# Patient Record
Sex: FEMALE | Race: WHITE | Employment: OTHER | URBAN - METROPOLITAN AREA
[De-identification: names, ages, dates, MRNs, and addresses within clinical notes are randomized per-mention and may not be internally consistent; named-entity substitution may affect disease eponyms.]

---

## 2017-01-16 ENCOUNTER — HOSPITAL ENCOUNTER (OUTPATIENT)
Dept: MAMMOGRAPHY | Age: 72
Discharge: HOME | End: 2017-01-16
Attending: SURGERY | Admitting: SURGERY

## 2017-01-16 ENCOUNTER — OFFICE VISIT (OUTPATIENT)
Dept: SURGERY | Age: 72
End: 2017-01-16

## 2017-01-16 VITALS
HEART RATE: 66 BPM | DIASTOLIC BLOOD PRESSURE: 83 MMHG | HEIGHT: 65 IN | SYSTOLIC BLOOD PRESSURE: 137 MMHG | TEMPERATURE: 97.1 F | WEIGHT: 169.2 LBS | BODY MASS INDEX: 28.19 KG/M2

## 2017-01-16 DIAGNOSIS — R92.8 ABNORMAL FINDING ON BREAST IMAGING: ICD-10-CM

## 2017-01-16 DIAGNOSIS — R92.8 ABNORMAL FINDING ON BREAST IMAGING: Primary | ICD-10-CM

## 2017-01-16 DIAGNOSIS — Z12.31 SCREENING MAMMOGRAM, ENCOUNTER FOR: ICD-10-CM

## 2017-01-16 PROCEDURE — 99213 OFFICE O/P EST LOW 20 MIN: CPT | Performed by: SURGERY

## 2017-01-16 PROCEDURE — 3014F SCREEN MAMMO DOC REV: CPT | Performed by: SURGERY

## 2017-01-16 PROCEDURE — 4004F PT TOBACCO SCREEN RCVD TLK: CPT | Performed by: SURGERY

## 2017-01-16 PROCEDURE — 1090F PRES/ABSN URINE INCON ASSESS: CPT | Performed by: SURGERY

## 2017-01-16 PROCEDURE — 3017F COLORECTAL CA SCREEN DOC REV: CPT | Performed by: SURGERY

## 2017-01-16 PROCEDURE — 1123F ACP DISCUSS/DSCN MKR DOCD: CPT | Performed by: SURGERY

## 2017-01-16 PROCEDURE — G8399 PT W/DXA RESULTS DOCUMENT: HCPCS | Performed by: SURGERY

## 2017-01-16 PROCEDURE — 4040F PNEUMOC VAC/ADMIN/RCVD: CPT | Performed by: SURGERY

## 2017-01-16 PROCEDURE — G8484 FLU IMMUNIZE NO ADMIN: HCPCS | Performed by: SURGERY

## 2017-01-16 PROCEDURE — G8420 CALC BMI NORM PARAMETERS: HCPCS | Performed by: SURGERY

## 2017-01-16 PROCEDURE — G8427 DOCREV CUR MEDS BY ELIG CLIN: HCPCS | Performed by: SURGERY

## 2017-01-16 ASSESSMENT — ENCOUNTER SYMPTOMS
BACK PAIN: 1
WHEEZING: 1
CONSTIPATION: 1
ABDOMINAL PAIN: 1
EYES NEGATIVE: 1
SHORTNESS OF BREATH: 1

## 2017-01-27 PROBLEM — F32.A DEPRESSION: Status: ACTIVE | Noted: 2017-01-27

## 2017-05-11 ENCOUNTER — HOSPITAL ENCOUNTER (OUTPATIENT)
Dept: ENDOSCOPY | Age: 72
Discharge: HOME | End: 2017-05-11
Attending: INTERNAL MEDICINE | Admitting: INTERNAL MEDICINE

## 2017-05-11 VITALS
HEART RATE: 57 BPM | OXYGEN SATURATION: 98 % | TEMPERATURE: 98.1 F | RESPIRATION RATE: 18 BRPM | SYSTOLIC BLOOD PRESSURE: 142 MMHG | DIASTOLIC BLOOD PRESSURE: 72 MMHG

## 2017-05-11 LAB
GLUCOSE BLD-MCNC: 86 MG/DL (ref 70–99)
PERFORMED ON: NORMAL

## 2017-05-11 RX ORDER — SODIUM CHLORIDE 0.9 % (FLUSH) 0.9 %
10 SYRINGE (ML) INJECTION EVERY 12 HOURS SCHEDULED
Status: DISCONTINUED | OUTPATIENT
Start: 2017-05-11 | End: 2017-05-12 | Stop reason: HOSPADM

## 2017-05-11 RX ORDER — SODIUM CHLORIDE 0.9 % (FLUSH) 0.9 %
10 SYRINGE (ML) INJECTION PRN
Status: DISCONTINUED | OUTPATIENT
Start: 2017-05-11 | End: 2017-05-12 | Stop reason: HOSPADM

## 2017-05-11 RX ORDER — SODIUM CHLORIDE 9 MG/ML
INJECTION, SOLUTION INTRAVENOUS CONTINUOUS
Status: DISCONTINUED | OUTPATIENT
Start: 2017-05-11 | End: 2017-05-12 | Stop reason: HOSPADM

## 2017-05-11 RX ORDER — LIDOCAINE HYDROCHLORIDE 10 MG/ML
1 INJECTION, SOLUTION EPIDURAL; INFILTRATION; INTRACAUDAL; PERINEURAL
Status: ACTIVE | OUTPATIENT
Start: 2017-05-11 | End: 2017-05-11

## 2017-05-11 ASSESSMENT — PAIN SCALES - GENERAL
PAINLEVEL_OUTOF10: 0

## 2017-05-11 ASSESSMENT — COPD QUESTIONNAIRES: CAT_SEVERITY: SEVERE

## 2017-09-22 PROBLEM — R87.810 PAP SMEAR OF CERVIX SHOWS HIGH RISK HPV PRESENT: Status: ACTIVE | Noted: 2017-09-22

## 2017-09-22 PROBLEM — A63.0 HPV (HUMAN PAPILLOMA VIRUS) ANOGENITAL INFECTION: Status: ACTIVE | Noted: 2017-09-22

## 2017-12-08 ENCOUNTER — OFFICE VISIT (OUTPATIENT)
Dept: PULMONOLOGY | Age: 72
End: 2017-12-08

## 2017-12-08 VITALS
SYSTOLIC BLOOD PRESSURE: 140 MMHG | BODY MASS INDEX: 28.99 KG/M2 | HEART RATE: 79 BPM | RESPIRATION RATE: 18 BRPM | WEIGHT: 174 LBS | OXYGEN SATURATION: 94 % | DIASTOLIC BLOOD PRESSURE: 79 MMHG | HEIGHT: 65 IN

## 2017-12-08 DIAGNOSIS — J43.2 CENTRILOBULAR EMPHYSEMA (HCC): ICD-10-CM

## 2017-12-08 DIAGNOSIS — R06.02 SOB (SHORTNESS OF BREATH): Primary | ICD-10-CM

## 2017-12-08 DIAGNOSIS — K44.9 HIATAL HERNIA: ICD-10-CM

## 2017-12-08 DIAGNOSIS — J44.9 COPD, SEVERITY TO BE DETERMINED (HCC): ICD-10-CM

## 2017-12-08 PROCEDURE — 99204 OFFICE O/P NEW MOD 45 MIN: CPT | Performed by: INTERNAL MEDICINE

## 2017-12-08 PROCEDURE — G8427 DOCREV CUR MEDS BY ELIG CLIN: HCPCS | Performed by: INTERNAL MEDICINE

## 2017-12-08 PROCEDURE — G8484 FLU IMMUNIZE NO ADMIN: HCPCS | Performed by: INTERNAL MEDICINE

## 2017-12-08 PROCEDURE — 1090F PRES/ABSN URINE INCON ASSESS: CPT | Performed by: INTERNAL MEDICINE

## 2017-12-08 PROCEDURE — 3014F SCREEN MAMMO DOC REV: CPT | Performed by: INTERNAL MEDICINE

## 2017-12-08 PROCEDURE — 3023F SPIROM DOC REV: CPT | Performed by: INTERNAL MEDICINE

## 2017-12-08 PROCEDURE — G8926 SPIRO NO PERF OR DOC: HCPCS | Performed by: INTERNAL MEDICINE

## 2017-12-08 PROCEDURE — 3017F COLORECTAL CA SCREEN DOC REV: CPT | Performed by: INTERNAL MEDICINE

## 2017-12-08 PROCEDURE — G8417 CALC BMI ABV UP PARAM F/U: HCPCS | Performed by: INTERNAL MEDICINE

## 2017-12-08 PROCEDURE — G8399 PT W/DXA RESULTS DOCUMENT: HCPCS | Performed by: INTERNAL MEDICINE

## 2017-12-08 PROCEDURE — 1123F ACP DISCUSS/DSCN MKR DOCD: CPT | Performed by: INTERNAL MEDICINE

## 2017-12-08 PROCEDURE — 4040F PNEUMOC VAC/ADMIN/RCVD: CPT | Performed by: INTERNAL MEDICINE

## 2017-12-08 PROCEDURE — 4004F PT TOBACCO SCREEN RCVD TLK: CPT | Performed by: INTERNAL MEDICINE

## 2017-12-08 RX ORDER — OMEPRAZOLE 40 MG/1
40 CAPSULE, DELAYED RELEASE ORAL DAILY
Qty: 30 CAPSULE | Refills: 3 | Status: SHIPPED | OUTPATIENT
Start: 2017-12-08 | End: 2018-03-15 | Stop reason: SDUPTHER

## 2017-12-08 NOTE — LETTER
Pulmonary, Critical Care & Sleep  [address] [facility], [address] [id number]  Phone: [phone]  Fax: [phone]        December 8, 2017       Patient: Jenae Lozano   MR Number: [id number]   YOB: 1945   Date of Visit: 12/8/2017       Dear Dr. Óscar Dewitt MD  :    Thank you for the request for consultation for Jenae Lozano to me for the evaluation of Shortness of breath and COPD. Below are the relevant portions of my assessment and plan of care. Assessment:       1. SOB (shortness of breath)     2. COPD, severity to be determined ([address]     3. Centrilobular emphysema ([address]     4. Hiatal hernia       Plan:    · I discussed with patient the above  · I reviewed her chart including her last CAT scan of the chest  · I explained findings of a large bulla on the right side and diffuse emphysema changes  · I think her hiatal hernia and GERD symptoms is contributing to her recurrent respiratory symptoms  · I encouraged her to stop smoking  · I will increase her Advair to 230 twice a day, start Spiriva daily and continue albuterol HFA when necessary  · Continue albuterol neb treatment twice a day and add a flutter valve for airway clearance  · Educated about hiatal hernia and GERD related respiratory symptoms and gave her instructions to follow, will start Prilosec 40 mg daily as well  · Order full PFT and a CT scan of the chest  · She might benefit from pulmonary rehab which we will order once PFT resulted  · Return to see in 1 month  ·   If you have questions, please do not hesitate to call me. I look forward to following Dodie Serrato along with you.     Sincerely,        Josi Palomares MD    CC providers:  Óscar Dewitt MD  [facility], [address]

## 2017-12-08 NOTE — PROGRESS NOTES
Mother      cervical    Stroke Sister     Heart Disease Brother     Breast Cancer Maternal Aunt      x2     Social History     Social History    Marital status:      Spouse name: N/A    Number of children: N/A    Years of education: N/A     Occupational History    Not on file. Social History Main Topics    Smoking status: Current Every Day Smoker     Packs/day: 0.50     Years: 57.00     Types: Cigarettes    Smokeless tobacco: Never Used      Comment: started to smoke at 16 / smoked up to 2 p.p.d / now smokes 10 to 15 cigarettes a day    Alcohol use No    Drug use: Yes      Comment: marijuana    Sexual activity: Not on file     Other Topics Concern    Not on file     Social History Narrative    No narrative on file         Review of Systems   Constitutional: Negative. Negative for fever, chills, diaphoresis, activity change, appetite change, fatigue and unexpected weight change. HENT: Negative. Negative for hearing loss, ear pain, nosebleeds, congestion, facial swelling, rhinorrhea, sneezing, neck pain, neck stiffness, postnasal drip, sinus pressure and ear discharge. Eyes: Negative. Negative for photophobia, pain, discharge, redness, itching and visual disturbance. Respiratory: As per HPI  Cardiovascular: Negative. Negative for chest pain, palpitations and leg swelling. Gastrointestinal: Negative. Negative for abdominal pain, blood in stool, abdominal distention and anal bleeding. Genitourinary: Negative. Negative for dysuria, urgency, frequency, hematuria, decreased urine volume, enuresis and difficulty urinating. Musculoskeletal: Negative. Negative for myalgias, back pain, joint swelling, arthralgias and gait problem. Skin: Negative. Negative for color change and pallor. Neurological: Negative. Negative for dizziness, tremors, seizures, syncope, facial asymmetry, speech difficulty, weakness, light-headedness, numbness and headaches. Hematological: Negative.

## 2017-12-12 ENCOUNTER — TELEPHONE (OUTPATIENT)
Dept: PULMONOLOGY | Age: 72
End: 2017-12-12

## 2017-12-19 ENCOUNTER — HOSPITAL ENCOUNTER (OUTPATIENT)
Dept: OTHER | Age: 72
Discharge: HOME | End: 2017-12-19
Attending: INTERNAL MEDICINE | Admitting: INTERNAL MEDICINE

## 2017-12-19 LAB
ALBUMIN SERPL-MCNC: 4.5 G/DL (ref 3.4–5)
ANION GAP SERPL CALCULATED.3IONS-SCNC: 15 MMOL/L (ref 3–16)
BUN BLDV-MCNC: 10 MG/DL (ref 7–20)
CALCIUM SERPL-MCNC: 9.1 MG/DL (ref 8.3–10.6)
CHLORIDE BLD-SCNC: 102 MMOL/L (ref 99–110)
CO2: 24 MMOL/L (ref 21–32)
CREAT SERPL-MCNC: 0.9 MG/DL (ref 0.6–1.2)
GFR AFRICAN AMERICAN: >60
GFR NON-AFRICAN AMERICAN: >60
GLUCOSE BLD-MCNC: 88 MG/DL (ref 70–99)
PHOSPHORUS: 2.6 MG/DL (ref 2.5–4.9)
POTASSIUM SERPL-SCNC: 4.5 MMOL/L (ref 3.5–5.1)
SODIUM BLD-SCNC: 141 MMOL/L (ref 136–145)

## 2017-12-21 ENCOUNTER — TELEPHONE (OUTPATIENT)
Dept: PULMONOLOGY | Age: 72
End: 2017-12-21

## 2017-12-21 NOTE — TELEPHONE ENCOUNTER
Patient went to schedule her ct and it was without contrast. Patient was under the impression the ct was supposed to have contrast,and that is why she was having bloodwork.  Please call Lucy back with scheduling and let her know if the ct should be with or without contrast.

## 2017-12-29 ENCOUNTER — OFFICE VISIT (OUTPATIENT)
Dept: PULMONOLOGY | Age: 72
End: 2017-12-29

## 2017-12-29 VITALS
DIASTOLIC BLOOD PRESSURE: 76 MMHG | HEART RATE: 75 BPM | HEIGHT: 65 IN | WEIGHT: 170 LBS | OXYGEN SATURATION: 87 % | BODY MASS INDEX: 28.32 KG/M2 | RESPIRATION RATE: 18 BRPM | SYSTOLIC BLOOD PRESSURE: 136 MMHG | TEMPERATURE: 97.7 F

## 2017-12-29 DIAGNOSIS — J20.9 ACUTE BRONCHITIS, UNSPECIFIED ORGANISM: ICD-10-CM

## 2017-12-29 DIAGNOSIS — J96.11 CHRONIC RESPIRATORY FAILURE WITH HYPOXIA (HCC): ICD-10-CM

## 2017-12-29 DIAGNOSIS — J44.1 ACUTE EXACERBATION OF CHRONIC OBSTRUCTIVE PULMONARY DISEASE (HCC): Primary | ICD-10-CM

## 2017-12-29 PROCEDURE — G8427 DOCREV CUR MEDS BY ELIG CLIN: HCPCS | Performed by: INTERNAL MEDICINE

## 2017-12-29 PROCEDURE — G8399 PT W/DXA RESULTS DOCUMENT: HCPCS | Performed by: INTERNAL MEDICINE

## 2017-12-29 PROCEDURE — 1123F ACP DISCUSS/DSCN MKR DOCD: CPT | Performed by: INTERNAL MEDICINE

## 2017-12-29 PROCEDURE — G8417 CALC BMI ABV UP PARAM F/U: HCPCS | Performed by: INTERNAL MEDICINE

## 2017-12-29 PROCEDURE — 99214 OFFICE O/P EST MOD 30 MIN: CPT | Performed by: INTERNAL MEDICINE

## 2017-12-29 PROCEDURE — 3023F SPIROM DOC REV: CPT | Performed by: INTERNAL MEDICINE

## 2017-12-29 PROCEDURE — G8484 FLU IMMUNIZE NO ADMIN: HCPCS | Performed by: INTERNAL MEDICINE

## 2017-12-29 PROCEDURE — 3014F SCREEN MAMMO DOC REV: CPT | Performed by: INTERNAL MEDICINE

## 2017-12-29 PROCEDURE — 3017F COLORECTAL CA SCREEN DOC REV: CPT | Performed by: INTERNAL MEDICINE

## 2017-12-29 PROCEDURE — G8926 SPIRO NO PERF OR DOC: HCPCS | Performed by: INTERNAL MEDICINE

## 2017-12-29 PROCEDURE — 4004F PT TOBACCO SCREEN RCVD TLK: CPT | Performed by: INTERNAL MEDICINE

## 2017-12-29 PROCEDURE — 1090F PRES/ABSN URINE INCON ASSESS: CPT | Performed by: INTERNAL MEDICINE

## 2017-12-29 PROCEDURE — 4040F PNEUMOC VAC/ADMIN/RCVD: CPT | Performed by: INTERNAL MEDICINE

## 2017-12-29 RX ORDER — PREDNISONE 10 MG/1
TABLET ORAL
Qty: 30 TABLET | Refills: 0 | Status: ON HOLD | OUTPATIENT
Start: 2017-12-29 | End: 2018-02-05 | Stop reason: HOSPADM

## 2017-12-29 RX ORDER — LEVOFLOXACIN 500 MG/1
500 TABLET, FILM COATED ORAL DAILY
Qty: 10 TABLET | Refills: 0 | Status: SHIPPED | OUTPATIENT
Start: 2017-12-29 | End: 2018-01-08

## 2017-12-29 NOTE — LETTER
Pulmonary, Critical Care & Sleep  [address] [facility], [address] [address]  Phone: [phone]  Fax: [phone]      December 29, 2017       Patient: Ramin Osorio   MR Number: [id number]   YOB: 1945   Date of Visit: 12/29/2017       Dear Dr. Tamie Cerna MD      I saw Mrs. Ramin Osorio today for acute visit. Below are the relevant portions of my assessment and plan of care. Assessment:    1. SOB (shortness of breath)     2. COPD, severity to be determined ([facility]     3. Centrilobular emphysema ([facility] [address]     4. Hiatal hernia       Plan:    · She probably had influenza infection few days ago but she is beyond the window for benefit of treatment  · I will give her a course of Levaquin and prednisone taper  · I asked her to go ahead and get her CAT scan and primary function test in the next few weeks as planned  · I will arrange for portable oxygen  · I encouraged her to stop smoking  · Continue Advair 230 twice a day, Spiriva daily and albuterol HFA when necessary  · Continue albuterol neb treatment twice a day and add a flutter valve for airway clearance        · Educated about hiatal hernia and GERD related respiratory symptoms and gave her instructions to follow, will start Prilosec 40 mg daily as well  · Return to see as scheduled previously     If you have questions, please do not hesitate to call me. I look forward to following Hilary Mcdonald along with you.     Sincerely,        Enrique Walton MD    CC providers:  Tamie Cerna MD  [facility], [address]

## 2017-12-29 NOTE — PROGRESS NOTES
(AROMASIN) 25 MG chemo tablet TAKE 1 TABLET BY MOUTH ONCE A DAY FOR BREAST CANCER 90 tablet 0    clopidogrel (PLAVIX) 75 MG tablet Take 75 mg by mouth daily      OXYGEN Inhale into the lungs At night prn      FLUoxetine (PROZAC) 20 MG capsule Take 3 capsules by mouth daily 90 capsule 3    HYDROcodone-acetaminophen (NORCO)  MG per tablet Take 1 tablet by mouth every 6 hours as needed for Pain. 15 tablet 0    bisacodyl (DULCOLAX) 5 MG EC tablet Take 5 mg by mouth nightly as needed for Constipation.  levothyroxine (SYNTHROID) 50 MCG tablet Take 50 mcg by mouth Daily.  albuterol (PROVENTIL HFA;VENTOLIN HFA) 108 (90 BASE) MCG/ACT inhaler Inhale 2 puffs into the lungs every 6 hours as needed for Wheezing.  guaifenesin (MUCINEX) 600 MG SR tablet Take 800 mg by mouth 3 times daily.  fluticasone-salmeterol (ADVAIR HFA) 115-21 MCG/ACT inhaler Inhale 2 puffs into the lungs daily.  potassium chloride SA (K-DUR;KLOR-CON) 20 MEQ tablet Take 20 mEq by mouth 2 times daily.  cilostazol (PLETAL) 100 MG tablet Take 100 mg by mouth 2 times daily.  furosemide (LASIX) 40 MG tablet Take 40 mg by mouth daily.  pregabalin (LYRICA) 150 MG capsule Take 150 mg by mouth 2 times daily.  rosuvastatin (CRESTOR) 10 MG tablet Take 20 mg by mouth daily.  lisinopril (PRINIVIL;ZESTRIL) 10 MG tablet Take 5 mg by mouth daily.  lorazepam (ATIVAN) 1 MG tablet Take 3 mg by mouth every evening.  fluticasone (FLONASE) 50 MCG/ACT nasal spray 2 sprays by Nasal route daily. No current facility-administered medications for this visit. Family History   Problem Relation Age of Onset    Cancer Mother      cervical    Stroke Sister     Heart Disease Brother     Breast Cancer Maternal Aunt      x2     Social History     Social History    Marital status:      Spouse name: N/A    Number of children: N/A    Years of education: N/A     Occupational History    Not on file. (78.9 kg), last menstrual period 03/11/1991, SpO2 94 %, not currently breastfeeding. Physical Exam   Constitutional: Oriented. Well-developed and well-nourished. No distress. HENT:   Head: Normocephalic and atraumatic. Mouth/Throat: Oropharynx is clear and moist. No oropharyngeal exudate. Eyes: Conjunctivae and extraocular motions are normal. Pupils are equal, round, and reactive to light. Right eye exhibits no discharge. Left eye exhibits no discharge. Neck: Normal range of motion. Neck supple. No JVD present. Carotid bruit is not present. No rigidity. No tracheal deviation present. No thyromegaly present. Cardiovascular: Normal rate, regular rhythm, S1&S2 and intact distal pulses. Pulmonary/Chest: Diminished breath sounds. Has no wheezes. Has no rhonchi. Has no rales. Exhibits no tenderness. Abdominal: Soft. Bowel sounds are normal. Exhibits no shifting dullness, no distension and no mass. No tenderness. Has no rebound and no guarding. Musculoskeletal: Normal range of motion. Exhibits no edema and no tenderness. Lymphadenopathy:     Has no cervical adenopathy. Has no axillary adenopathy. Neurological: Alert and oriented. Has normal reflexes. No cranial nerve deficit. Exhibits normal muscle tone. Coordination normal.   Skin: Skin is warm and dry. No rash noted. No erythema. Psychiatric: Has a normal mood and affect. Behavior is normal. Thought content normal.      Assessment:    1. SOB (shortness of breath)     2. COPD, severity to be determined ([address]     3. Centrilobular emphysema ([address]     4.  Hiatal hernia               Plan:    · She probably had influenza infection few days ago but she is beyond the window for benefit of treatment  · I will give her a course of Levaquin and prednisone taper  · I asked her to go ahead and get her CAT scan and primary function test in the next few weeks as planned  · I will arrange for portable oxygen  · I encouraged her to stop smoking  · Continue Advair 230 twice a day, Spiriva daily and albuterol HFA when necessary  · Continue albuterol neb treatment twice a day and add a flutter valve for airway clearance  · Educated about hiatal hernia and GERD related respiratory symptoms and gave her instructions to follow, will start Prilosec 40 mg daily as well  · Return to see as scheduled previously

## 2018-01-05 ENCOUNTER — TELEPHONE (OUTPATIENT)
Dept: PULMONOLOGY | Age: 73
End: 2018-01-05

## 2018-01-05 ENCOUNTER — HOSPITAL ENCOUNTER (OUTPATIENT)
Dept: PULMONOLOGY | Age: 73
Discharge: HOME | End: 2018-01-05
Attending: INTERNAL MEDICINE | Admitting: INTERNAL MEDICINE

## 2018-01-05 DIAGNOSIS — J43.2 CENTRILOBULAR EMPHYSEMA (HCC): ICD-10-CM

## 2018-01-05 DIAGNOSIS — R06.02 SOB (SHORTNESS OF BREATH): ICD-10-CM

## 2018-01-05 DIAGNOSIS — J44.9 CHRONIC OBSTRUCTIVE PULMONARY DISEASE (HCC): ICD-10-CM

## 2018-01-11 ENCOUNTER — OFFICE VISIT (OUTPATIENT)
Dept: PULMONOLOGY | Age: 73
End: 2018-01-11

## 2018-01-11 VITALS
SYSTOLIC BLOOD PRESSURE: 124 MMHG | HEART RATE: 81 BPM | WEIGHT: 164 LBS | RESPIRATION RATE: 18 BRPM | OXYGEN SATURATION: 90 % | HEIGHT: 65 IN | DIASTOLIC BLOOD PRESSURE: 68 MMHG | BODY MASS INDEX: 27.32 KG/M2

## 2018-01-11 DIAGNOSIS — J96.11 CHRONIC RESPIRATORY FAILURE WITH HYPOXIA (HCC): ICD-10-CM

## 2018-01-11 DIAGNOSIS — T17.500A MUCUS PLUGGING OF BRONCHI: ICD-10-CM

## 2018-01-11 DIAGNOSIS — R93.89 ABNORMAL CT OF THE CHEST: ICD-10-CM

## 2018-01-11 DIAGNOSIS — J44.9 COPD, SEVERE (HCC): Primary | ICD-10-CM

## 2018-01-11 DIAGNOSIS — R91.8 PULMONARY NODULES: ICD-10-CM

## 2018-01-11 PROCEDURE — 3014F SCREEN MAMMO DOC REV: CPT | Performed by: INTERNAL MEDICINE

## 2018-01-11 PROCEDURE — G8484 FLU IMMUNIZE NO ADMIN: HCPCS | Performed by: INTERNAL MEDICINE

## 2018-01-11 PROCEDURE — G8399 PT W/DXA RESULTS DOCUMENT: HCPCS | Performed by: INTERNAL MEDICINE

## 2018-01-11 PROCEDURE — G8417 CALC BMI ABV UP PARAM F/U: HCPCS | Performed by: INTERNAL MEDICINE

## 2018-01-11 PROCEDURE — 4004F PT TOBACCO SCREEN RCVD TLK: CPT | Performed by: INTERNAL MEDICINE

## 2018-01-11 PROCEDURE — 4040F PNEUMOC VAC/ADMIN/RCVD: CPT | Performed by: INTERNAL MEDICINE

## 2018-01-11 PROCEDURE — 1123F ACP DISCUSS/DSCN MKR DOCD: CPT | Performed by: INTERNAL MEDICINE

## 2018-01-11 PROCEDURE — G8926 SPIRO NO PERF OR DOC: HCPCS | Performed by: INTERNAL MEDICINE

## 2018-01-11 PROCEDURE — 1090F PRES/ABSN URINE INCON ASSESS: CPT | Performed by: INTERNAL MEDICINE

## 2018-01-11 PROCEDURE — 3023F SPIROM DOC REV: CPT | Performed by: INTERNAL MEDICINE

## 2018-01-11 PROCEDURE — 99214 OFFICE O/P EST MOD 30 MIN: CPT | Performed by: INTERNAL MEDICINE

## 2018-01-11 PROCEDURE — G8427 DOCREV CUR MEDS BY ELIG CLIN: HCPCS | Performed by: INTERNAL MEDICINE

## 2018-01-11 PROCEDURE — 3017F COLORECTAL CA SCREEN DOC REV: CPT | Performed by: INTERNAL MEDICINE

## 2018-01-11 NOTE — PROGRESS NOTES
Chief Complaint     Chief Complaint   Patient presents with    COPD     1 mo follow up - pt had her CT Chest and is here for the results     Patient is here for Follow-up visit  She felt better after antibiotic course and prednisone taper course  She continues to have some shortness of breath and dyspnea on exertion    She does have oxygen at home and use that as needed   O2 saturation on room air today was 91% at rest  He continues to have chronic productive cough and difficulty clearing secretions  She denies recurrent fever, chills, diaphoresis and denies any hemoptysis  Gabby Zimmerman is 71 y.o. female here for Pulmonary Medicine consultation referred by Dr. Mili Spencer MD  She is on Advair 2:30 twice a day and Incruse  She also uses nebulizer treatment 3-4 times per day  Last CT scan of the chest was in 2015 and showed large bulla on the right side and diffuse emphysema changes  He does report GERD symptoms  She used to see Dr. Jonathan Pappas for her vascular disease  She has been a smoker for 54 years and continues to smoke  Repeat CT scan of the chest was read as  EXAMINATION:   CT OF THE CHEST WITHOUT CONTRAST 1/5/2018 12:43 pm       TECHNIQUE:   CT of the chest was performed without the administration of intravenous   contrast. Multiplanar reformatted images are provided for review.  Dose   modulation, iterative reconstruction, and/or weight based adjustment of the   mA/kV was utilized to reduce the radiation dose to as low as reasonably   achievable.       COMPARISON:   Chest CT scan 08/20/2015.       HISTORY:   ORDERING PHYSICIAN PROVIDED HISTORY: SOB (shortness of breath)   TECHNOLOGIST PROVIDED HISTORY:   Technologist Provided Reason for Exam: sob   Acuity: Unknown   Type of Encounter: Unknown       FINDINGS:   Mediastinum: Limited evaluation without IV contrast.  Coronary artery   calcifications are noted.  No significant pericardial effusion.  No obvious   adenopathy detected.  Stent is present within proximal is clear and moist. No oropharyngeal exudate. Eyes: Conjunctivae and extraocular motions are normal. Pupils are equal, round, and reactive to light. Right eye exhibits no discharge. Left eye exhibits no discharge. Neck: Normal range of motion. Neck supple. No JVD present. Carotid bruit is not present. No rigidity. No tracheal deviation present. No thyromegaly present. Cardiovascular: Normal rate, regular rhythm, S1&S2 and intact distal pulses. Pulmonary/Chest: Diminished breath sounds. Has no wheezes. Has no rhonchi. Has no rales. Exhibits no tenderness. Abdominal: Soft. Bowel sounds are normal. Exhibits no shifting dullness, no distension and no mass. No tenderness. Has no rebound and no guarding. Musculoskeletal: Normal range of motion. Exhibits no edema and no tenderness. Lymphadenopathy:     Has no cervical adenopathy. Has no axillary adenopathy. Neurological: Alert and oriented. Has normal reflexes. No cranial nerve deficit. Exhibits normal muscle tone. Coordination normal.   Skin: Skin is warm and dry. No rash noted. No erythema. Psychiatric: Has a normal mood and affect. Behavior is normal. Thought content normal.      Assessment:    1. COPD, severe ([address]     2. Mucus plugging of bronchi     3. Pulmonary nodules     4. Abnormal CT of the chest     5.  Chronic respiratory failure with hypoxia (HCC)                 Plan:    · She probably had influenza infection with complicated pneumonia  · She felt better after her antibiotic course and prednisone taper  · I reviewed her CAT scan of the chest and explained findings, most of those changes might be due to the recent pneumonia although she does have some chronic mucus plugging and some nodular changes that needs follow-up  · I recommended bronchoscopy which we will schedule and a repeat CT scan of the chest in 3 months  · I encouraged her to stop smoking  · Continue Advair 230 twice a day, Incruse daily and albuterol HFA when

## 2018-01-11 NOTE — COMMUNICATION BODY
Assessment:    1. COPD, severe ([address]     2. Mucus plugging of bronchi     3. Pulmonary nodules     4. Abnormal CT of the chest     5.  Chronic respiratory failure with hypoxia (HCC)                 Plan:    · She probably had influenza infection with complicated pneumonia  · She felt better after her antibiotic course and prednisone taper  · I reviewed her CAT scan of the chest and explained findings, most of those changes might be due to the recent pneumonia although she does have some chronic mucus plugging and some nodular changes that needs follow-up  · I recommended bronchoscopy which we will schedule and a repeat CT scan of the chest in 3 months  · I encouraged her to stop smoking  · Continue Advair 230 twice a day, Incruse daily and albuterol HFA when necessary  · Continue albuterol neb treatment twice a day and add a flutter valve for airway clearance  · Educated again about hiatal hernia and GERD related respiratory symptoms, continue Prilosec 40 mg daily and follow instructions  · Return to see in 1 month

## 2018-01-17 ENCOUNTER — TELEPHONE (OUTPATIENT)
Dept: PULMONOLOGY | Age: 73
End: 2018-01-17

## 2018-01-22 NOTE — ANESTHESIA PRE-OP
800 mg by mouth 3 times daily. Historical Provider, MD   potassium chloride SA (K-DUR;KLOR-CON) 20 MEQ tablet Take 20 mEq by mouth 2 times daily. Historical Provider, MD   cilostazol (PLETAL) 100 MG tablet Take 100 mg by mouth 2 times daily. Historical Provider, MD   furosemide (LASIX) 40 MG tablet Take 40 mg by mouth daily. 3/8/10   Historical Provider, MD   pregabalin (LYRICA) 150 MG capsule Take 150 mg by mouth 2 times daily. Historical Provider, MD   rosuvastatin (CRESTOR) 10 MG tablet Take 20 mg by mouth daily. Historical Provider, MD   lisinopril (PRINIVIL;ZESTRIL) 10 MG tablet Take 5 mg by mouth daily. Historical Provider, MD   lorazepam (ATIVAN) 1 MG tablet Take 3 mg by mouth every evening. 3/8/10   Historical Provider, MD   fluticasone (FLONASE) 50 MCG/ACT nasal spray 2 sprays by Nasal route daily.     Historical Provider, MD       Current medications:    Current Outpatient Prescriptions   Medication Sig Dispense Refill    predniSONE (DELTASONE) 10 MG tablet Take 40 mg daily x 3 days, 30 mg daily x 3 days, 20 mg daily x 3 days, 10 mg daily x 3 days then stop 30 tablet 0    Umeclidinium Bromide (INCRUSE ELLIPTA) 62.5 MCG/INH AEPB Inhale 1 puff into the lungs daily      fluticasone-salmeterol (ADVAIR HFA) 230-21 MCG/ACT inhaler Inhale 2 puffs into the lungs 2 times daily 1 Inhaler 2    tiotropium (SPIRIVA RESPIMAT) 2.5 MCG/ACT AERS inhaler Inhale 2 puffs into the lungs daily 1 Inhaler 2    omeprazole (PRILOSEC) 40 MG delayed release capsule Take 1 capsule by mouth daily 30 capsule 3    exemestane (AROMASIN) 25 MG chemo tablet TAKE 1 TABLET BY MOUTH ONCE A DAY FOR BREAST CANCER 90 tablet 0    clopidogrel (PLAVIX) 75 MG tablet Take 75 mg by mouth daily      OXYGEN Inhale into the lungs At night prn      FLUoxetine (PROZAC) 20 MG capsule Take 3 capsules by mouth daily 90 capsule 3    HYDROcodone-acetaminophen (NORCO)  MG per tablet Take 1 tablet by mouth every 6 hours as needed

## 2018-01-23 ENCOUNTER — TELEPHONE (OUTPATIENT)
Dept: PULMONOLOGY | Age: 73
End: 2018-01-23

## 2018-01-25 ENCOUNTER — HOSPITAL ENCOUNTER (OUTPATIENT)
Dept: ENDOSCOPY | Age: 73
Discharge: HOME | End: 2018-01-25
Attending: INTERNAL MEDICINE | Admitting: INTERNAL MEDICINE

## 2018-01-25 VITALS
RESPIRATION RATE: 17 BRPM | TEMPERATURE: 97 F | OXYGEN SATURATION: 94 % | SYSTOLIC BLOOD PRESSURE: 147 MMHG | DIASTOLIC BLOOD PRESSURE: 70 MMHG | HEART RATE: 59 BPM

## 2018-01-25 LAB
ANION GAP SERPL CALCULATED.3IONS-SCNC: 14 MMOL/L (ref 3–16)
APPEARANCE BAL (LAVAGE): ABNORMAL
APTT: 28.7 SEC (ref 24.1–34.9)
BUN BLDV-MCNC: 8 MG/DL (ref 7–20)
CALCIUM SERPL-MCNC: 8.6 MG/DL (ref 8.3–10.6)
CHLORIDE BLD-SCNC: 107 MMOL/L (ref 99–110)
CLOT EVALUATION BAL: ABNORMAL
CO2: 23 MMOL/L (ref 21–32)
COLOR LAVAGE: COLORLESS
COMMENT 4: ABNORMAL
CREAT SERPL-MCNC: 1 MG/DL (ref 0.6–1.2)
EOSIN: 6 %
GFR AFRICAN AMERICAN: >60
GFR NON-AFRICAN AMERICAN: 55
GLUCOSE BLD-MCNC: 102 MG/DL (ref 70–99)
HCT VFR BLD CALC: 39.8 % (ref 36–48)
HEMOGLOBIN: 13.4 G/DL (ref 12–16)
INR BLD: 1.06 (ref 0.85–1.15)
LYMPHOCYTES, BAL: 8 % (ref 5–10)
MACROPHAGES, BAL: 74 % (ref 90–95)
MCH RBC QN AUTO: 31.2 PG (ref 26–34)
MCHC RBC AUTO-ENTMCNC: 33.6 G/DL (ref 31–36)
MCV RBC AUTO: 92.7 FL (ref 80–100)
MONOCYTES, BAL: 2 %
NUMBER OF CELLS COUNTED BAL (LAVAGE): 200
PDW BLD-RTO: 13.5 % (ref 12.4–15.4)
PLATELET # BLD: 215 K/UL (ref 135–450)
PMV BLD AUTO: 10.3 FL (ref 5–10.5)
POTASSIUM SERPL-SCNC: 4.1 MMOL/L (ref 3.5–5.1)
PROTHROMBIN TIME: 12 SEC (ref 9.6–13)
RBC # BLD: 4.3 M/UL (ref 4–5.2)
RBC, BAL: <1000 /CUMM
SEGMENTED NEUTROPHILS, BAL: 10 % (ref 5–10)
SODIUM BLD-SCNC: 144 MMOL/L (ref 136–145)
WBC # BLD: 9 K/UL (ref 4–11)
WBC/EPI CELLS BAL: 347 /CUMM

## 2018-01-25 PROCEDURE — 31624 DX BRONCHOSCOPE/LAVAGE: CPT | Performed by: INTERNAL MEDICINE

## 2018-01-25 RX ORDER — LIDOCAINE HYDROCHLORIDE 10 MG/ML
1 INJECTION, SOLUTION EPIDURAL; INFILTRATION; INTRACAUDAL; PERINEURAL
Status: ACTIVE | OUTPATIENT
Start: 2018-01-25 | End: 2018-01-25

## 2018-01-25 RX ORDER — SODIUM CHLORIDE 0.9 % (FLUSH) 0.9 %
10 SYRINGE (ML) INJECTION EVERY 12 HOURS SCHEDULED
Status: DISCONTINUED | OUTPATIENT
Start: 2018-01-25 | End: 2018-01-26 | Stop reason: HOSPADM

## 2018-01-25 RX ORDER — SODIUM CHLORIDE 9 MG/ML
INJECTION, SOLUTION INTRAVENOUS CONTINUOUS
Status: DISCONTINUED | OUTPATIENT
Start: 2018-01-25 | End: 2018-01-26 | Stop reason: HOSPADM

## 2018-01-25 RX ORDER — SODIUM CHLORIDE 0.9 % (FLUSH) 0.9 %
10 SYRINGE (ML) INJECTION PRN
Status: DISCONTINUED | OUTPATIENT
Start: 2018-01-25 | End: 2018-01-26 | Stop reason: HOSPADM

## 2018-01-25 ASSESSMENT — PAIN - FUNCTIONAL ASSESSMENT: PAIN_FUNCTIONAL_ASSESSMENT: 0-10

## 2018-01-25 ASSESSMENT — COPD QUESTIONNAIRES: CAT_SEVERITY: SEVERE

## 2018-01-25 ASSESSMENT — PAIN SCALES - GENERAL
PAINLEVEL_OUTOF10: 0

## 2018-01-25 ASSESSMENT — LIFESTYLE VARIABLES: SMOKING_STATUS: 1

## 2018-01-25 NOTE — OP NOTE
main bronchus: Normal mucosa  Right upper lobe bronchus: Normal mucosa  Right Middle lobe bronchus: Normal mucosa  Right Lower lobe bronchus: Normal mucosa  Left main bronchus: Normal mucosa  Left upper lobe bronchus: Normal mucosa  Left lower lobe bronchus: Normal mucosa    The Patient was taken to the Endoscopy Recovery area in satisfactory condition. Recommendation:    1. F/U on culturs results  2. F/U on cytology results    Attestation: I performed the procedure.     Francisca Davila

## 2018-01-25 NOTE — ANESTHESIA PRE-OP
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 Miki Short Buerger's disease ([facility]     Cancer of vulva ([facility] 2008    microscopic invasive squamous carcinoma vulva    Cataract     COPD (chronic obstructive pulmonary disease) ([facility]     Emphysema (subcutaneous) (surgical) resulting from a procedure     History of radiation therapy      Completed external beam radiation therapy 04/23/14 total 5000 cGy to the right breast.     Hypertension     Lung bullae ([facility] [address] [facility]     monitored by Dr. Beebe [place] vascular dis        Past Surgical History:        Procedure Laterality Date    BREAST LUMPECTOMY Right 2/6/14    BREAST SURGERY      tumors removed    EYE SURGERY      lasik    JOINT REPLACEMENT Bilateral     right and left KNEE    KNEE ARTHROSCOPY      KNEE SURGERY  9/1/10    REMOVAL OF RETAINED ANTIBIOTIC SPACERS,LEFT KNEE DEBRIDEMENT ANSD SYNOVECTOMY WITH FROZEN SECTION.  LEFT KNEE PLACEMENT OF ANTIBIOTIC SPACER LEFT KNEE    OTHER SURGICAL HISTORY  3-2010    subclavian stent left    OTHER SURGICAL HISTORY      stents bilateral femoral arteries    OTHER SURGICAL HISTORY Left 2/16/15    excision of left vulva lesion    VASCULAR SURGERY stent each groin    VULVECTOMY      History of radical vulvectomy with a left inguinal lymph node dissection November 2008. Social History:    Social History   Substance Use Topics    Smoking status: Current Every Day Smoker     Packs/day: 0.50     Years: 57.00     Types: Cigarettes    Smokeless tobacco: Never Used      Comment: started to smoke at 16 / smoked up to 2 p.p.d / now smokes 10 to 15 cigarettes a day    Alcohol use No                                Ready to quit: Not Answered  Counseling given: Not Answered      Vital Signs (Current):   Vitals:    01/25/18 0628   BP: (!) 165/79   Pulse: 64   Resp: 17   Temp: 98 °F (36.7 °C)   TempSrc: Tympanic   SpO2: 97%                                              BP Readings from Last 3 Encounters:   01/25/18 (!) 165/79   01/11/18 124/68   12/29/17 136/76       NPO Status: Time of last liquid consumption: 0545                        Time of last solid consumption: 1700                        Date of last liquid consumption: 01/25/18                        Date of last solid food consumption: 01/24/18    BMI:   Wt Readings from Last 3 Encounters:   01/19/18 164 lb (74.4 kg)   01/11/18 164 lb (74.4 kg)   12/29/17 170 lb (77.1 kg)     There is no height or weight on file to calculate BMI.     Anesthesia Evaluation  Patient summary reviewed and Nursing notes reviewed no history of anesthetic complications:   Airway: Mallampati: II  TM distance: >3 FB   Neck ROM: full  Mouth opening: > = 3 FB Dental:    (+) partials and upper dentures      Pulmonary:   (+) COPD (O2 prn, daily inhaler use, exacerbation txmt w/ abx and pred at beginning of month, Rt side bullae): severe,  current smoker    (-) sleep apnea                          ROS comment: Perforated nasal septum  PE comment: Coarse, distant Cardiovascular:  Exercise tolerance: poor (<4 METS),   (+) hypertension:, hyperlipidemia    (-) past MI, CAD (neg stress test 2013), CABG/stent, dysrhythmias,

## 2018-01-25 NOTE — PROGRESS NOTES
Iv d/c, pressure applied and no bleeding at site. Pt given PO fluids, no trouble swallowing and no signs of aspiration. Pt tolerated well. Pt assisted with dressing per daughter and taken to car via wheel chair. Pt in stable condition at discharge, VSS. Pt lungs diminished, rarely coughing at discharge, no reports shortness of breath or pain.

## 2018-01-27 LAB
CULTURE, RESPIRATORY: NORMAL
GRAM STAIN RESULT: NORMAL

## 2018-01-28 LAB
CULTURE, RESPIRATORY: NORMAL
GRAM STAIN RESULT: NORMAL

## 2018-01-29 ENCOUNTER — TELEPHONE (OUTPATIENT)
Dept: PULMONOLOGY | Age: 73
End: 2018-01-29

## 2018-01-30 ENCOUNTER — HOSPITAL ENCOUNTER (OUTPATIENT)
Dept: PULMONOLOGY | Age: 73
Discharge: HOME | End: 2018-01-30
Attending: SURGERY | Admitting: SURGERY

## 2018-01-30 VITALS — RESPIRATION RATE: 18 BRPM | HEART RATE: 73 BPM | OXYGEN SATURATION: 97 %

## 2018-01-30 DIAGNOSIS — J44.9 CHRONIC OBSTRUCTIVE PULMONARY DISEASE (HCC): ICD-10-CM

## 2018-01-30 RX ORDER — ALBUTEROL SULFATE 90 UG/1
4 AEROSOL, METERED RESPIRATORY (INHALATION) ONCE
Status: COMPLETED | OUTPATIENT
Start: 2018-01-30 | End: 2018-01-30

## 2018-01-30 RX ADMIN — ALBUTEROL SULFATE 4 PUFF: 90 AEROSOL, METERED RESPIRATORY (INHALATION) at 18:16

## 2018-02-02 ENCOUNTER — TELEPHONE (OUTPATIENT)
Dept: PULMONOLOGY | Age: 73
End: 2018-02-02

## 2018-02-02 DIAGNOSIS — R05.9 COUGH: Primary | ICD-10-CM

## 2018-02-02 NOTE — TELEPHONE ENCOUNTER
Patient called stated that ever since she did her pft her right lung has been hurting a lot.  She stated it took everything out of her and doesn't feel the same since and wanted to know if this is normal?

## 2018-02-03 ENCOUNTER — HOSPITAL ENCOUNTER (OUTPATIENT)
Dept: OTHER | Age: 73
Discharge: HOME | End: 2018-02-03
Attending: INTERNAL MEDICINE | Admitting: INTERNAL MEDICINE

## 2018-02-03 DIAGNOSIS — R05.9 COUGH: ICD-10-CM

## 2018-02-03 PROBLEM — J96.00 ACUTE RESPIRATORY FAILURE (HCC): Status: ACTIVE | Noted: 2018-02-03

## 2018-02-03 PROBLEM — J96.90 RESPIRATORY FAILURE (HCC): Status: ACTIVE | Noted: 2018-02-03

## 2018-02-05 ENCOUNTER — TELEPHONE (OUTPATIENT)
Dept: SURGERY | Age: 73
End: 2018-02-05

## 2018-02-05 ENCOUNTER — HOSPITAL ENCOUNTER (OUTPATIENT)
Dept: OTHER | Age: 73
Discharge: HOME | End: 2018-02-05
Attending: SURGERY | Admitting: SURGERY

## 2018-02-05 NOTE — TELEPHONE ENCOUNTER
Hospitalized (pt has pneumonia does not want to resched today will call back) patient's daughter said they would call back to reschedule.  (pt has been in hospital since Lori.)

## 2018-02-14 ENCOUNTER — HOSPITAL ENCOUNTER (OUTPATIENT)
Dept: ULTRASOUND IMAGING | Age: 73
Discharge: HOME | End: 2018-02-14
Attending: INTERNAL MEDICINE | Admitting: INTERNAL MEDICINE

## 2018-02-14 DIAGNOSIS — R06.02 SHORTNESS OF BREATH: ICD-10-CM

## 2018-02-14 DIAGNOSIS — R10.11 ABDOMINAL PAIN, RIGHT UPPER QUADRANT: ICD-10-CM

## 2018-02-14 DIAGNOSIS — R52 GENERALIZED PAIN: ICD-10-CM

## 2018-02-14 DIAGNOSIS — R10.11 RIGHT UPPER QUADRANT PAIN: ICD-10-CM

## 2018-02-15 ENCOUNTER — OFFICE VISIT (OUTPATIENT)
Dept: PULMONOLOGY | Age: 73
End: 2018-02-15

## 2018-02-15 VITALS
HEIGHT: 65 IN | DIASTOLIC BLOOD PRESSURE: 77 MMHG | RESPIRATION RATE: 18 BRPM | SYSTOLIC BLOOD PRESSURE: 126 MMHG | HEART RATE: 85 BPM | BODY MASS INDEX: 27.99 KG/M2 | WEIGHT: 168 LBS | TEMPERATURE: 97.7 F | OXYGEN SATURATION: 95 %

## 2018-02-15 DIAGNOSIS — J44.9 COPD, MILD (HCC): ICD-10-CM

## 2018-02-15 DIAGNOSIS — R93.89 ABNORMAL CT OF THE CHEST: ICD-10-CM

## 2018-02-15 DIAGNOSIS — Z09 HOSPITAL DISCHARGE FOLLOW-UP: Primary | ICD-10-CM

## 2018-02-15 PROCEDURE — 3017F COLORECTAL CA SCREEN DOC REV: CPT | Performed by: INTERNAL MEDICINE

## 2018-02-15 PROCEDURE — 3014F SCREEN MAMMO DOC REV: CPT | Performed by: INTERNAL MEDICINE

## 2018-02-15 PROCEDURE — G8427 DOCREV CUR MEDS BY ELIG CLIN: HCPCS | Performed by: INTERNAL MEDICINE

## 2018-02-15 PROCEDURE — 1111F DSCHRG MED/CURRENT MED MERGE: CPT | Performed by: INTERNAL MEDICINE

## 2018-02-15 PROCEDURE — G8484 FLU IMMUNIZE NO ADMIN: HCPCS | Performed by: INTERNAL MEDICINE

## 2018-02-15 PROCEDURE — 1090F PRES/ABSN URINE INCON ASSESS: CPT | Performed by: INTERNAL MEDICINE

## 2018-02-15 PROCEDURE — G8399 PT W/DXA RESULTS DOCUMENT: HCPCS | Performed by: INTERNAL MEDICINE

## 2018-02-15 PROCEDURE — G8417 CALC BMI ABV UP PARAM F/U: HCPCS | Performed by: INTERNAL MEDICINE

## 2018-02-15 PROCEDURE — 1123F ACP DISCUSS/DSCN MKR DOCD: CPT | Performed by: INTERNAL MEDICINE

## 2018-02-15 PROCEDURE — 4040F PNEUMOC VAC/ADMIN/RCVD: CPT | Performed by: INTERNAL MEDICINE

## 2018-02-15 PROCEDURE — 3023F SPIROM DOC REV: CPT | Performed by: INTERNAL MEDICINE

## 2018-02-15 PROCEDURE — 99214 OFFICE O/P EST MOD 30 MIN: CPT | Performed by: INTERNAL MEDICINE

## 2018-02-15 PROCEDURE — 4004F PT TOBACCO SCREEN RCVD TLK: CPT | Performed by: INTERNAL MEDICINE

## 2018-02-15 PROCEDURE — G8926 SPIRO NO PERF OR DOC: HCPCS | Performed by: INTERNAL MEDICINE

## 2018-02-15 NOTE — LETTER
Pulmonary, Critical Care & Sleep  [address] [facility], [address] [address]  Phone: [phone]  Fax: [phone]      February 15, 2018       Patient: Gia Soto   MR Number: [id number]   YOB: 1945   Date of Visit: 2/15/2018       Dear Dr. Kelle Floyd MD      I saw Mrs. Gia Soto today for hospital follow-up visit. Below are the relevant portions of my assessment and plan of care. Assessment:    1. Hospital discharge follow-up     2. COPD, mild ([address]     3. Abnormal CT of the chest       Plan:    · She has been recovering well since hospital discharge  · Bronchoscopy showed significant mucous plugging but cultures were negative  · She is scheduled for repeat CT scan of the chest in 3 months  · I encouraged her to stop smoking  · Continue Advair 230 twice a day, Spiriva daily and albuterol HFA when necessary  · Continue albuterol neb treatment twice a day and add a flutter valve for airway clearance  · Educated again about hiatal hernia and GERD related respiratory symptoms, continue Prilosec 40 mg daily and follow instructions  · Return to see in 3 months     If you have questions, please do not hesitate to call me. I look forward to following Dori Harmon along with you.     Sincerely,        Elissa Baca MD    CC providers:  Kelle Floyd MD  [facility], [address]

## 2018-02-15 NOTE — PROGRESS NOTES
airways type nodules present.  The majority of these appear new.   There is a   new 9 mm sized cavitary sized right lower lobe nodule.  Less pronounced   inflammatory changes are seen in the right upper lobe, right middle lobe and   left upper lobe.  A few inflammatory nodules are also present in the left   lower lobe which may reflect bronchopneumonia as well.       Interval worsened bronchial wall thickening with mucous plugging in the lower   lobes.       Recommend follow-up CT scan in 1-2 months time following treatment to ensure   resolution, given the extensive nature of the findings and extensive   background lung disease.       Skin thickening is re-demonstrated within the right breast with changes that   also likely reflect prior surgery.       Enlarged aorta at the level of the diaphragmatic hiatus, measuring 3.6 cm. See follow-up recommendations below.       The findings were sent to the Radiology Results [address] at 1:01   pm on 1/5/2018to be communicated to a licensed caregiver.      Bronchoscopy was done prior to her hospital admission and cultures were negative    Past Medical History:   Diagnosis Date    [facility]     monitored by Dr. Rondell Skiff Buerger's disease ([facility]     Cancer of vulva ([facility] 2008    microscopic invasive squamous carcinoma vulva    Cataract     COPD (chronic obstructive pulmonary disease) ([facility]     Emphysema (subcutaneous) (surgical) resulting from a procedure     History of radiation therapy      Completed external beam radiation therapy 04/23/14 total 5000 cGy to the right breast.     Hypertension     Lung bullae ([facility]     monitored by Dr. Everett Bleacher vascular dis      Current Outpatient Prescriptions   Medication Sig Dispense Refill    exemestane (AROMASIN) 25 MG chemo tablet TAKE 1 TABLET BY MOUTH ONCE A DAY FOR BREAST CANCER 90 tablet 0    clopidogrel (PLAVIX) 75 MG tablet Take 75 mg by mouth daily      OXYGEN Inhale

## 2018-02-26 LAB
FUNGUS (MYCOLOGY) CULTURE: NORMAL
FUNGUS (MYCOLOGY) CULTURE: NORMAL
FUNGUS STAIN: NORMAL
FUNGUS STAIN: NORMAL

## 2018-03-13 LAB
AFB CULTURE (MYCOBACTERIA): NORMAL
AFB CULTURE (MYCOBACTERIA): NORMAL
AFB SMEAR: NORMAL
AFB SMEAR: NORMAL

## 2018-03-16 RX ORDER — OMEPRAZOLE 40 MG/1
40 CAPSULE, DELAYED RELEASE ORAL DAILY
Qty: 30 CAPSULE | Refills: 6 | Status: SHIPPED | OUTPATIENT
Start: 2018-03-16 | End: 2018-07-05

## 2018-04-20 RX ORDER — UMECLIDINIUM 62.5 UG/1
AEROSOL, POWDER ORAL
Qty: 1 EACH | Refills: 2 | Status: SHIPPED | OUTPATIENT
Start: 2018-04-20 | End: 2018-08-14 | Stop reason: SDUPTHER

## 2018-05-07 RX ORDER — FLUTICASONE PROPIONATE AND SALMETEROL XINAFOATE 230; 21 UG/1; UG/1
AEROSOL, METERED RESPIRATORY (INHALATION)
Qty: 1 INHALER | Refills: 0 | Status: SHIPPED | OUTPATIENT
Start: 2018-05-07 | End: 2018-07-17 | Stop reason: SDUPTHER

## 2018-05-16 ENCOUNTER — TELEPHONE (OUTPATIENT)
Dept: PULMONOLOGY | Age: 73
End: 2018-05-16

## 2018-05-16 ENCOUNTER — HOSPITAL ENCOUNTER (OUTPATIENT)
Dept: CT IMAGING | Age: 73
Discharge: HOME | End: 2018-05-16
Attending: INTERNAL MEDICINE | Admitting: INTERNAL MEDICINE

## 2018-05-16 DIAGNOSIS — R91.1 PULMONARY NODULE: ICD-10-CM

## 2018-05-16 DIAGNOSIS — I71.20 THORACIC AORTIC ANEURYSM WITHOUT RUPTURE: Primary | ICD-10-CM

## 2018-05-16 DIAGNOSIS — I71.20 THORACIC AORTIC ANEURYSM WITHOUT RUPTURE: ICD-10-CM

## 2018-05-16 DIAGNOSIS — I71.9 AORTIC ANEURYSM WITHOUT RUPTURE (HCC): ICD-10-CM

## 2018-05-16 LAB
BUN BLDV-MCNC: 13 MG/DL (ref 7–20)
CREAT SERPL-MCNC: 0.9 MG/DL (ref 0.6–1.2)
GFR AFRICAN AMERICAN: >60
GFR NON-AFRICAN AMERICAN: >60

## 2018-05-17 ENCOUNTER — TELEPHONE (OUTPATIENT)
Dept: PULMONOLOGY | Age: 73
End: 2018-05-17

## 2018-05-22 ENCOUNTER — OFFICE VISIT (OUTPATIENT)
Dept: VASCULAR SURGERY | Age: 73
End: 2018-05-22

## 2018-05-22 VITALS
SYSTOLIC BLOOD PRESSURE: 144 MMHG | BODY MASS INDEX: 27.32 KG/M2 | HEIGHT: 65 IN | DIASTOLIC BLOOD PRESSURE: 82 MMHG | WEIGHT: 164 LBS

## 2018-05-22 DIAGNOSIS — I65.23 CAROTID ATHEROSCLEROSIS, BILATERAL: Primary | ICD-10-CM

## 2018-05-22 DIAGNOSIS — I71.20 THORACIC AORTIC ANEURYSM WITHOUT RUPTURE: ICD-10-CM

## 2018-05-22 DIAGNOSIS — I70.213 ATHEROSCLEROSIS OF NATIVE ARTERIES OF EXTREMITIES WITH INTERMITTENT CLAUDICATION, BILATERAL LEGS (HCC): ICD-10-CM

## 2018-05-22 PROCEDURE — G8598 ASA/ANTIPLAT THER USED: HCPCS | Performed by: SURGERY

## 2018-05-22 PROCEDURE — G8417 CALC BMI ABV UP PARAM F/U: HCPCS | Performed by: SURGERY

## 2018-05-22 PROCEDURE — 1123F ACP DISCUSS/DSCN MKR DOCD: CPT | Performed by: SURGERY

## 2018-05-22 PROCEDURE — G8399 PT W/DXA RESULTS DOCUMENT: HCPCS | Performed by: SURGERY

## 2018-05-22 PROCEDURE — 4004F PT TOBACCO SCREEN RCVD TLK: CPT | Performed by: SURGERY

## 2018-05-22 PROCEDURE — 4040F PNEUMOC VAC/ADMIN/RCVD: CPT | Performed by: SURGERY

## 2018-05-22 PROCEDURE — 3017F COLORECTAL CA SCREEN DOC REV: CPT | Performed by: SURGERY

## 2018-05-22 PROCEDURE — 99203 OFFICE O/P NEW LOW 30 MIN: CPT | Performed by: SURGERY

## 2018-05-22 PROCEDURE — 1090F PRES/ABSN URINE INCON ASSESS: CPT | Performed by: SURGERY

## 2018-05-22 PROCEDURE — G8427 DOCREV CUR MEDS BY ELIG CLIN: HCPCS | Performed by: SURGERY

## 2018-05-29 ENCOUNTER — HOSPITAL ENCOUNTER (OUTPATIENT)
Dept: VASCULAR LAB | Age: 73
Discharge: HOME | End: 2018-05-29
Attending: SURGERY | Admitting: SURGERY

## 2018-05-29 DIAGNOSIS — I70.213 ATHEROSCLEROSIS OF NATIVE ARTERY OF BOTH LOWER EXTREMITIES WITH INTERMITTENT CLAUDICATION (HCC): ICD-10-CM

## 2018-05-29 DIAGNOSIS — I70.213 ATHEROSCLEROSIS OF NATIVE ARTERIES OF EXTREMITIES WITH INTERMITTENT CLAUDICATION, BILATERAL LEGS (HCC): ICD-10-CM

## 2018-05-29 DIAGNOSIS — I65.23 CAROTID ATHEROSCLEROSIS, BILATERAL: ICD-10-CM

## 2018-05-30 ENCOUNTER — HOSPITAL ENCOUNTER (OUTPATIENT)
Dept: MAMMOGRAPHY | Age: 73
Discharge: HOME | End: 2018-05-30
Attending: SURGERY | Admitting: SURGERY

## 2018-05-30 DIAGNOSIS — Z85.3 HISTORY OF BREAST CANCER: ICD-10-CM

## 2018-05-31 ENCOUNTER — TELEPHONE (OUTPATIENT)
Dept: VASCULAR SURGERY | Age: 73
End: 2018-05-31

## 2018-05-31 DIAGNOSIS — I65.23 BILATERAL CAROTID ARTERY STENOSIS: Primary | ICD-10-CM

## 2018-06-01 ENCOUNTER — OFFICE VISIT (OUTPATIENT)
Dept: PULMONOLOGY | Age: 73
End: 2018-06-01

## 2018-06-01 VITALS
OXYGEN SATURATION: 93 % | WEIGHT: 165 LBS | HEIGHT: 65 IN | DIASTOLIC BLOOD PRESSURE: 82 MMHG | RESPIRATION RATE: 18 BRPM | HEART RATE: 69 BPM | BODY MASS INDEX: 27.49 KG/M2 | SYSTOLIC BLOOD PRESSURE: 146 MMHG

## 2018-06-01 DIAGNOSIS — J44.9 MODERATE COPD (CHRONIC OBSTRUCTIVE PULMONARY DISEASE) (HCC): Primary | ICD-10-CM

## 2018-06-01 DIAGNOSIS — R91.1 PULMONARY NODULE: ICD-10-CM

## 2018-06-01 PROCEDURE — G8926 SPIRO NO PERF OR DOC: HCPCS | Performed by: INTERNAL MEDICINE

## 2018-06-01 PROCEDURE — G8598 ASA/ANTIPLAT THER USED: HCPCS | Performed by: INTERNAL MEDICINE

## 2018-06-01 PROCEDURE — 4040F PNEUMOC VAC/ADMIN/RCVD: CPT | Performed by: INTERNAL MEDICINE

## 2018-06-01 PROCEDURE — 99213 OFFICE O/P EST LOW 20 MIN: CPT | Performed by: INTERNAL MEDICINE

## 2018-06-01 PROCEDURE — 3023F SPIROM DOC REV: CPT | Performed by: INTERNAL MEDICINE

## 2018-06-01 PROCEDURE — 4004F PT TOBACCO SCREEN RCVD TLK: CPT | Performed by: INTERNAL MEDICINE

## 2018-06-01 PROCEDURE — G8417 CALC BMI ABV UP PARAM F/U: HCPCS | Performed by: INTERNAL MEDICINE

## 2018-06-01 PROCEDURE — 3017F COLORECTAL CA SCREEN DOC REV: CPT | Performed by: INTERNAL MEDICINE

## 2018-06-01 PROCEDURE — G8427 DOCREV CUR MEDS BY ELIG CLIN: HCPCS | Performed by: INTERNAL MEDICINE

## 2018-06-01 PROCEDURE — G8399 PT W/DXA RESULTS DOCUMENT: HCPCS | Performed by: INTERNAL MEDICINE

## 2018-06-01 PROCEDURE — 1090F PRES/ABSN URINE INCON ASSESS: CPT | Performed by: INTERNAL MEDICINE

## 2018-06-01 PROCEDURE — 1123F ACP DISCUSS/DSCN MKR DOCD: CPT | Performed by: INTERNAL MEDICINE

## 2018-06-14 ENCOUNTER — OFFICE VISIT (OUTPATIENT)
Dept: SURGERY | Age: 73
End: 2018-06-14

## 2018-06-14 VITALS
WEIGHT: 165 LBS | TEMPERATURE: 97.3 F | DIASTOLIC BLOOD PRESSURE: 74 MMHG | BODY MASS INDEX: 27.49 KG/M2 | SYSTOLIC BLOOD PRESSURE: 131 MMHG | HEART RATE: 72 BPM | HEIGHT: 65 IN

## 2018-06-14 DIAGNOSIS — Z85.3 HX: BREAST CANCER: Primary | ICD-10-CM

## 2018-06-14 PROCEDURE — 99213 OFFICE O/P EST LOW 20 MIN: CPT | Performed by: SURGERY

## 2018-06-14 PROCEDURE — G8417 CALC BMI ABV UP PARAM F/U: HCPCS | Performed by: SURGERY

## 2018-06-14 PROCEDURE — 1123F ACP DISCUSS/DSCN MKR DOCD: CPT | Performed by: SURGERY

## 2018-06-14 PROCEDURE — 3017F COLORECTAL CA SCREEN DOC REV: CPT | Performed by: SURGERY

## 2018-06-14 PROCEDURE — G8427 DOCREV CUR MEDS BY ELIG CLIN: HCPCS | Performed by: SURGERY

## 2018-06-14 PROCEDURE — G8399 PT W/DXA RESULTS DOCUMENT: HCPCS | Performed by: SURGERY

## 2018-06-14 PROCEDURE — 4004F PT TOBACCO SCREEN RCVD TLK: CPT | Performed by: SURGERY

## 2018-06-14 PROCEDURE — 4040F PNEUMOC VAC/ADMIN/RCVD: CPT | Performed by: SURGERY

## 2018-06-14 PROCEDURE — G8598 ASA/ANTIPLAT THER USED: HCPCS | Performed by: SURGERY

## 2018-06-14 PROCEDURE — 1090F PRES/ABSN URINE INCON ASSESS: CPT | Performed by: SURGERY

## 2018-06-14 ASSESSMENT — ENCOUNTER SYMPTOMS
SHORTNESS OF BREATH: 1
GASTROINTESTINAL NEGATIVE: 1

## 2018-06-15 ENCOUNTER — TELEPHONE (OUTPATIENT)
Dept: PULMONOLOGY | Age: 73
End: 2018-06-15

## 2018-06-20 ENCOUNTER — HOSPITAL ENCOUNTER (OUTPATIENT)
Dept: OTHER | Age: 73
Discharge: HOME | End: 2018-06-20
Attending: INTERNAL MEDICINE | Admitting: INTERNAL MEDICINE

## 2018-06-20 VITALS — HEIGHT: 65 IN | BODY MASS INDEX: 27.49 KG/M2 | WEIGHT: 165 LBS

## 2018-06-20 DIAGNOSIS — R91.1 LUNG NODULE: ICD-10-CM

## 2018-06-20 RX ORDER — FLUDEOXYGLUCOSE F 18 200 MCI/ML
15.9 INJECTION, SOLUTION INTRAVENOUS
Status: COMPLETED | OUTPATIENT
Start: 2018-06-20 | End: 2018-06-20

## 2018-06-20 RX ADMIN — FLUDEOXYGLUCOSE F 18 15.9 MILLICURIE: 200 INJECTION, SOLUTION INTRAVENOUS at 10:45

## 2018-06-26 ENCOUNTER — TELEPHONE (OUTPATIENT)
Dept: PULMONOLOGY | Age: 73
End: 2018-06-26

## 2018-06-27 ENCOUNTER — OFFICE VISIT (OUTPATIENT)
Dept: PULMONOLOGY | Age: 73
End: 2018-06-27

## 2018-06-27 VITALS
BODY MASS INDEX: 27.62 KG/M2 | HEART RATE: 76 BPM | RESPIRATION RATE: 20 BRPM | WEIGHT: 166 LBS | SYSTOLIC BLOOD PRESSURE: 123 MMHG | DIASTOLIC BLOOD PRESSURE: 71 MMHG | OXYGEN SATURATION: 96 %

## 2018-06-27 DIAGNOSIS — J44.9 MODERATE COPD (CHRONIC OBSTRUCTIVE PULMONARY DISEASE) (HCC): Primary | ICD-10-CM

## 2018-06-27 DIAGNOSIS — R91.1 PULMONARY NODULE: ICD-10-CM

## 2018-06-27 PROCEDURE — 3023F SPIROM DOC REV: CPT | Performed by: INTERNAL MEDICINE

## 2018-06-27 PROCEDURE — 4004F PT TOBACCO SCREEN RCVD TLK: CPT | Performed by: INTERNAL MEDICINE

## 2018-06-27 PROCEDURE — 4040F PNEUMOC VAC/ADMIN/RCVD: CPT | Performed by: INTERNAL MEDICINE

## 2018-06-27 PROCEDURE — G8417 CALC BMI ABV UP PARAM F/U: HCPCS | Performed by: INTERNAL MEDICINE

## 2018-06-27 PROCEDURE — 99214 OFFICE O/P EST MOD 30 MIN: CPT | Performed by: INTERNAL MEDICINE

## 2018-06-27 PROCEDURE — 1123F ACP DISCUSS/DSCN MKR DOCD: CPT | Performed by: INTERNAL MEDICINE

## 2018-06-27 PROCEDURE — G8926 SPIRO NO PERF OR DOC: HCPCS | Performed by: INTERNAL MEDICINE

## 2018-06-27 PROCEDURE — G8598 ASA/ANTIPLAT THER USED: HCPCS | Performed by: INTERNAL MEDICINE

## 2018-06-27 PROCEDURE — 1090F PRES/ABSN URINE INCON ASSESS: CPT | Performed by: INTERNAL MEDICINE

## 2018-06-27 PROCEDURE — G8399 PT W/DXA RESULTS DOCUMENT: HCPCS | Performed by: INTERNAL MEDICINE

## 2018-06-27 PROCEDURE — 3017F COLORECTAL CA SCREEN DOC REV: CPT | Performed by: INTERNAL MEDICINE

## 2018-06-27 PROCEDURE — G8427 DOCREV CUR MEDS BY ELIG CLIN: HCPCS | Performed by: INTERNAL MEDICINE

## 2018-06-28 ENCOUNTER — TELEPHONE (OUTPATIENT)
Dept: PULMONOLOGY | Age: 73
End: 2018-06-28

## 2018-06-29 ENCOUNTER — TELEPHONE (OUTPATIENT)
Dept: PULMONOLOGY | Age: 73
End: 2018-06-29

## 2018-07-09 ENCOUNTER — HOSPITAL ENCOUNTER (OUTPATIENT)
Dept: CT IMAGING | Age: 73
Discharge: HOME | End: 2018-07-09
Attending: INTERNAL MEDICINE | Admitting: INTERNAL MEDICINE

## 2018-07-09 VITALS
OXYGEN SATURATION: 96 % | BODY MASS INDEX: 27.49 KG/M2 | RESPIRATION RATE: 16 BRPM | WEIGHT: 165 LBS | HEIGHT: 65 IN | DIASTOLIC BLOOD PRESSURE: 71 MMHG | TEMPERATURE: 98.5 F | HEART RATE: 70 BPM | SYSTOLIC BLOOD PRESSURE: 141 MMHG

## 2018-07-09 DIAGNOSIS — R91.1 PULMONARY NODULE: ICD-10-CM

## 2018-07-09 LAB
A/G RATIO: 1.6 (ref 1.1–2.2)
ALBUMIN SERPL-MCNC: 4.1 G/DL (ref 3.4–5)
ALP BLD-CCNC: 77 U/L (ref 40–129)
ALT SERPL-CCNC: 11 U/L (ref 10–40)
ANION GAP SERPL CALCULATED.3IONS-SCNC: 11 MMOL/L (ref 3–16)
APTT: 31.4 SEC (ref 26–36)
AST SERPL-CCNC: 13 U/L (ref 15–37)
BASOPHILS ABSOLUTE: 0.1 K/UL (ref 0–0.2)
BASOPHILS RELATIVE PERCENT: 1.2 %
BILIRUB SERPL-MCNC: 0.3 MG/DL (ref 0–1)
BUN BLDV-MCNC: 11 MG/DL (ref 7–20)
CALCIUM SERPL-MCNC: 9.5 MG/DL (ref 8.3–10.6)
CHLORIDE BLD-SCNC: 106 MMOL/L (ref 99–110)
CO2: 25 MMOL/L (ref 21–32)
CREAT SERPL-MCNC: 0.8 MG/DL (ref 0.6–1.2)
EOSINOPHILS ABSOLUTE: 0.4 K/UL (ref 0–0.6)
EOSINOPHILS RELATIVE PERCENT: 3.9 %
GFR AFRICAN AMERICAN: >60
GFR NON-AFRICAN AMERICAN: >60
GLOBULIN: 2.6 G/DL
GLUCOSE BLD-MCNC: 97 MG/DL (ref 70–99)
HCT VFR BLD CALC: 42.6 % (ref 36–48)
HEMOGLOBIN: 13.9 G/DL (ref 12–16)
INR BLD: 1.03 (ref 0.86–1.14)
LYMPHOCYTES ABSOLUTE: 2.4 K/UL (ref 1–5.1)
LYMPHOCYTES RELATIVE PERCENT: 20.7 %
MCH RBC QN AUTO: 29.8 PG (ref 26–34)
MCHC RBC AUTO-ENTMCNC: 32.6 G/DL (ref 31–36)
MCV RBC AUTO: 91.4 FL (ref 80–100)
MONOCYTES ABSOLUTE: 0.6 K/UL (ref 0–1.3)
MONOCYTES RELATIVE PERCENT: 4.9 %
NEUTROPHILS ABSOLUTE: 7.9 K/UL (ref 1.7–7.7)
NEUTROPHILS RELATIVE PERCENT: 69.3 %
PDW BLD-RTO: 14.5 % (ref 12.4–15.4)
PLATELET # BLD: 162 K/UL (ref 135–450)
PMV BLD AUTO: 10.1 FL (ref 5–10.5)
POTASSIUM SERPL-SCNC: 3.4 MMOL/L (ref 3.5–5.1)
PROTHROMBIN TIME: 11.7 SEC (ref 9.8–13)
RBC # BLD: 4.66 M/UL (ref 4–5.2)
SODIUM BLD-SCNC: 142 MMOL/L (ref 136–145)
TOTAL PROTEIN: 6.7 G/DL (ref 6.4–8.2)
WBC # BLD: 11.4 K/UL (ref 4–11)

## 2018-07-09 RX ORDER — MIDAZOLAM HYDROCHLORIDE 1 MG/ML
INJECTION INTRAMUSCULAR; INTRAVENOUS
Status: COMPLETED | OUTPATIENT
Start: 2018-07-09 | End: 2018-07-09

## 2018-07-09 RX ORDER — ONDANSETRON 2 MG/ML
4 INJECTION INTRAMUSCULAR; INTRAVENOUS EVERY 8 HOURS PRN
Status: DISCONTINUED | OUTPATIENT
Start: 2018-07-09 | End: 2018-07-10 | Stop reason: HOSPADM

## 2018-07-09 RX ORDER — FENTANYL CITRATE 50 UG/ML
INJECTION, SOLUTION INTRAMUSCULAR; INTRAVENOUS
Status: COMPLETED | OUTPATIENT
Start: 2018-07-09 | End: 2018-07-09

## 2018-07-09 RX ORDER — ACETAMINOPHEN 325 MG/1
650 TABLET ORAL EVERY 4 HOURS PRN
Status: DISCONTINUED | OUTPATIENT
Start: 2018-07-09 | End: 2018-07-10 | Stop reason: HOSPADM

## 2018-07-09 RX ADMIN — FENTANYL CITRATE 25 MCG: 50 INJECTION, SOLUTION INTRAMUSCULAR; INTRAVENOUS at 08:46

## 2018-07-09 RX ADMIN — MIDAZOLAM HYDROCHLORIDE 0.5 MG: 1 INJECTION INTRAMUSCULAR; INTRAVENOUS at 08:50

## 2018-07-09 RX ADMIN — MIDAZOLAM HYDROCHLORIDE 0.5 MG: 1 INJECTION INTRAMUSCULAR; INTRAVENOUS at 08:46

## 2018-07-09 RX ADMIN — FENTANYL CITRATE 25 MCG: 50 INJECTION, SOLUTION INTRAMUSCULAR; INTRAVENOUS at 08:50

## 2018-07-09 ASSESSMENT — PAIN - FUNCTIONAL ASSESSMENT: PAIN_FUNCTIONAL_ASSESSMENT: 0-10

## 2018-07-09 NOTE — PRE SEDATION
Sedation Pre-Procedure Note    Patient Name: Glenny Castellon   YOB: 1945  Room/Bed: Room/bed info not found  Medical Record Number: [id number]  Date: 7/9/2018   Time: 8:03 AM       Indication:  Left upper lobe lung lesion    Consent: I have discussed with the patient and/or the patient representative the indication, alternatives, and the possible risks and/or complications of the planned procedure and the anesthesia methods. The patient and/or patient representative appear to understand and agree to proceed. Vital Signs:   Vitals:    07/09/18 0754   BP: (!) 167/79   Pulse: 74   Resp: 18   Temp: 98.5 °F (36.9 °C)   SpO2: 94%       Past Medical History:   has a past medical history of Aortic aneurysm ([facility] Arthritis; Buerger's disease ([address] Cancer of vulva ([facility] Cataract; COPD (chronic obstructive pulmonary disease) ([facility] Emphysema (subcutaneous) (surgical) resulting from a procedure; History of radiation therapy; Hypertension; Lung bullae ([address] and Periph vascular dis. Past Surgical History:   has a past surgical history that includes Breast surgery; Knee arthroscopy; Vulvectomy (); other surgical history (3-2010); eye surgery; knee surgery (9/1/10); vascular surgery; Breast lumpectomy (Right, 2/6/14); other surgical history; other surgical history (Left, 2/16/15); joint replacement (Bilateral); and bronchoscopy (01/25/2018). Medications:   Scheduled Meds:   Continuous Infusions:   PRN Meds:   Home Meds:   Prior to Admission medications    Medication Sig Start Date End Date Taking?  Authorizing Provider   ADVAIR -21 MCG/ACT inhaler INHALE 2 PUFFS BY MOUTH TWICE A DAY 5/7/18   Radha Leahy MD   INCRUSE ELLIPTA 62.5 MCG/INH AEPB USE 1 INHALATION DAILY AS DIRECTED 4/20/18   Radha Leahy MD   tiotropium (SPIRIVA RESPIMAT) 2.5 MCG/ACT AERS inhaler Inhale 2 puffs into the lungs daily 12/8/17   Radha Leahy MD   exemestane (AROMASIN) 25 MG chemo tablet TAKE 1 TABLET BY MOUTH ONCE A DAY FOR BREAST CANCER 5/12/17   Román Gandara MD   clopidogrel (PLAVIX) 75 MG tablet Take 75 mg by mouth daily    Historical Provider, MD   OXYGEN Inhale into the lungs At night prn    Historical Provider, MD   FLUoxetine (PROZAC) 20 MG capsule Take 3 capsules by mouth daily 6/10/15   Abdiel Cuadra APRN - CNP   HYDROcodone-acetaminophen (NORCO)  MG per tablet Take 1 tablet by mouth every 6 hours as needed for Pain. 2/16/15   Rachell Boggs MD   bisacodyl (DULCOLAX) 5 MG EC tablet Take 5 mg by mouth nightly as needed for Constipation. Historical Provider, MD   levothyroxine (SYNTHROID) 50 MCG tablet Take 50 mcg by mouth Daily. Historical Provider, MD   albuterol (PROVENTIL HFA;VENTOLIN HFA) 108 (90 BASE) MCG/ACT inhaler Inhale 2 puffs into the lungs every 6 hours as needed for Wheezing. Historical Provider, MD   guaifenesin (MUCINEX) 600 MG SR tablet Take 800 mg by mouth 3 times daily. Historical Provider, MD   potassium chloride SA (K-DUR;KLOR-CON) 20 MEQ tablet Take 20 mEq by mouth 2 times daily. Historical Provider, MD   pregabalin (LYRICA) 150 MG capsule Take 150 mg by mouth 2 times daily. Historical Provider, MD   rosuvastatin (CRESTOR) 10 MG tablet Take 20 mg by mouth daily. Historical Provider, MD   lisinopril (PRINIVIL;ZESTRIL) 10 MG tablet Take 20 mg by mouth daily     Historical Provider, MD   lorazepam (ATIVAN) 1 MG tablet Take 3 mg by mouth every evening. 3/8/10   Historical Provider, MD   fluticasone (FLONASE) 50 MCG/ACT nasal spray 2 sprays by Nasal route daily. Historical Provider, MD     Coumadin Use Last 7 Days:  no  Antiplatelet drug therapy use last 7 days: yes - plavix held 6 days  Other anticoagulant use last 7 days: no  Additional Medication Information:        Pre-Sedation Documentation and Exam:   I have reviewed the patient's history and review of systems.     Mallampati Airway Assessment:  Mallampati Class II - (soft palate, fauces & uvula are visible)    Prior History

## 2018-07-11 ENCOUNTER — TELEPHONE (OUTPATIENT)
Dept: PULMONOLOGY | Age: 73
End: 2018-07-11

## 2018-07-11 DIAGNOSIS — R91.1 PULMONARY NODULE: Primary | ICD-10-CM

## 2018-07-18 RX ORDER — FLUTICASONE PROPIONATE AND SALMETEROL XINAFOATE 230; 21 UG/1; UG/1
AEROSOL, METERED RESPIRATORY (INHALATION)
Qty: 1 INHALER | Refills: 2 | Status: SHIPPED | OUTPATIENT
Start: 2018-07-18 | End: 2018-10-29 | Stop reason: SDUPTHER

## 2018-07-25 ENCOUNTER — HOSPITAL ENCOUNTER (OUTPATIENT)
Dept: ULTRASOUND IMAGING | Age: 73
Discharge: HOME | End: 2018-07-25
Attending: INTERNAL MEDICINE | Admitting: INTERNAL MEDICINE

## 2018-07-25 DIAGNOSIS — E04.9 ENLARGEMENT OF THYROID: ICD-10-CM

## 2018-07-25 DIAGNOSIS — E04.9 NONTOXIC GOITER: ICD-10-CM

## 2018-07-25 RX ORDER — LEVALBUTEROL INHALATION SOLUTION 0.63 MG/3ML
0.63 SOLUTION RESPIRATORY (INHALATION) EVERY 4 HOURS PRN
Qty: 360 ML | Refills: 2 | Status: SHIPPED | OUTPATIENT
Start: 2018-07-25 | End: 2018-09-26 | Stop reason: SDUPTHER

## 2018-08-13 ENCOUNTER — TELEPHONE (OUTPATIENT)
Dept: PULMONOLOGY | Age: 73
End: 2018-08-13

## 2018-08-15 RX ORDER — UMECLIDINIUM 62.5 UG/1
AEROSOL, POWDER ORAL
Qty: 1 EACH | Refills: 2 | Status: SHIPPED | OUTPATIENT
Start: 2018-08-15 | End: 2018-12-03 | Stop reason: SDUPTHER

## 2018-09-25 NOTE — TELEPHONE ENCOUNTER
REFILLS:     Type of Medication: levalbuterol nebulizer solution    Dosage: 0.63 mg    How are you taking: every 4 hours PRN    Pharmacy and Location: [facility] [facility] phone number: [phone]    How many doses do you have left: 1    When was the last appointment: 6/27/18     [phone]

## 2018-09-26 ENCOUNTER — HOSPITAL ENCOUNTER (OUTPATIENT)
Dept: CT IMAGING | Age: 73
Discharge: HOME | End: 2018-09-26
Payer: MEDICARE

## 2018-09-26 DIAGNOSIS — R91.1 PULMONARY NODULE: ICD-10-CM

## 2018-09-26 PROCEDURE — 71250 CT THORAX DX C-: CPT

## 2018-09-26 RX ORDER — LEVALBUTEROL INHALATION SOLUTION 0.63 MG/3ML
SOLUTION RESPIRATORY (INHALATION)
Qty: 1350 ML | Refills: 0 | OUTPATIENT
Start: 2018-09-26

## 2018-09-26 RX ORDER — LEVALBUTEROL INHALATION SOLUTION 0.63 MG/3ML
0.63 SOLUTION RESPIRATORY (INHALATION) EVERY 4 HOURS PRN
Qty: 360 ML | Refills: 0 | Status: SHIPPED | OUTPATIENT
Start: 2018-09-26 | End: 2018-12-03 | Stop reason: SDUPTHER

## 2018-09-26 NOTE — TELEPHONE ENCOUNTER
Pt called in to check the status of the refill, stated she is now completely out and would like this filled today as she is sick.      Pt's phone lines are down, she can be reached at [phone]

## 2018-09-28 ENCOUNTER — OFFICE VISIT (OUTPATIENT)
Dept: PULMONOLOGY | Age: 73
End: 2018-09-28
Payer: MEDICARE

## 2018-09-28 VITALS
WEIGHT: 164 LBS | HEART RATE: 75 BPM | OXYGEN SATURATION: 96 % | HEIGHT: 65 IN | SYSTOLIC BLOOD PRESSURE: 137 MMHG | RESPIRATION RATE: 18 BRPM | DIASTOLIC BLOOD PRESSURE: 76 MMHG | BODY MASS INDEX: 27.32 KG/M2

## 2018-09-28 DIAGNOSIS — Z72.0 TOBACCO ABUSE: ICD-10-CM

## 2018-09-28 DIAGNOSIS — J44.9 MODERATE COPD (CHRONIC OBSTRUCTIVE PULMONARY DISEASE) (HCC): Primary | ICD-10-CM

## 2018-09-28 DIAGNOSIS — R91.1 PULMONARY NODULE, LEFT: ICD-10-CM

## 2018-09-28 PROCEDURE — 1123F ACP DISCUSS/DSCN MKR DOCD: CPT | Performed by: INTERNAL MEDICINE

## 2018-09-28 PROCEDURE — 99214 OFFICE O/P EST MOD 30 MIN: CPT | Performed by: INTERNAL MEDICINE

## 2018-09-28 PROCEDURE — G8399 PT W/DXA RESULTS DOCUMENT: HCPCS | Performed by: INTERNAL MEDICINE

## 2018-09-28 PROCEDURE — G8427 DOCREV CUR MEDS BY ELIG CLIN: HCPCS | Performed by: INTERNAL MEDICINE

## 2018-09-28 PROCEDURE — 3017F COLORECTAL CA SCREEN DOC REV: CPT | Performed by: INTERNAL MEDICINE

## 2018-09-28 PROCEDURE — G8598 ASA/ANTIPLAT THER USED: HCPCS | Performed by: INTERNAL MEDICINE

## 2018-09-28 PROCEDURE — G8417 CALC BMI ABV UP PARAM F/U: HCPCS | Performed by: INTERNAL MEDICINE

## 2018-09-28 PROCEDURE — 3023F SPIROM DOC REV: CPT | Performed by: INTERNAL MEDICINE

## 2018-09-28 PROCEDURE — G8926 SPIRO NO PERF OR DOC: HCPCS | Performed by: INTERNAL MEDICINE

## 2018-09-28 PROCEDURE — 4040F PNEUMOC VAC/ADMIN/RCVD: CPT | Performed by: INTERNAL MEDICINE

## 2018-09-28 PROCEDURE — 1090F PRES/ABSN URINE INCON ASSESS: CPT | Performed by: INTERNAL MEDICINE

## 2018-09-28 PROCEDURE — 1101F PT FALLS ASSESS-DOCD LE1/YR: CPT | Performed by: INTERNAL MEDICINE

## 2018-09-28 PROCEDURE — 4004F PT TOBACCO SCREEN RCVD TLK: CPT | Performed by: INTERNAL MEDICINE

## 2018-09-28 NOTE — PROGRESS NOTES
Chief Complaint     Chief Complaint   Patient presents with    COPD     follow up from pt's CT Chest     Patient Reported some recurrent episodes of congestion and cough since her last visit with me  She has been treated by her primary care physician with multiple courses of antibiotics  She is currently on doxycycline course  She is on room air she does have some shortness of breath and dyspnea on exertion  She denies any fever, chills, productive cough, diaphoresis, weight loss or hemoptysis  She has been using her inhalers on regular bases  She continues to smoke unfortunately  She does have oxygen at home and use that as needed   She also uses nebulizer treatment 3-4 times per day  He has hiatal hernia and GERD symptoms  She has been a smoker for 54 years and continues to smoke  Repeat CT scan of the chest was read as  EXAMINATION:   CT OF THE CHEST WITHOUT CONTRAST 9/26/2018 7:01 pm       TECHNIQUE:   CT of the chest was performed without the administration of intravenous   contrast. Multiplanar reformatted images are provided for review. Dose   modulation, iterative reconstruction, and/or weight based adjustment of the   mA/kV was utilized to reduce the radiation dose to as low as reasonably   achievable.       COMPARISON:   CT chest biopsy 07/09/2018; PET-CT 06/20/2018; CT chest exams from 05/16/2018       HISTORY:   ORDERING SYSTEM PROVIDED HISTORY: Pulmonary nodule   TECHNOLOGIST PROVIDED HISTORY:   Reason for exam:->abnormal CT Chest   Ordering Physician Provided Reason for Exam: pulmonary nodule       FINDINGS:   Mediastinum: Unchanged subcentimeter short axis mediastinal lymph nodes. Mildly enlarged main pulmonary artery measuring 3.2 cm in diameter.  Normal   caliber ascending thoracic aorta.  Unchanged descending thoracic ectasia. Normal heart size.  Moderate multi vessel coronary calcifications.  No   pericardial effusion or thickening.  Unremarkable esophagus.       Lungs/pleura:  Advanced Disease Brother     Breast Cancer Maternal Aunt      x2     Social History     Social History    Marital status:      Spouse name: N/A    Number of children: N/A    Years of education: N/A     Occupational History    Not on file. Social History Main Topics    Smoking status: Current Every Day Smoker     Packs/day: 0.50     Years: 57.00     Types: Cigarettes    Smokeless tobacco: Never Used      Comment: started to smoke at 16 / smoked up to 2 p.p.d / now smokes 10 to 15 cigarettes a day    Alcohol use No    Drug use: Yes      Comment: marijuana    Sexual activity: Not on file     Other Topics Concern    Not on file     Social History Narrative    No narrative on file         Review of Systems   Constitutional: Negative. Negative for fever, chills, diaphoresis, activity change, appetite change, fatigue and unexpected weight change. HENT: Negative. Negative for hearing loss, ear pain, nosebleeds, congestion, facial swelling, rhinorrhea, sneezing, neck pain, neck stiffness, postnasal drip, sinus pressure and ear discharge. Eyes: Negative. Negative for photophobia, pain, discharge, redness, itching and visual disturbance. Respiratory: As per HPI  Cardiovascular: Negative. Negative for chest pain, palpitations and leg swelling. Gastrointestinal: Negative. Negative for abdominal pain, blood in stool, abdominal distention and anal bleeding. Genitourinary: Negative. Negative for dysuria, urgency, frequency, hematuria, decreased urine volume, enuresis and difficulty urinating. Musculoskeletal: Negative. Negative for myalgias, back pain, joint swelling, arthralgias and gait problem. Skin: Negative. Negative for color change and pallor. Neurological: Negative. Negative for dizziness, tremors, seizures, syncope, facial asymmetry, speech difficulty, weakness, light-headedness, numbness and headaches. Hematological: Negative. Negative for adenopathy.    Psychiatric/Behavioral: Negative. Negative for hallucinations, behavioral problems, confusion and agitation. The patient is not nervous/anxious and is not hyperactive. Blood pressure (!) 140/79, pulse 79, resp. rate 18, height 5' 5\" (1.651 m), weight 174 lb (78.9 kg), last menstrual period 03/11/1991, SpO2 94 %, not currently breastfeeding. Physical Exam   Constitutional: Oriented. Well-developed and well-nourished. No distress. HENT:   Head: Normocephalic and atraumatic. Mouth/Throat: Oropharynx is clear and moist. No oropharyngeal exudate. Eyes: Conjunctivae and extraocular motions are normal. Pupils are equal, round, and reactive to light. Right eye exhibits no discharge. Left eye exhibits no discharge. Neck: Normal range of motion. Neck supple. No JVD present. Carotid bruit is not present. No rigidity. No tracheal deviation present. No thyromegaly present. Cardiovascular: Normal rate, regular rhythm, S1&S2 and intact distal pulses. Pulmonary/Chest: Diminished breath sounds. Has no wheezes. Has no rhonchi. Has no rales. Exhibits no tenderness. Abdominal: Soft. Bowel sounds are normal. Exhibits no shifting dullness, no distension and no mass. No tenderness. Has no rebound and no guarding. Musculoskeletal: Normal range of motion. Exhibits no edema and no tenderness. Lymphadenopathy:     Has no cervical adenopathy. Has no axillary adenopathy. Neurological: Alert and oriented. Has normal reflexes. No cranial nerve deficit. Exhibits normal muscle tone. Coordination normal.   Skin: Skin is warm and dry. No rash noted. No erythema. Psychiatric: Has a normal mood and affect. Behavior is normal. Thought content normal.      Assessment:     Diagnosis Orders   1. Moderate COPD (chronic obstructive pulmonary disease) (HCC)     2. Pulmonary nodule, left     3.  Tobacco abuse                 Plan:    · Discussed with patient and her daughter the above  · I reviewed her Previous biopsy results  · I explained findings on her recent CT chest on the monitor, I will refer her back to interventional radiology for biopsy of that pulmonary nodule with concern about malignancy  · I encouraged her to stop smoking  · Continue Advair 230 twice a day, Spiriva daily and albuterol HFA when necessary  · Continue albuterol neb treatment twice a day and add a flutter valve for airway clearance  · Educated her again about GERD related respiratory complications and gave her instructions to follow  · I had a lengthy discussion with patient and daughter regarding diagnosis and treatment plan. Over 27 minutes were spent during visit, half of which was face to face discussion and included counseling on current condition and treatment.   · Return to see in 1 month

## 2018-09-28 NOTE — LETTER
[facility], [address], [address] [place] [address]  Phone: [phone]  Fax: [phone]      September 28, 2018       Patient: Glenny Castellon   MR Number: [id number]   YOB: 1945   Date of Visit: 9/28/2018       Dear Dr. Luz Elena Cabral MD      I saw Mrs. Glenny Castellon negative for follow-up visit. Below are the relevant portions of my assessment and plan of care. Assessment:     Diagnosis Orders   1. Moderate COPD (chronic obstructive pulmonary disease) (HCC)     2. Pulmonary nodule, left     3. Tobacco abuse       Plan:    · Discussed with patient and her daughter the above  · I reviewed her Previous biopsy results  · I explained findings on her recent CT chest on the monitor, I will refer her back to interventional radiology for biopsy of that pulmonary nodule with concern about malignancy  · I encouraged her to stop smoking  · Continue Advair 230 twice a day, Spiriva daily and albuterol HFA when necessary  · Continue albuterol neb treatment twice a day and add a flutter valve for airway clearance  · Educated her again about GERD related respiratory complications and gave her instructions to follow  ·   · I had a lengthy discussion with patient and daughter regarding diagnosis and treatment plan. Over 27 minutes were spent during visit, half of which was face to face discussion and included counseling on current condition and treatment. · Return to see in 1 month     If you have questions, please do not hesitate to call me. I look forward to following Alesha Carrillo along with you.     Sincerely,        Radha Leahy MD    CC providers:  Luz Elena Cabral MD  [address]  [address]

## 2018-10-04 ENCOUNTER — HOSPITAL ENCOUNTER (OUTPATIENT)
Dept: GENERAL RADIOLOGY | Age: 73
Discharge: HOME | End: 2018-10-04
Payer: MEDICARE

## 2018-10-04 ENCOUNTER — HOSPITAL ENCOUNTER (OUTPATIENT)
Dept: CT IMAGING | Age: 73
Discharge: HOME | End: 2018-10-04
Payer: MEDICARE

## 2018-10-04 VITALS
HEIGHT: 65 IN | WEIGHT: 164 LBS | SYSTOLIC BLOOD PRESSURE: 136 MMHG | RESPIRATION RATE: 16 BRPM | HEART RATE: 65 BPM | TEMPERATURE: 97 F | BODY MASS INDEX: 27.32 KG/M2 | OXYGEN SATURATION: 93 % | DIASTOLIC BLOOD PRESSURE: 70 MMHG

## 2018-10-04 DIAGNOSIS — R91.8 PULMONARY NODULES: ICD-10-CM

## 2018-10-04 LAB
APTT: 30.3 SEC (ref 26–36)
INR BLD: 0.99 (ref 0.86–1.14)
PROTHROMBIN TIME: 11.3 SEC (ref 9.8–13)

## 2018-10-04 PROCEDURE — 6360000002 HC RX W HCPCS: Performed by: RADIOLOGY

## 2018-10-04 PROCEDURE — 71045 X-RAY EXAM CHEST 1 VIEW: CPT

## 2018-10-04 PROCEDURE — 7100000011 HC PHASE II RECOVERY - ADDTL 15 MIN

## 2018-10-04 PROCEDURE — 32405 CT NEEDLE BIOPSY LUNG PERCUTANEOUS: CPT

## 2018-10-04 PROCEDURE — 88305 TISSUE EXAM BY PATHOLOGIST: CPT

## 2018-10-04 PROCEDURE — 85730 THROMBOPLASTIN TIME PARTIAL: CPT

## 2018-10-04 PROCEDURE — 85610 PROTHROMBIN TIME: CPT

## 2018-10-04 PROCEDURE — 36415 COLL VENOUS BLD VENIPUNCTURE: CPT

## 2018-10-04 PROCEDURE — 7100000010 HC PHASE II RECOVERY - FIRST 15 MIN

## 2018-10-04 RX ORDER — FENTANYL CITRATE 50 UG/ML
INJECTION, SOLUTION INTRAMUSCULAR; INTRAVENOUS
Status: COMPLETED | OUTPATIENT
Start: 2018-10-04 | End: 2018-10-04

## 2018-10-04 RX ORDER — MIDAZOLAM HYDROCHLORIDE 1 MG/ML
INJECTION INTRAMUSCULAR; INTRAVENOUS
Status: COMPLETED | OUTPATIENT
Start: 2018-10-04 | End: 2018-10-04

## 2018-10-04 RX ORDER — AMLODIPINE BESYLATE 5 MG/1
5 TABLET ORAL DAILY
COMMUNITY

## 2018-10-04 RX ADMIN — FENTANYL CITRATE 25 MCG: 50 INJECTION INTRAMUSCULAR; INTRAVENOUS at 08:25

## 2018-10-04 RX ADMIN — MIDAZOLAM HYDROCHLORIDE 0.5 MG: 1 INJECTION INTRAMUSCULAR; INTRAVENOUS at 08:25

## 2018-10-04 RX ADMIN — MIDAZOLAM HYDROCHLORIDE 0.5 MG: 1 INJECTION INTRAMUSCULAR; INTRAVENOUS at 08:34

## 2018-10-04 RX ADMIN — FENTANYL CITRATE 25 MCG: 50 INJECTION INTRAMUSCULAR; INTRAVENOUS at 08:34

## 2018-10-04 NOTE — PRE SEDATION
BY MOUTH TWICE A DAY 7/18/18  Yes Katey Nicole MD   tiotropium (SPIRIVA RESPIMAT) 2.5 MCG/ACT AERS inhaler Inhale 2 puffs into the lungs daily 12/8/17  Yes Katey Nicole MD   exemestane (AROMASIN) 25 MG chemo tablet TAKE 1 TABLET BY MOUTH ONCE A DAY FOR BREAST CANCER 5/12/17  Yes José Luis Cornejo MD   clopidogrel (PLAVIX) 75 MG tablet Take 75 mg by mouth daily   Yes Historical Provider, MD   FLUoxetine (PROZAC) 20 MG capsule Take 3 capsules by mouth daily 6/10/15  Yes SUSIE Shell - CHANTELLE   HYDROcodone-acetaminophen (NORCO)  MG per tablet Take 1 tablet by mouth every 6 hours as needed for Pain. 2/16/15  Yes Wiley Nunn MD   bisacodyl (DULCOLAX) 5 MG EC tablet Take 5 mg by mouth nightly as needed for Constipation. Yes Historical Provider, MD   levothyroxine (SYNTHROID) 50 MCG tablet Take 50 mcg by mouth Daily. Yes Historical Provider, MD   albuterol (PROVENTIL HFA;VENTOLIN HFA) 108 (90 BASE) MCG/ACT inhaler Inhale 2 puffs into the lungs every 6 hours as needed for Wheezing. Yes Historical Provider, MD   guaifenesin (MUCINEX) 600 MG SR tablet Take 800 mg by mouth 3 times daily. Yes Historical Provider, MD   pregabalin (LYRICA) 150 MG capsule Take 150 mg by mouth 2 times daily. Yes Historical Provider, MD   rosuvastatin (CRESTOR) 10 MG tablet Take 20 mg by mouth daily. Yes Historical Provider, MD   lisinopril (PRINIVIL;ZESTRIL) 10 MG tablet Take 20 mg by mouth daily    Yes Historical Provider, MD   fluticasone (FLONASE) 50 MCG/ACT nasal spray 2 sprays by Nasal route daily. Yes Historical Provider, MD   OXYGEN Inhale into the lungs At night prn    Historical Provider, MD   potassium chloride SA (K-DUR;KLOR-CON) 20 MEQ tablet Take 20 mEq by mouth 2 times daily. Historical Provider, MD   lorazepam (ATIVAN) 1 MG tablet Take 3 mg by mouth every evening.  3/8/10   Historical Provider, MD     Coumadin Use Last 7 Days:  no  Antiplatelet drug therapy use last 7 days: yes - Plavix (held)  Other

## 2018-10-09 ENCOUNTER — TELEPHONE (OUTPATIENT)
Dept: PULMONOLOGY | Age: 73
End: 2018-10-09

## 2018-10-09 DIAGNOSIS — C34.92 SQUAMOUS CELL CARCINOMA LUNG, LEFT (HCC): Primary | ICD-10-CM

## 2018-10-10 NOTE — TELEPHONE ENCOUNTER
Pt calling for her biopsy results - pt has an appt with Dr Darrell Nesbitt on 10/17/18 (Wednesday) at 10:45

## 2018-10-17 ENCOUNTER — OFFICE VISIT (OUTPATIENT)
Dept: CARDIOTHORACIC SURGERY | Age: 73
End: 2018-10-17
Payer: MEDICARE

## 2018-10-17 VITALS
HEIGHT: 65 IN | SYSTOLIC BLOOD PRESSURE: 152 MMHG | TEMPERATURE: 97.5 F | HEART RATE: 62 BPM | OXYGEN SATURATION: 97 % | BODY MASS INDEX: 27.32 KG/M2 | WEIGHT: 164 LBS | DIASTOLIC BLOOD PRESSURE: 90 MMHG

## 2018-10-17 DIAGNOSIS — J44.9 MODERATE COPD (CHRONIC OBSTRUCTIVE PULMONARY DISEASE) (HCC): Primary | ICD-10-CM

## 2018-10-17 DIAGNOSIS — Z72.0 TOBACCO ABUSE: ICD-10-CM

## 2018-10-17 PROCEDURE — G8484 FLU IMMUNIZE NO ADMIN: HCPCS | Performed by: THORACIC SURGERY (CARDIOTHORACIC VASCULAR SURGERY)

## 2018-10-17 PROCEDURE — 1090F PRES/ABSN URINE INCON ASSESS: CPT | Performed by: THORACIC SURGERY (CARDIOTHORACIC VASCULAR SURGERY)

## 2018-10-17 PROCEDURE — 3017F COLORECTAL CA SCREEN DOC REV: CPT | Performed by: THORACIC SURGERY (CARDIOTHORACIC VASCULAR SURGERY)

## 2018-10-17 PROCEDURE — G8399 PT W/DXA RESULTS DOCUMENT: HCPCS | Performed by: THORACIC SURGERY (CARDIOTHORACIC VASCULAR SURGERY)

## 2018-10-17 PROCEDURE — 1123F ACP DISCUSS/DSCN MKR DOCD: CPT | Performed by: THORACIC SURGERY (CARDIOTHORACIC VASCULAR SURGERY)

## 2018-10-17 PROCEDURE — 4004F PT TOBACCO SCREEN RCVD TLK: CPT | Performed by: THORACIC SURGERY (CARDIOTHORACIC VASCULAR SURGERY)

## 2018-10-17 PROCEDURE — 1101F PT FALLS ASSESS-DOCD LE1/YR: CPT | Performed by: THORACIC SURGERY (CARDIOTHORACIC VASCULAR SURGERY)

## 2018-10-17 PROCEDURE — 99203 OFFICE O/P NEW LOW 30 MIN: CPT | Performed by: THORACIC SURGERY (CARDIOTHORACIC VASCULAR SURGERY)

## 2018-10-17 PROCEDURE — G8427 DOCREV CUR MEDS BY ELIG CLIN: HCPCS | Performed by: THORACIC SURGERY (CARDIOTHORACIC VASCULAR SURGERY)

## 2018-10-17 PROCEDURE — 3023F SPIROM DOC REV: CPT | Performed by: THORACIC SURGERY (CARDIOTHORACIC VASCULAR SURGERY)

## 2018-10-17 PROCEDURE — 4040F PNEUMOC VAC/ADMIN/RCVD: CPT | Performed by: THORACIC SURGERY (CARDIOTHORACIC VASCULAR SURGERY)

## 2018-10-17 PROCEDURE — G8598 ASA/ANTIPLAT THER USED: HCPCS | Performed by: THORACIC SURGERY (CARDIOTHORACIC VASCULAR SURGERY)

## 2018-10-17 PROCEDURE — G8417 CALC BMI ABV UP PARAM F/U: HCPCS | Performed by: THORACIC SURGERY (CARDIOTHORACIC VASCULAR SURGERY)

## 2018-10-17 PROCEDURE — G8926 SPIRO NO PERF OR DOC: HCPCS | Performed by: THORACIC SURGERY (CARDIOTHORACIC VASCULAR SURGERY)

## 2018-10-17 RX ORDER — CILOSTAZOL 50 MG/1
100 TABLET ORAL 2 TIMES DAILY
COMMUNITY
End: 2019-10-15

## 2018-10-19 ENCOUNTER — TELEPHONE (OUTPATIENT)
Dept: CARDIOTHORACIC SURGERY | Age: 73
End: 2018-10-19

## 2018-10-19 DIAGNOSIS — C34.12 MALIGNANT NEOPLASM OF UPPER LOBE OF LEFT LUNG (HCC): Primary | ICD-10-CM

## 2018-10-19 RX ORDER — NICOTINE 21 MG/24HR
1 PATCH, TRANSDERMAL 24 HOURS TRANSDERMAL EVERY 24 HOURS
Qty: 30 PATCH | Refills: 0 | Status: SHIPPED | OUTPATIENT
Start: 2018-10-19 | End: 2018-11-28

## 2018-10-19 RX ORDER — AMIODARONE HYDROCHLORIDE 200 MG/1
400 TABLET ORAL 2 TIMES DAILY
Qty: 8 TABLET | Refills: 0 | Status: SHIPPED | OUTPATIENT
Start: 2018-10-19 | End: 2018-11-28 | Stop reason: ALTCHOICE

## 2018-10-22 ENCOUNTER — TELEPHONE (OUTPATIENT)
Dept: VASCULAR SURGERY | Age: 73
End: 2018-10-22

## 2018-10-22 DIAGNOSIS — I71.20 THORACIC AORTIC ANEURYSM WITHOUT RUPTURE: Primary | ICD-10-CM

## 2018-10-23 ENCOUNTER — OFFICE VISIT (OUTPATIENT)
Dept: CARDIOLOGY CLINIC | Age: 73
End: 2018-10-23
Payer: MEDICARE

## 2018-10-23 VITALS
BODY MASS INDEX: 27.49 KG/M2 | WEIGHT: 165 LBS | DIASTOLIC BLOOD PRESSURE: 90 MMHG | HEIGHT: 65 IN | SYSTOLIC BLOOD PRESSURE: 176 MMHG | HEART RATE: 57 BPM

## 2018-10-23 DIAGNOSIS — Z01.810 PREOP CARDIOVASCULAR EXAM: ICD-10-CM

## 2018-10-23 DIAGNOSIS — Z01.818 PRE-OP EXAM: Primary | ICD-10-CM

## 2018-10-23 PROCEDURE — 4040F PNEUMOC VAC/ADMIN/RCVD: CPT | Performed by: INTERNAL MEDICINE

## 2018-10-23 PROCEDURE — 1090F PRES/ABSN URINE INCON ASSESS: CPT | Performed by: INTERNAL MEDICINE

## 2018-10-23 PROCEDURE — 1101F PT FALLS ASSESS-DOCD LE1/YR: CPT | Performed by: INTERNAL MEDICINE

## 2018-10-23 PROCEDURE — G8417 CALC BMI ABV UP PARAM F/U: HCPCS | Performed by: INTERNAL MEDICINE

## 2018-10-23 PROCEDURE — 93000 ELECTROCARDIOGRAM COMPLETE: CPT | Performed by: INTERNAL MEDICINE

## 2018-10-23 PROCEDURE — G8427 DOCREV CUR MEDS BY ELIG CLIN: HCPCS | Performed by: INTERNAL MEDICINE

## 2018-10-23 PROCEDURE — 3017F COLORECTAL CA SCREEN DOC REV: CPT | Performed by: INTERNAL MEDICINE

## 2018-10-23 PROCEDURE — G8598 ASA/ANTIPLAT THER USED: HCPCS | Performed by: INTERNAL MEDICINE

## 2018-10-23 PROCEDURE — G8484 FLU IMMUNIZE NO ADMIN: HCPCS | Performed by: INTERNAL MEDICINE

## 2018-10-23 PROCEDURE — 4004F PT TOBACCO SCREEN RCVD TLK: CPT | Performed by: INTERNAL MEDICINE

## 2018-10-23 PROCEDURE — 99204 OFFICE O/P NEW MOD 45 MIN: CPT | Performed by: INTERNAL MEDICINE

## 2018-10-23 PROCEDURE — G8399 PT W/DXA RESULTS DOCUMENT: HCPCS | Performed by: INTERNAL MEDICINE

## 2018-10-23 PROCEDURE — 1123F ACP DISCUSS/DSCN MKR DOCD: CPT | Performed by: INTERNAL MEDICINE

## 2018-10-23 ASSESSMENT — ENCOUNTER SYMPTOMS
ABDOMINAL DISTENTION: 0
SHORTNESS OF BREATH: 1
EYE DISCHARGE: 0
BACK PAIN: 0
BLOOD IN STOOL: 0
COUGH: 0
WHEEZING: 0
COLOR CHANGE: 0
CHEST TIGHTNESS: 0
VOMITING: 0
FACIAL SWELLING: 0
ABDOMINAL PAIN: 0

## 2018-10-23 NOTE — LETTER
[facility]  [address]  [address]  [address] [address]  Phone: [phone]  Fax: [phone]    Adam Wu MD        10/23/2018      Jean-Pierre Stevens YOB: 1945 has an acceptable risk for a non cardiac surgery. No further cardiac work up needed at this time. If there are any questions, please feel free to contact my office at [phone].       Sincerely,          Adam Wu MD

## 2018-10-26 ENCOUNTER — HOSPITAL ENCOUNTER (OUTPATIENT)
Dept: GENERAL RADIOLOGY | Age: 73
Discharge: HOME | End: 2018-10-26
Payer: MEDICARE

## 2018-10-26 ENCOUNTER — ANESTHESIA EVENT (OUTPATIENT)
Dept: OPERATING ROOM | Age: 73
DRG: 164 | End: 2018-10-26
Payer: MEDICARE

## 2018-10-26 ENCOUNTER — HOSPITAL ENCOUNTER (OUTPATIENT)
Age: 73
Discharge: HOME | End: 2018-10-26
Payer: MEDICARE

## 2018-10-26 ENCOUNTER — HOSPITAL ENCOUNTER (OUTPATIENT)
Dept: PREADMISSION TESTING | Age: 73
Discharge: HOME | End: 2018-10-30
Payer: MEDICARE

## 2018-10-26 VITALS
HEART RATE: 62 BPM | SYSTOLIC BLOOD PRESSURE: 113 MMHG | TEMPERATURE: 99 F | HEIGHT: 65 IN | BODY MASS INDEX: 27.49 KG/M2 | WEIGHT: 165 LBS | DIASTOLIC BLOOD PRESSURE: 62 MMHG | OXYGEN SATURATION: 94 %

## 2018-10-26 LAB
ABO/RH: NORMAL
ALBUMIN SERPL-MCNC: 3.8 G/DL (ref 3.4–5)
ALP BLD-CCNC: 82 U/L (ref 40–129)
ALT SERPL-CCNC: 12 U/L (ref 10–40)
ANION GAP SERPL CALCULATED.3IONS-SCNC: 13 MMOL/L (ref 3–16)
ANTIBODY SCREEN: NORMAL
APTT: 31.2 SEC (ref 26–36)
AST SERPL-CCNC: 14 U/L (ref 15–37)
BASOPHILS ABSOLUTE: 0.1 K/UL (ref 0–0.2)
BASOPHILS RELATIVE PERCENT: 0.7 %
BILIRUB SERPL-MCNC: 0.3 MG/DL (ref 0–1)
BILIRUBIN DIRECT: <0.2 MG/DL (ref 0–0.3)
BILIRUBIN URINE: NEGATIVE
BILIRUBIN, INDIRECT: ABNORMAL MG/DL (ref 0–1)
BLOOD, URINE: NEGATIVE
BUN BLDV-MCNC: 10 MG/DL (ref 7–20)
CALCIUM SERPL-MCNC: 9 MG/DL (ref 8.3–10.6)
CHLORIDE BLD-SCNC: 103 MMOL/L (ref 99–110)
CLARITY: CLEAR
CO2: 23 MMOL/L (ref 21–32)
COLOR: YELLOW
CREAT SERPL-MCNC: 0.8 MG/DL (ref 0.6–1.2)
EOSINOPHILS ABSOLUTE: 0.3 K/UL (ref 0–0.6)
EOSINOPHILS RELATIVE PERCENT: 2.8 %
GFR AFRICAN AMERICAN: >60
GFR NON-AFRICAN AMERICAN: >60
GLUCOSE BLD-MCNC: 101 MG/DL (ref 70–99)
GLUCOSE URINE: NEGATIVE MG/DL
HCT VFR BLD CALC: 43.1 % (ref 36–48)
HEMOGLOBIN: 13.9 G/DL (ref 12–16)
INR BLD: 0.97 (ref 0.86–1.14)
KETONES, URINE: NEGATIVE MG/DL
LEUKOCYTE ESTERASE, URINE: NEGATIVE
LYMPHOCYTES ABSOLUTE: 1.5 K/UL (ref 1–5.1)
LYMPHOCYTES RELATIVE PERCENT: 15.9 %
MCH RBC QN AUTO: 31.1 PG (ref 26–34)
MCHC RBC AUTO-ENTMCNC: 32.2 G/DL (ref 31–36)
MCV RBC AUTO: 96.5 FL (ref 80–100)
MICROSCOPIC EXAMINATION: NORMAL
MONOCYTES ABSOLUTE: 0.5 K/UL (ref 0–1.3)
MONOCYTES RELATIVE PERCENT: 5.6 %
NEUTROPHILS ABSOLUTE: 6.9 K/UL (ref 1.7–7.7)
NEUTROPHILS RELATIVE PERCENT: 75 %
NITRITE, URINE: NEGATIVE
PDW BLD-RTO: 13.6 % (ref 12.4–15.4)
PH UA: 6
PLATELET # BLD: 161 K/UL (ref 135–450)
PMV BLD AUTO: 10.5 FL (ref 5–10.5)
POTASSIUM SERPL-SCNC: 4.1 MMOL/L (ref 3.5–5.1)
PROTEIN UA: NEGATIVE MG/DL
PROTHROMBIN TIME: 11.1 SEC (ref 9.8–13)
RBC # BLD: 4.46 M/UL (ref 4–5.2)
SODIUM BLD-SCNC: 139 MMOL/L (ref 136–145)
SPECIFIC GRAVITY UA: 1.02
TOTAL PROTEIN: 6.4 G/DL (ref 6.4–8.2)
URINE REFLEX TO CULTURE: NORMAL
URINE TYPE: NORMAL
UROBILINOGEN, URINE: 1 E.U./DL
WBC # BLD: 9.2 K/UL (ref 4–11)

## 2018-10-26 PROCEDURE — 86850 RBC ANTIBODY SCREEN: CPT

## 2018-10-26 PROCEDURE — 85730 THROMBOPLASTIN TIME PARTIAL: CPT

## 2018-10-26 PROCEDURE — 81003 URINALYSIS AUTO W/O SCOPE: CPT

## 2018-10-26 PROCEDURE — 71046 X-RAY EXAM CHEST 2 VIEWS: CPT

## 2018-10-26 PROCEDURE — 86900 BLOOD TYPING SEROLOGIC ABO: CPT

## 2018-10-26 PROCEDURE — 85025 COMPLETE CBC W/AUTO DIFF WBC: CPT

## 2018-10-26 PROCEDURE — 86901 BLOOD TYPING SEROLOGIC RH(D): CPT

## 2018-10-26 PROCEDURE — 85610 PROTHROMBIN TIME: CPT

## 2018-10-26 PROCEDURE — 80076 HEPATIC FUNCTION PANEL: CPT

## 2018-10-26 PROCEDURE — 80048 BASIC METABOLIC PNL TOTAL CA: CPT

## 2018-10-26 PROCEDURE — 36415 COLL VENOUS BLD VENIPUNCTURE: CPT

## 2018-10-26 ASSESSMENT — COPD QUESTIONNAIRES: CAT_SEVERITY: MODERATE

## 2018-10-26 NOTE — ANESTHESIA PRE PROCEDURE
Department of Anesthesiology  Preprocedure Note       Name:  Casey Rao   Age:  68 y.o.  :  1945                                          MRN:  [id number]         Date:  10/26/2018      Surgeon: Sosa Sy):  Mateo Fitzgerald MD    Procedure: LEFT VIDEO ASSISTED THORACOSCOPY, WEDGE RESECTION, MEDIASTINAL LYMPH NODE DISSECTION (Left )    Medications prior to admission:   Prior to Admission medications    Medication Sig Start Date End Date Taking? Authorizing Provider   nicotine (NICODERM CQ) 21 MG/24HR Place 1 patch onto the skin every 24 hours 10/19/18   Mini Freedman MD   amiodarone (CORDARONE) 200 MG tablet Take 2 tablets by mouth 2 times daily for 2 days Start AM on 10/31/18. Last dose PM on 11/1/18 10/19/18 10/21/18  Mateo Fitzgerald MD   cilostazol (PLETAL) 50 MG tablet Take 100 mg by mouth 2 times daily    Historical Provider, MD   amLODIPine (NORVASC) 5 MG tablet Take 5 mg by mouth daily    Historical Provider, MD   levalbuterol (XOPENEX) 0.63 MG/3ML nebulization Take 3 mLs by nebulization every 4 hours as needed for Wheezing Dx: COPD   ICD-10: J44.9 18   Madisyn Asencio MD   INCRUSE ELLIPTA 62.5 MCG/INH AEPB USE 1 INHALATION DAILY AS DIRECTED 8/15/18   Madisyn Asencio MD   ADVAIR -21 MCG/ACT inhaler INHALE 2 PUFFS BY MOUTH TWICE A DAY 18   Madisyn Asencio MD   tiotropium (SPIRIVA RESPIMAT) 2.5 MCG/ACT AERS inhaler Inhale 2 puffs into the lungs daily 17   Madisyn Asencio MD   exemestane (AROMASIN) 25 MG chemo tablet TAKE 1 TABLET BY MOUTH ONCE A DAY FOR BREAST CANCER 17   Melina Foreman MD   clopidogrel (PLAVIX) 75 MG tablet Take 75 mg by mouth daily    Historical Provider, MD   OXYGEN Inhale into the lungs At night prn    Historical Provider, MD   FLUoxetine (PROZAC) 20 MG capsule Take 3 capsules by mouth daily 6/10/15   SUSIE Wasserman - CNP   HYDROcodone-acetaminophen (NORCO)  MG per tablet Take 1 tablet by mouth every 6 hours as needed for Pain.  2/16/15   Sixto Ramon MD  OTHER SURGICAL HISTORY Left 2/16/15    excision of left vulva lesion    VASCULAR SURGERY      stent each groin    VULVECTOMY      History of radical vulvectomy with a left inguinal lymph node dissection November 2008. Social History:    Social History   Substance Use Topics    Smoking status: Current Every Day Smoker     Packs/day: 2.00     Years: 57.00     Types: Cigarettes    Smokeless tobacco: Never Used      Comment: started to smoke at 16 / 1/2 ppd as of 10/17/18.  Alcohol use No                                Ready to quit: Not Answered  Counseling given: Not Answered      Vital Signs (Current): There were no vitals filed for this visit. BP Readings from Last 3 Encounters:   10/23/18 (!) 176/90   10/17/18 (!) 152/90   10/04/18 136/70       NPO Status:                                                                                 BMI:   Wt Readings from Last 3 Encounters:   10/23/18 165 lb (74.8 kg)   10/17/18 164 lb (74.4 kg)   10/04/18 164 lb (74.4 kg)     There is no height or weight on file to calculate BMI.    CBC:   Lab Results   Component Value Date    WBC 11.4 07/09/2018    RBC 4.66 07/09/2018    RBC 4.43 01/27/2017    HGB 13.9 07/09/2018    HCT 42.6 07/09/2018    MCV 91.4 07/09/2018    RDW 14.5 07/09/2018     07/09/2018       CMP:   Lab Results   Component Value Date     07/09/2018    K 3.4 07/09/2018    K 4.1 02/05/2018     07/09/2018    CO2 25 07/09/2018    BUN 11 07/09/2018    CREATININE 0.8 07/09/2018    GFRAA >60 07/09/2018    GFRAA >60 06/30/2012    AGRATIO 1.6 07/09/2018    LABGLOM >60 07/09/2018    GLUCOSE 97 07/09/2018    PROT 6.7 07/09/2018    PROT 6.8 05/12/2011    CALCIUM 9.5 07/09/2018    BILITOT 0.3 07/09/2018    ALKPHOS 77 07/09/2018    AST 13 07/09/2018    ALT 11 07/09/2018       POC Tests: No results for input(s): POCGLU, POCNA, POCK, POCCL, POCBUN, POCHEMO, POCHCT in the last 72 hours.     Coags:

## 2018-10-29 NOTE — PROGRESS NOTES
Consultation H&P        Date of Admission:  No admission date for patient encounter. Date of Consultation:  10/29/2018    PCP:  Germaine Restrepo MD      Chief Complaint:     History of Present Illness: We are asked to see this patient in consultation by Dr. ramirez  Rosalina Acosta is a 68 y.o. female who      Past Medical History:  Past Medical History:   Diagnosis Date    Aortic aneurysm ([facility]     monitored by Dr. Chio Neff Buerger's disease [facility])     Cancer of vulva ([facility] 2008    microscopic invasive squamous carcinoma vulva    Cataract     COPD (chronic obstructive pulmonary disease) ([facility] [address] [facility]     Emphysema (subcutaneous) (surgical) resulting from a procedure     History of radiation therapy      Completed external beam radiation therapy 04/23/14 total 5000 cGy to the right breast.     Hypertension     [facility]ae ([facility]     monitored by Dr. Niki Shaw vascular dis        Past Surgical History:  Past Surgical History:   Procedure Laterality Date    BREAST LUMPECTOMY Right 2/6/14    BREAST SURGERY      tumors removed    BRONCHOSCOPY  01/25/2018    BAL    EYE SURGERY      lasik    JOINT REPLACEMENT Bilateral     right and left KNEE    KNEE ARTHROSCOPY      KNEE SURGERY  9/1/10    REMOVAL OF RETAINED ANTIBIOTIC SPACERS,LEFT KNEE DEBRIDEMENT ANSD SYNOVECTOMY WITH FROZEN SECTION. LEFT KNEE PLACEMENT OF ANTIBIOTIC SPACER LEFT KNEE    OTHER SURGICAL HISTORY  3-2010    subclavian stent left    OTHER SURGICAL HISTORY      stents bilateral femoral arteries    OTHER SURGICAL HISTORY Left 2/16/15    excision of left vulva lesion    VASCULAR SURGERY      stent each groin    VULVECTOMY      History of radical vulvectomy with a left inguinal lymph node dissection November 2008. Home Medications:   Prior to Admission medications    Medication Sig Start Date End Date Taking?  Authorizing Provider   cilostazol (PLETAL) 50 MG tablet Take 100 mg by mouth 2 times daily Yes Historical Provider, MD   amLODIPine (NORVASC) 5 MG tablet Take 5 mg by mouth daily   Yes Historical Provider, MD   levalbuterol (XOPENEX) 0.63 MG/3ML nebulization Take 3 mLs by nebulization every 4 hours as needed for Wheezing Dx: COPD   ICD-10: J44.9 9/26/18  Yes Shila Gomez MD   INCRUSE ELLIPTA 62.5 MCG/INH AEPB USE 1 INHALATION DAILY AS DIRECTED 8/15/18  Yes Shila Gomez MD   ADVAIR -21 MCG/ACT inhaler INHALE 2 PUFFS BY MOUTH TWICE A DAY 7/18/18  Yes Shila Gomez MD   tiotropium (SPIRIVA RESPIMAT) 2.5 MCG/ACT AERS inhaler Inhale 2 puffs into the lungs daily 12/8/17  Yes Shila Gomez MD   exemestane (AROMASIN) 25 MG chemo tablet TAKE 1 TABLET BY MOUTH ONCE A DAY FOR BREAST CANCER 5/12/17  Yes Kristie Alvarez MD   clopidogrel (PLAVIX) 75 MG tablet Take 75 mg by mouth daily   Yes Historical Provider, MD   OXYGEN Inhale into the lungs At night prn   Yes Historical Provider, MD   FLUoxetine (PROZAC) 20 MG capsule Take 3 capsules by mouth daily 6/10/15  Yes SUSIE To - CNP   HYDROcodone-acetaminophen (NORCO)  MG per tablet Take 1 tablet by mouth every 6 hours as needed for Pain. 2/16/15  Yes Bard Nichol MD   bisacodyl (DULCOLAX) 5 MG EC tablet Take 5 mg by mouth nightly as needed for Constipation. Yes Historical Provider, MD   levothyroxine (SYNTHROID) 50 MCG tablet Take 50 mcg by mouth Daily. Yes Historical Provider, MD   albuterol (PROVENTIL HFA;VENTOLIN HFA) 108 (90 BASE) MCG/ACT inhaler Inhale 2 puffs into the lungs every 6 hours as needed for Wheezing. Yes Historical Provider, MD   guaifenesin (MUCINEX) 600 MG SR tablet Take 800 mg by mouth 3 times daily. Yes Historical Provider, MD   potassium chloride SA (K-DUR;KLOR-CON) 20 MEQ tablet Take 20 mEq by mouth daily    Yes Historical Provider, MD   pregabalin (LYRICA) 150 MG capsule Take 150 mg by mouth 2 times daily. Yes Historical Provider, MD   rosuvastatin (CRESTOR) 10 MG tablet Take 20 mg by mouth daily.    Yes Historical Provider, MD   lisinopril (PRINIVIL;ZESTRIL) 10 MG tablet Take 20 mg by mouth daily    Yes Historical Provider, MD   lorazepam (ATIVAN) 1 MG tablet Take 3 mg by mouth every evening. 3/8/10  Yes Historical Provider, MD   fluticasone (FLONASE) 50 MCG/ACT nasal spray 2 sprays by Nasal route daily. Yes Historical Provider, MD   nicotine (NICODERM CQ) 21 MG/24HR Place 1 patch onto the skin every 24 hours 10/19/18   Mini Rogers MD   amiodarone (CORDARONE) 200 MG tablet Take 2 tablets by mouth 2 times daily for 2 days Start AM on 10/31/18. Last dose PM on 11/1/18 10/19/18 10/21/18  Anna Marie Luke MD        Facility Administered Medications: Allergies: Allergies   Allergen Reactions    Codeine Hives    Penicillins Hives        Social History:    Working:   Caffeine:   Lifestyle:    Social History     Social History    Marital status:      Spouse name: N/A    Number of children: N/A    Years of education: N/A     Occupational History    Not on file. Social History Main Topics    Smoking status: Current Every Day Smoker     Packs/day: 2.00     Years: 57.00     Types: Cigarettes    Smokeless tobacco: Never Used      Comment: started to smoke at 16 / 1/2 ppd as of 10/17/18.  Alcohol use No    Drug use: Yes      Comment: marijuana    Sexual activity: Not on file     Other Topics Concern    Not on file     Social History Narrative    No narrative on file       Family History:  Heart Disease:   Stroke:   Cancer:   Diabetes:   Hypertension:   Aneurysm/PVD:     Family History   Problem Relation Age of Onset    Cancer Mother         cervical    Stroke Sister     Heart Disease Brother     Breast Cancer Maternal Aunt         x2       Review of Systems:  Constitutional:  No night sweats, headaches, weight loss. Eyes:  No glaucoma, cataracts. ENMT:  No nosebleeds, deviated septum. Cardiac:  No arrhythmias, previous MI. Vascular:  No claudication, varicosities. GI:  No PUD, heartburn.   :

## 2018-10-31 RX ORDER — FLUTICASONE PROPIONATE AND SALMETEROL XINAFOATE 230; 21 UG/1; UG/1
AEROSOL, METERED RESPIRATORY (INHALATION)
Qty: 1 INHALER | Refills: 0 | Status: SHIPPED | OUTPATIENT
Start: 2018-10-31 | End: 2019-12-18 | Stop reason: SDUPTHER

## 2018-11-01 ENCOUNTER — OFFICE VISIT (OUTPATIENT)
Dept: PULMONOLOGY | Age: 73
End: 2018-11-01
Payer: MEDICARE

## 2018-11-01 VITALS
SYSTOLIC BLOOD PRESSURE: 127 MMHG | OXYGEN SATURATION: 92 % | BODY MASS INDEX: 27.49 KG/M2 | HEART RATE: 65 BPM | RESPIRATION RATE: 18 BRPM | WEIGHT: 165 LBS | DIASTOLIC BLOOD PRESSURE: 77 MMHG | HEIGHT: 65 IN

## 2018-11-01 DIAGNOSIS — C34.92 SQUAMOUS CELL CARCINOMA OF LEFT LUNG (HCC): Primary | ICD-10-CM

## 2018-11-01 DIAGNOSIS — Z01.811 PRE-OPERATIVE RESPIRATORY EXAMINATION: ICD-10-CM

## 2018-11-01 DIAGNOSIS — J44.9 MODERATE COPD (CHRONIC OBSTRUCTIVE PULMONARY DISEASE) (HCC): ICD-10-CM

## 2018-11-01 DIAGNOSIS — Z72.0 TOBACCO ABUSE: ICD-10-CM

## 2018-11-01 PROCEDURE — G8926 SPIRO NO PERF OR DOC: HCPCS | Performed by: INTERNAL MEDICINE

## 2018-11-01 PROCEDURE — G8427 DOCREV CUR MEDS BY ELIG CLIN: HCPCS | Performed by: INTERNAL MEDICINE

## 2018-11-01 PROCEDURE — G8399 PT W/DXA RESULTS DOCUMENT: HCPCS | Performed by: INTERNAL MEDICINE

## 2018-11-01 PROCEDURE — G8482 FLU IMMUNIZE ORDER/ADMIN: HCPCS | Performed by: INTERNAL MEDICINE

## 2018-11-01 PROCEDURE — 3023F SPIROM DOC REV: CPT | Performed by: INTERNAL MEDICINE

## 2018-11-01 PROCEDURE — G8598 ASA/ANTIPLAT THER USED: HCPCS | Performed by: INTERNAL MEDICINE

## 2018-11-01 PROCEDURE — 99214 OFFICE O/P EST MOD 30 MIN: CPT | Performed by: INTERNAL MEDICINE

## 2018-11-01 PROCEDURE — 1101F PT FALLS ASSESS-DOCD LE1/YR: CPT | Performed by: INTERNAL MEDICINE

## 2018-11-01 PROCEDURE — 3017F COLORECTAL CA SCREEN DOC REV: CPT | Performed by: INTERNAL MEDICINE

## 2018-11-01 PROCEDURE — 4004F PT TOBACCO SCREEN RCVD TLK: CPT | Performed by: INTERNAL MEDICINE

## 2018-11-01 PROCEDURE — 1123F ACP DISCUSS/DSCN MKR DOCD: CPT | Performed by: INTERNAL MEDICINE

## 2018-11-01 PROCEDURE — 1090F PRES/ABSN URINE INCON ASSESS: CPT | Performed by: INTERNAL MEDICINE

## 2018-11-01 PROCEDURE — G8417 CALC BMI ABV UP PARAM F/U: HCPCS | Performed by: INTERNAL MEDICINE

## 2018-11-01 PROCEDURE — 4040F PNEUMOC VAC/ADMIN/RCVD: CPT | Performed by: INTERNAL MEDICINE

## 2018-11-01 NOTE — PROGRESS NOTES
Negative. Negative for hallucinations, behavioral problems, confusion and agitation. The patient is not nervous/anxious and is not hyperactive. Blood pressure (!) 140/79, pulse 79, resp. rate 18, height 5' 5\" (1.651 m), weight 174 lb (78.9 kg), last menstrual period 03/11/1991, SpO2 94 %, not currently breastfeeding. Physical Exam   Constitutional: Oriented. Well-developed and well-nourished. No distress. HENT:   Head: Normocephalic and atraumatic. Mouth/Throat: Oropharynx is clear and moist. No oropharyngeal exudate. Eyes: Conjunctivae and extraocular motions are normal. Pupils are equal, round, and reactive to light. Right eye exhibits no discharge. Left eye exhibits no discharge. Neck: Normal range of motion. Neck supple. No JVD present. Carotid bruit is not present. No rigidity. No tracheal deviation present. No thyromegaly present. Cardiovascular: Normal rate, regular rhythm, S1&S2 and intact distal pulses. Pulmonary/Chest: Diminished breath sounds. Has no wheezes. Has no rhonchi. Has no rales. Exhibits no tenderness. Abdominal: Soft. Bowel sounds are normal. Exhibits no shifting dullness, no distension and no mass. No tenderness. Has no rebound and no guarding. Musculoskeletal: Normal range of motion. Exhibits no edema and no tenderness. Lymphadenopathy:     Has no cervical adenopathy. Has no axillary adenopathy. Neurological: Alert and oriented. Has normal reflexes. No cranial nerve deficit. Exhibits normal muscle tone. Coordination normal.   Skin: Skin is warm and dry. No rash noted. No erythema. Psychiatric: Has a normal mood and affect. Behavior is normal. Thought content normal.      Assessment:     Diagnosis Orders   1. Squamous cell carcinoma of left lung ([address]     2. Pre-operative respiratory examination     3. Moderate COPD (chronic obstructive pulmonary disease) ([address]     4.  Tobacco abuse                 Plan:    · Discussed with patient and her daughter the

## 2018-11-02 ENCOUNTER — APPOINTMENT (OUTPATIENT)
Dept: GENERAL RADIOLOGY | Age: 73
DRG: 164 | End: 2018-11-02
Attending: THORACIC SURGERY (CARDIOTHORACIC VASCULAR SURGERY)
Payer: MEDICARE

## 2018-11-02 ENCOUNTER — HOSPITAL ENCOUNTER (INPATIENT)
Age: 73
LOS: 4 days | Discharge: HOME | DRG: 164 | End: 2018-11-06
Attending: THORACIC SURGERY (CARDIOTHORACIC VASCULAR SURGERY) | Admitting: THORACIC SURGERY (CARDIOTHORACIC VASCULAR SURGERY)
Payer: MEDICARE

## 2018-11-02 ENCOUNTER — ANESTHESIA (OUTPATIENT)
Dept: OPERATING ROOM | Age: 73
DRG: 164 | End: 2018-11-02
Payer: MEDICARE

## 2018-11-02 VITALS — OXYGEN SATURATION: 98 % | RESPIRATION RATE: 4 BRPM

## 2018-11-02 DIAGNOSIS — Z98.890 STATUS POST LUNG SURGERY: Primary | ICD-10-CM

## 2018-11-02 PROBLEM — C34.12 PRIMARY CANCER OF LEFT UPPER LOBE OF LUNG (HCC): Status: ACTIVE | Noted: 2018-11-02

## 2018-11-02 LAB
ABO/RH: NORMAL
ANTIBODY SCREEN: NORMAL
BASE EXCESS ARTERIAL: -0.7 MMOL/L (ref -3–3)
CARBOXYHEMOGLOBIN ARTERIAL: 3.7 % (ref 0–1.5)
HCO3 ARTERIAL: 24.4 MMOL/L (ref 21–29)
HEMOGLOBIN, ART, EXTENDED: 14 G/DL (ref 12–16)
METHEMOGLOBIN ARTERIAL: 0.1 %
O2 CONTENT ARTERIAL: 18 ML/DL
O2 SAT, ARTERIAL: 93.8 %
O2 THERAPY: ABNORMAL
PCO2 ARTERIAL: 40.7 MMHG (ref 35–45)
PH ARTERIAL: 7.39 (ref 7.35–7.45)
PO2 ARTERIAL: 65.4 MMHG (ref 75–108)
TCO2 ARTERIAL: 57.4 MMOL/L

## 2018-11-02 PROCEDURE — 86900 BLOOD TYPING SEROLOGIC ABO: CPT

## 2018-11-02 PROCEDURE — 6370000000 HC RX 637 (ALT 250 FOR IP): Performed by: THORACIC SURGERY (CARDIOTHORACIC VASCULAR SURGERY)

## 2018-11-02 PROCEDURE — 2580000003 HC RX 258: Performed by: THORACIC SURGERY (CARDIOTHORACIC VASCULAR SURGERY)

## 2018-11-02 PROCEDURE — 94640 AIRWAY INHALATION TREATMENT: CPT

## 2018-11-02 PROCEDURE — C9290 INJ, BUPIVACAINE LIPOSOME: HCPCS

## 2018-11-02 PROCEDURE — 0BJ08ZZ INSPECTION OF TRACHEOBRONCHIAL TREE, VIA NATURAL OR ARTIFICIAL OPENING ENDOSCOPIC: ICD-10-PCS | Performed by: THORACIC SURGERY (CARDIOTHORACIC VASCULAR SURGERY)

## 2018-11-02 PROCEDURE — 3700000000 HC ANESTHESIA ATTENDED CARE: Performed by: THORACIC SURGERY (CARDIOTHORACIC VASCULAR SURGERY)

## 2018-11-02 PROCEDURE — 71045 X-RAY EXAM CHEST 1 VIEW: CPT

## 2018-11-02 PROCEDURE — 2500000003 HC RX 250 WO HCPCS: Performed by: THORACIC SURGERY (CARDIOTHORACIC VASCULAR SURGERY)

## 2018-11-02 PROCEDURE — 3700000001 HC ADD 15 MINUTES (ANESTHESIA): Performed by: THORACIC SURGERY (CARDIOTHORACIC VASCULAR SURGERY)

## 2018-11-02 PROCEDURE — P9016 RBC LEUKOCYTES REDUCED: HCPCS

## 2018-11-02 PROCEDURE — 6370000000 HC RX 637 (ALT 250 FOR IP): Performed by: ANESTHESIOLOGY

## 2018-11-02 PROCEDURE — 03HY32Z INSERTION OF MONITORING DEVICE INTO UPPER ARTERY, PERCUTANEOUS APPROACH: ICD-10-PCS | Performed by: THORACIC SURGERY (CARDIOTHORACIC VASCULAR SURGERY)

## 2018-11-02 PROCEDURE — 2100000000 HC CCU R&B

## 2018-11-02 PROCEDURE — 3E0T3BZ INTRODUCTION OF ANESTHETIC AGENT INTO PERIPHERAL NERVES AND PLEXI, PERCUTANEOUS APPROACH: ICD-10-PCS | Performed by: THORACIC SURGERY (CARDIOTHORACIC VASCULAR SURGERY)

## 2018-11-02 PROCEDURE — 6360000002 HC RX W HCPCS: Performed by: THORACIC SURGERY (CARDIOTHORACIC VASCULAR SURGERY)

## 2018-11-02 PROCEDURE — 82803 BLOOD GASES ANY COMBINATION: CPT

## 2018-11-02 PROCEDURE — C1729 CATH, DRAINAGE: HCPCS | Performed by: THORACIC SURGERY (CARDIOTHORACIC VASCULAR SURGERY)

## 2018-11-02 PROCEDURE — C1773 RET DEV, INSERTABLE: HCPCS | Performed by: THORACIC SURGERY (CARDIOTHORACIC VASCULAR SURGERY)

## 2018-11-02 PROCEDURE — 6360000002 HC RX W HCPCS: Performed by: ANESTHESIOLOGY

## 2018-11-02 PROCEDURE — 36415 COLL VENOUS BLD VENIPUNCTURE: CPT

## 2018-11-02 PROCEDURE — 88307 TISSUE EXAM BY PATHOLOGIST: CPT

## 2018-11-02 PROCEDURE — S0028 INJECTION, FAMOTIDINE, 20 MG: HCPCS | Performed by: ANESTHESIOLOGY

## 2018-11-02 PROCEDURE — 86850 RBC ANTIBODY SCREEN: CPT

## 2018-11-02 PROCEDURE — 2709999900 HC NON-CHARGEABLE SUPPLY: Performed by: THORACIC SURGERY (CARDIOTHORACIC VASCULAR SURGERY)

## 2018-11-02 PROCEDURE — 0BBG4ZZ EXCISION OF LEFT UPPER LUNG LOBE, PERCUTANEOUS ENDOSCOPIC APPROACH: ICD-10-PCS | Performed by: THORACIC SURGERY (CARDIOTHORACIC VASCULAR SURGERY)

## 2018-11-02 PROCEDURE — 36620 INSERTION CATHETER ARTERY: CPT | Performed by: ANESTHESIOLOGY

## 2018-11-02 PROCEDURE — 3600000014 HC SURGERY LEVEL 4 ADDTL 15MIN: Performed by: THORACIC SURGERY (CARDIOTHORACIC VASCULAR SURGERY)

## 2018-11-02 PROCEDURE — 6360000002 HC RX W HCPCS

## 2018-11-02 PROCEDURE — 86901 BLOOD TYPING SEROLOGIC RH(D): CPT

## 2018-11-02 PROCEDURE — C9290 INJ, BUPIVACAINE LIPOSOME: HCPCS | Performed by: THORACIC SURGERY (CARDIOTHORACIC VASCULAR SURGERY)

## 2018-11-02 PROCEDURE — 2720000010 HC SURG SUPPLY STERILE: Performed by: THORACIC SURGERY (CARDIOTHORACIC VASCULAR SURGERY)

## 2018-11-02 PROCEDURE — 2780000010 HC IMPLANT OTHER: Performed by: THORACIC SURGERY (CARDIOTHORACIC VASCULAR SURGERY)

## 2018-11-02 PROCEDURE — 86923 COMPATIBILITY TEST ELECTRIC: CPT

## 2018-11-02 PROCEDURE — 3600000004 HC SURGERY LEVEL 4 BASE: Performed by: THORACIC SURGERY (CARDIOTHORACIC VASCULAR SURGERY)

## 2018-11-02 PROCEDURE — 2500000003 HC RX 250 WO HCPCS: Performed by: ANESTHESIOLOGY

## 2018-11-02 RX ORDER — CLOPIDOGREL BISULFATE 75 MG/1
75 TABLET ORAL DAILY
Status: DISCONTINUED | OUTPATIENT
Start: 2018-11-02 | End: 2018-11-06 | Stop reason: HOSPADM

## 2018-11-02 RX ORDER — LIDOCAINE HYDROCHLORIDE 10 MG/ML
1 INJECTION, SOLUTION EPIDURAL; INFILTRATION; INTRACAUDAL; PERINEURAL
Status: COMPLETED | OUTPATIENT
Start: 2018-11-02 | End: 2018-11-02

## 2018-11-02 RX ORDER — MEPERIDINE HYDROCHLORIDE 25 MG/ML
12.5 INJECTION INTRAMUSCULAR; INTRAVENOUS; SUBCUTANEOUS EVERY 5 MIN PRN
Status: DISCONTINUED | OUTPATIENT
Start: 2018-11-02 | End: 2018-11-02 | Stop reason: HOSPADM

## 2018-11-02 RX ORDER — KETOROLAC TROMETHAMINE 30 MG/ML
30 INJECTION, SOLUTION INTRAMUSCULAR; INTRAVENOUS EVERY 6 HOURS
Status: COMPLETED | OUTPATIENT
Start: 2018-11-02 | End: 2018-11-04

## 2018-11-02 RX ORDER — LISINOPRIL 20 MG/1
20 TABLET ORAL DAILY
Status: DISCONTINUED | OUTPATIENT
Start: 2018-11-03 | End: 2018-11-06 | Stop reason: HOSPADM

## 2018-11-02 RX ORDER — AMLODIPINE BESYLATE 5 MG/1
5 TABLET ORAL DAILY
Status: DISCONTINUED | OUTPATIENT
Start: 2018-11-03 | End: 2018-11-06 | Stop reason: HOSPADM

## 2018-11-02 RX ORDER — GLYCOPYRROLATE 0.2 MG/ML
INJECTION INTRAMUSCULAR; INTRAVENOUS PRN
Status: DISCONTINUED | OUTPATIENT
Start: 2018-11-02 | End: 2018-11-02 | Stop reason: SDUPTHER

## 2018-11-02 RX ORDER — MAGNESIUM HYDROXIDE 1200 MG/15ML
LIQUID ORAL CONTINUOUS PRN
Status: COMPLETED | OUTPATIENT
Start: 2018-11-02 | End: 2018-11-02

## 2018-11-02 RX ORDER — OXYCODONE HYDROCHLORIDE 5 MG/1
5 TABLET ORAL EVERY 4 HOURS PRN
Status: DISCONTINUED | OUTPATIENT
Start: 2018-11-02 | End: 2018-11-05

## 2018-11-02 RX ORDER — PROMETHAZINE HYDROCHLORIDE 25 MG/ML
6.25 INJECTION, SOLUTION INTRAMUSCULAR; INTRAVENOUS EVERY 30 MIN PRN
Status: DISCONTINUED | OUTPATIENT
Start: 2018-11-02 | End: 2018-11-02 | Stop reason: HOSPADM

## 2018-11-02 RX ORDER — FENTANYL CITRATE 50 UG/ML
INJECTION, SOLUTION INTRAMUSCULAR; INTRAVENOUS PRN
Status: DISCONTINUED | OUTPATIENT
Start: 2018-11-02 | End: 2018-11-02 | Stop reason: SDUPTHER

## 2018-11-02 RX ORDER — GUAIFENESIN 600 MG/1
600 TABLET, EXTENDED RELEASE ORAL 3 TIMES DAILY
Status: DISCONTINUED | OUTPATIENT
Start: 2018-11-02 | End: 2018-11-02 | Stop reason: DRUGHIGH

## 2018-11-02 RX ORDER — METOCLOPRAMIDE HYDROCHLORIDE 5 MG/ML
INJECTION INTRAMUSCULAR; INTRAVENOUS PRN
Status: DISCONTINUED | OUTPATIENT
Start: 2018-11-02 | End: 2018-11-02 | Stop reason: SDUPTHER

## 2018-11-02 RX ORDER — DIPHENHYDRAMINE HYDROCHLORIDE 50 MG/ML
12.5 INJECTION INTRAMUSCULAR; INTRAVENOUS
Status: DISCONTINUED | OUTPATIENT
Start: 2018-11-02 | End: 2018-11-02 | Stop reason: HOSPADM

## 2018-11-02 RX ORDER — VANCOMYCIN HYDROCHLORIDE 1 G/200ML
1000 INJECTION, SOLUTION INTRAVENOUS ONCE
Status: COMPLETED | OUTPATIENT
Start: 2018-11-02 | End: 2018-11-02

## 2018-11-02 RX ORDER — MORPHINE SULFATE 4 MG/ML
4 INJECTION, SOLUTION INTRAMUSCULAR; INTRAVENOUS
Status: DISCONTINUED | OUTPATIENT
Start: 2018-11-02 | End: 2018-11-05

## 2018-11-02 RX ORDER — DEXTROSE, SODIUM CHLORIDE, AND POTASSIUM CHLORIDE 5; .45; .15 G/100ML; G/100ML; G/100ML
INJECTION INTRAVENOUS CONTINUOUS
Status: DISCONTINUED | OUTPATIENT
Start: 2018-11-02 | End: 2018-11-05

## 2018-11-02 RX ORDER — ALBUTEROL SULFATE 2.5 MG/3ML
2.5 SOLUTION RESPIRATORY (INHALATION)
Status: DISCONTINUED | OUTPATIENT
Start: 2018-11-02 | End: 2018-11-06

## 2018-11-02 RX ORDER — OXYCODONE HYDROCHLORIDE 5 MG/1
10 TABLET ORAL EVERY 4 HOURS PRN
Status: DISCONTINUED | OUTPATIENT
Start: 2018-11-02 | End: 2018-11-05

## 2018-11-02 RX ORDER — CEFAZOLIN SODIUM 2 G/100ML
2 INJECTION, SOLUTION INTRAVENOUS EVERY 8 HOURS
Status: COMPLETED | OUTPATIENT
Start: 2018-11-03 | End: 2018-11-03

## 2018-11-02 RX ORDER — SODIUM CHLORIDE 0.9 % (FLUSH) 0.9 %
10 SYRINGE (ML) INJECTION PRN
Status: DISCONTINUED | OUTPATIENT
Start: 2018-11-02 | End: 2018-11-02 | Stop reason: HOSPADM

## 2018-11-02 RX ORDER — KETOROLAC TROMETHAMINE 30 MG/ML
INJECTION, SOLUTION INTRAMUSCULAR; INTRAVENOUS PRN
Status: DISCONTINUED | OUTPATIENT
Start: 2018-11-02 | End: 2018-11-02 | Stop reason: SDUPTHER

## 2018-11-02 RX ORDER — ACETAMINOPHEN 325 MG/1
650 TABLET ORAL EVERY 4 HOURS PRN
Status: DISCONTINUED | OUTPATIENT
Start: 2018-11-02 | End: 2018-11-06 | Stop reason: HOSPADM

## 2018-11-02 RX ORDER — GUAIFENESIN 600 MG/1
600 TABLET, EXTENDED RELEASE ORAL 2 TIMES DAILY
Status: DISCONTINUED | OUTPATIENT
Start: 2018-11-02 | End: 2018-11-06 | Stop reason: HOSPADM

## 2018-11-02 RX ORDER — VECURONIUM BROMIDE 1 MG/ML
INJECTION, POWDER, LYOPHILIZED, FOR SOLUTION INTRAVENOUS PRN
Status: DISCONTINUED | OUTPATIENT
Start: 2018-11-02 | End: 2018-11-02 | Stop reason: SDUPTHER

## 2018-11-02 RX ORDER — DOCUSATE SODIUM 100 MG/1
100 CAPSULE, LIQUID FILLED ORAL 2 TIMES DAILY
Status: DISCONTINUED | OUTPATIENT
Start: 2018-11-02 | End: 2018-11-06 | Stop reason: HOSPADM

## 2018-11-02 RX ORDER — HYDROMORPHONE HCL 110MG/55ML
0.5 PATIENT CONTROLLED ANALGESIA SYRINGE INTRAVENOUS EVERY 5 MIN PRN
Status: DISCONTINUED | OUTPATIENT
Start: 2018-11-02 | End: 2018-11-02 | Stop reason: HOSPADM

## 2018-11-02 RX ORDER — LEVOTHYROXINE SODIUM 0.03 MG/1
50 TABLET ORAL
Status: DISCONTINUED | OUTPATIENT
Start: 2018-11-03 | End: 2018-11-06 | Stop reason: HOSPADM

## 2018-11-02 RX ORDER — SODIUM CHLORIDE 0.9 % (FLUSH) 0.9 %
10 SYRINGE (ML) INJECTION EVERY 12 HOURS SCHEDULED
Status: DISCONTINUED | OUTPATIENT
Start: 2018-11-02 | End: 2018-11-02 | Stop reason: HOSPADM

## 2018-11-02 RX ORDER — FLUTICASONE PROPIONATE 50 MCG
2 SPRAY, SUSPENSION (ML) NASAL DAILY
Status: DISCONTINUED | OUTPATIENT
Start: 2018-11-02 | End: 2018-11-06 | Stop reason: HOSPADM

## 2018-11-02 RX ORDER — HYDROMORPHONE HCL 110MG/55ML
0.25 PATIENT CONTROLLED ANALGESIA SYRINGE INTRAVENOUS EVERY 5 MIN PRN
Status: DISCONTINUED | OUTPATIENT
Start: 2018-11-02 | End: 2018-11-02 | Stop reason: HOSPADM

## 2018-11-02 RX ORDER — BUPIVACAINE HYDROCHLORIDE 5 MG/ML
INJECTION, SOLUTION PERINEURAL
Status: COMPLETED | OUTPATIENT
Start: 2018-11-02 | End: 2018-11-02

## 2018-11-02 RX ORDER — MAGNESIUM HYDROXIDE 1200 MG/15ML
LIQUID ORAL
Status: COMPLETED | OUTPATIENT
Start: 2018-11-02 | End: 2018-11-02

## 2018-11-02 RX ORDER — CEFAZOLIN SODIUM 1 G/3ML
INJECTION, POWDER, FOR SOLUTION INTRAMUSCULAR; INTRAVENOUS PRN
Status: DISCONTINUED | OUTPATIENT
Start: 2018-11-02 | End: 2018-11-02 | Stop reason: SDUPTHER

## 2018-11-02 RX ORDER — MIDAZOLAM HYDROCHLORIDE 1 MG/ML
INJECTION INTRAMUSCULAR; INTRAVENOUS PRN
Status: DISCONTINUED | OUTPATIENT
Start: 2018-11-02 | End: 2018-11-02 | Stop reason: SDUPTHER

## 2018-11-02 RX ORDER — PROPOFOL 10 MG/ML
INJECTION, EMULSION INTRAVENOUS PRN
Status: DISCONTINUED | OUTPATIENT
Start: 2018-11-02 | End: 2018-11-02 | Stop reason: SDUPTHER

## 2018-11-02 RX ORDER — ONDANSETRON 2 MG/ML
4 INJECTION INTRAMUSCULAR; INTRAVENOUS EVERY 6 HOURS PRN
Status: DISCONTINUED | OUTPATIENT
Start: 2018-11-02 | End: 2018-11-06 | Stop reason: HOSPADM

## 2018-11-02 RX ORDER — FLUOXETINE HYDROCHLORIDE 20 MG/1
60 CAPSULE ORAL DAILY
Status: DISCONTINUED | OUTPATIENT
Start: 2018-11-02 | End: 2018-11-06 | Stop reason: HOSPADM

## 2018-11-02 RX ORDER — SUCCINYLCHOLINE CHLORIDE 20 MG/ML
INJECTION INTRAMUSCULAR; INTRAVENOUS PRN
Status: DISCONTINUED | OUTPATIENT
Start: 2018-11-02 | End: 2018-11-02 | Stop reason: SDUPTHER

## 2018-11-02 RX ORDER — SODIUM CHLORIDE 9 MG/ML
INJECTION, SOLUTION INTRAVENOUS CONTINUOUS
Status: DISCONTINUED | OUTPATIENT
Start: 2018-11-02 | End: 2018-11-02

## 2018-11-02 RX ORDER — SODIUM CHLORIDE, SODIUM LACTATE, POTASSIUM CHLORIDE, CALCIUM CHLORIDE 600; 310; 30; 20 MG/100ML; MG/100ML; MG/100ML; MG/100ML
INJECTION, SOLUTION INTRAVENOUS ONCE
Status: COMPLETED | OUTPATIENT
Start: 2018-11-02 | End: 2018-11-02

## 2018-11-02 RX ORDER — PREGABALIN 75 MG/1
150 CAPSULE ORAL 2 TIMES DAILY
Status: DISCONTINUED | OUTPATIENT
Start: 2018-11-02 | End: 2018-11-06 | Stop reason: HOSPADM

## 2018-11-02 RX ORDER — ALBUTEROL SULFATE 90 UG/1
AEROSOL, METERED RESPIRATORY (INHALATION) PRN
Status: DISCONTINUED | OUTPATIENT
Start: 2018-11-02 | End: 2018-11-02 | Stop reason: SDUPTHER

## 2018-11-02 RX ORDER — NEOSTIGMINE METHYLSULFATE 1 MG/ML
INJECTION, SOLUTION INTRAVENOUS PRN
Status: DISCONTINUED | OUTPATIENT
Start: 2018-11-02 | End: 2018-11-02 | Stop reason: SDUPTHER

## 2018-11-02 RX ORDER — LORAZEPAM 1 MG/1
3 TABLET ORAL EVERY EVENING
Status: DISCONTINUED | OUTPATIENT
Start: 2018-11-02 | End: 2018-11-06 | Stop reason: HOSPADM

## 2018-11-02 RX ORDER — FENTANYL CITRATE 50 UG/ML
25 INJECTION, SOLUTION INTRAMUSCULAR; INTRAVENOUS EVERY 5 MIN PRN
Status: DISCONTINUED | OUTPATIENT
Start: 2018-11-02 | End: 2018-11-02 | Stop reason: HOSPADM

## 2018-11-02 RX ORDER — FENTANYL CITRATE 50 UG/ML
50 INJECTION, SOLUTION INTRAMUSCULAR; INTRAVENOUS EVERY 5 MIN PRN
Status: DISCONTINUED | OUTPATIENT
Start: 2018-11-02 | End: 2018-11-02 | Stop reason: HOSPADM

## 2018-11-02 RX ORDER — MORPHINE SULFATE 2 MG/ML
2 INJECTION, SOLUTION INTRAMUSCULAR; INTRAVENOUS
Status: DISCONTINUED | OUTPATIENT
Start: 2018-11-02 | End: 2018-11-05

## 2018-11-02 RX ADMIN — ALBUTEROL SULFATE 2 PUFF: 90 AEROSOL, METERED RESPIRATORY (INHALATION) at 16:15

## 2018-11-02 RX ADMIN — OXYCODONE HYDROCHLORIDE 5 MG: 5 TABLET ORAL at 22:47

## 2018-11-02 RX ADMIN — FENTANYL CITRATE 100 MCG: 50 INJECTION, SOLUTION INTRAMUSCULAR; INTRAVENOUS at 16:15

## 2018-11-02 RX ADMIN — FENTANYL CITRATE 100 MCG: 50 INJECTION, SOLUTION INTRAMUSCULAR; INTRAVENOUS at 16:30

## 2018-11-02 RX ADMIN — DOCUSATE SODIUM 100 MG: 100 CAPSULE, LIQUID FILLED ORAL at 22:47

## 2018-11-02 RX ADMIN — LORAZEPAM 1 MG: 1 TABLET ORAL at 22:54

## 2018-11-02 RX ADMIN — HYDROCORTISONE SODIUM SUCCINATE 100 MG: 100 INJECTION, POWDER, FOR SOLUTION INTRAMUSCULAR; INTRAVENOUS at 16:15

## 2018-11-02 RX ADMIN — FENTANYL CITRATE 50 MCG: 50 INJECTION, SOLUTION INTRAMUSCULAR; INTRAVENOUS at 17:14

## 2018-11-02 RX ADMIN — VECURONIUM BROMIDE 4 MG: 1 INJECTION, POWDER, LYOPHILIZED, FOR SOLUTION INTRAVENOUS at 16:15

## 2018-11-02 RX ADMIN — GLYCOPYRROLATE 0.6 MG: 0.2 INJECTION INTRAMUSCULAR; INTRAVENOUS at 17:14

## 2018-11-02 RX ADMIN — CEFAZOLIN 200 MG: 1 INJECTION, POWDER, FOR SOLUTION INTRAVENOUS at 16:29

## 2018-11-02 RX ADMIN — PROPOFOL 150 MG: 10 INJECTION, EMULSION INTRAVENOUS at 16:15

## 2018-11-02 RX ADMIN — SODIUM CHLORIDE, POTASSIUM CHLORIDE, SODIUM LACTATE AND CALCIUM CHLORIDE: 600; 310; 30; 20 INJECTION, SOLUTION INTRAVENOUS at 12:25

## 2018-11-02 RX ADMIN — KETOROLAC TROMETHAMINE 30 MG: 30 INJECTION, SOLUTION INTRAMUSCULAR at 18:24

## 2018-11-02 RX ADMIN — KETOROLAC TROMETHAMINE 30 MG: 30 INJECTION, SOLUTION INTRAMUSCULAR; INTRAVENOUS at 16:15

## 2018-11-02 RX ADMIN — Medication 3 MG: at 17:14

## 2018-11-02 RX ADMIN — MIDAZOLAM HYDROCHLORIDE 2 MG: 1 INJECTION, SOLUTION INTRAMUSCULAR; INTRAVENOUS at 16:15

## 2018-11-02 RX ADMIN — PREGABALIN 150 MG: 75 CAPSULE ORAL at 22:48

## 2018-11-02 RX ADMIN — GUAIFENESIN 600 MG: 600 TABLET, EXTENDED RELEASE ORAL at 22:47

## 2018-11-02 RX ADMIN — SODIUM CHLORIDE, POTASSIUM CHLORIDE, SODIUM LACTATE AND CALCIUM CHLORIDE: 600; 310; 30; 20 INJECTION, SOLUTION INTRAVENOUS at 16:10

## 2018-11-02 RX ADMIN — FAMOTIDINE 20 MG: 10 INJECTION INTRAVENOUS at 16:15

## 2018-11-02 RX ADMIN — OXYCODONE HYDROCHLORIDE 5 MG: 5 TABLET ORAL at 19:27

## 2018-11-02 RX ADMIN — ACETAMINOPHEN 650 MG: 325 TABLET, FILM COATED ORAL at 19:27

## 2018-11-02 RX ADMIN — METOCLOPRAMIDE 10 MG: 5 INJECTION, SOLUTION INTRAMUSCULAR; INTRAVENOUS at 16:15

## 2018-11-02 RX ADMIN — Medication 2 PUFF: at 19:36

## 2018-11-02 RX ADMIN — SUCCINYLCHOLINE CHLORIDE 100 MG: 20 INJECTION, SOLUTION INTRAMUSCULAR; INTRAVENOUS at 16:15

## 2018-11-02 RX ADMIN — LIDOCAINE HYDROCHLORIDE 100 MG: 10 INJECTION, SOLUTION EPIDURAL; INFILTRATION; INTRACAUDAL; PERINEURAL at 16:15

## 2018-11-02 RX ADMIN — MORPHINE SULFATE 2 MG: 2 INJECTION, SOLUTION INTRAMUSCULAR; INTRAVENOUS at 20:17

## 2018-11-02 RX ADMIN — ALBUTEROL SULFATE 2.5 MG: 2.5 SOLUTION RESPIRATORY (INHALATION) at 19:36

## 2018-11-02 RX ADMIN — POTASSIUM CHLORIDE, DEXTROSE MONOHYDRATE AND SODIUM CHLORIDE: 150; 5; 450 INJECTION, SOLUTION INTRAVENOUS at 18:24

## 2018-11-02 RX ADMIN — MORPHINE SULFATE 2 MG: 2 INJECTION, SOLUTION INTRAMUSCULAR; INTRAVENOUS at 19:26

## 2018-11-02 RX ADMIN — VANCOMYCIN HYDROCHLORIDE 1000 MG: 1 INJECTION, SOLUTION INTRAVENOUS at 15:25

## 2018-11-02 ASSESSMENT — PULMONARY FUNCTION TESTS
PIF_VALUE: 25
PIF_VALUE: 25
PIF_VALUE: 1
PIF_VALUE: 30
PIF_VALUE: 45
PIF_VALUE: 38
PIF_VALUE: 14
PIF_VALUE: 19
PIF_VALUE: 51
PIF_VALUE: 40
PIF_VALUE: 25
PIF_VALUE: 45
PIF_VALUE: 34
PIF_VALUE: 35
PIF_VALUE: 28
PIF_VALUE: 27
PIF_VALUE: 0
PIF_VALUE: 38
PIF_VALUE: 40
PIF_VALUE: 1
PIF_VALUE: 47
PIF_VALUE: 39
PIF_VALUE: 50
PIF_VALUE: 29
PIF_VALUE: 15
PIF_VALUE: 35
PIF_VALUE: 41
PIF_VALUE: 0
PIF_VALUE: 2
PIF_VALUE: 25
PIF_VALUE: 27
PIF_VALUE: 35
PIF_VALUE: 18
PIF_VALUE: 2
PIF_VALUE: 40
PIF_VALUE: 39
PIF_VALUE: 25
PIF_VALUE: 41
PIF_VALUE: 1
PIF_VALUE: 25
PIF_VALUE: 0
PIF_VALUE: 35
PIF_VALUE: 40
PIF_VALUE: 11
PIF_VALUE: 25
PIF_VALUE: 0
PIF_VALUE: 31
PIF_VALUE: 25
PIF_VALUE: 41
PIF_VALUE: 2
PIF_VALUE: 15
PIF_VALUE: 26
PIF_VALUE: 27
PIF_VALUE: 26
PIF_VALUE: 25
PIF_VALUE: 15
PIF_VALUE: 1
PIF_VALUE: 25
PIF_VALUE: 40
PIF_VALUE: 35
PIF_VALUE: 35
PIF_VALUE: 26
PIF_VALUE: 34
PIF_VALUE: 35
PIF_VALUE: 50
PIF_VALUE: 37
PIF_VALUE: 25
PIF_VALUE: 41
PIF_VALUE: 22
PIF_VALUE: 38
PIF_VALUE: 36
PIF_VALUE: 41
PIF_VALUE: 52
PIF_VALUE: 37
PIF_VALUE: 26
PIF_VALUE: 37
PIF_VALUE: 12
PIF_VALUE: 34
PIF_VALUE: 27
PIF_VALUE: 39
PIF_VALUE: 48
PIF_VALUE: 33
PIF_VALUE: 26
PIF_VALUE: 25
PIF_VALUE: 25
PIF_VALUE: 2
PIF_VALUE: 41
PIF_VALUE: 25
PIF_VALUE: 37
PIF_VALUE: 25
PIF_VALUE: 1
PIF_VALUE: 44
PIF_VALUE: 24
PIF_VALUE: 41
PIF_VALUE: 6
PIF_VALUE: 2
PIF_VALUE: 46
PIF_VALUE: 15
PIF_VALUE: 52
PIF_VALUE: 15
PIF_VALUE: 39

## 2018-11-02 ASSESSMENT — PAIN SCALES - GENERAL
PAINLEVEL_OUTOF10: 10
PAINLEVEL_OUTOF10: 8
PAINLEVEL_OUTOF10: 7
PAINLEVEL_OUTOF10: 10
PAINLEVEL_OUTOF10: 3

## 2018-11-03 ENCOUNTER — APPOINTMENT (OUTPATIENT)
Dept: GENERAL RADIOLOGY | Age: 73
DRG: 164 | End: 2018-11-03
Attending: THORACIC SURGERY (CARDIOTHORACIC VASCULAR SURGERY)
Payer: MEDICARE

## 2018-11-03 LAB
HCT VFR BLD CALC: 40.1 % (ref 36–48)
HEMOGLOBIN: 13.4 G/DL (ref 12–16)
MAGNESIUM: 1.8 MG/DL (ref 1.8–2.4)
MCH RBC QN AUTO: 32.1 PG (ref 26–34)
MCHC RBC AUTO-ENTMCNC: 33.4 G/DL (ref 31–36)
MCV RBC AUTO: 96.2 FL (ref 80–100)
PDW BLD-RTO: 13.6 % (ref 12.4–15.4)
PHOSPHORUS: 3.3 MG/DL (ref 2.5–4.9)
PLATELET # BLD: 154 K/UL (ref 135–450)
PMV BLD AUTO: 10.8 FL (ref 5–10.5)
RBC # BLD: 4.16 M/UL (ref 4–5.2)
WBC # BLD: 15.4 K/UL (ref 4–11)

## 2018-11-03 PROCEDURE — 94667 MNPJ CHEST WALL 1ST: CPT

## 2018-11-03 PROCEDURE — 51701 INSERT BLADDER CATHETER: CPT

## 2018-11-03 PROCEDURE — 84100 ASSAY OF PHOSPHORUS: CPT

## 2018-11-03 PROCEDURE — 94668 MNPJ CHEST WALL SBSQ: CPT

## 2018-11-03 PROCEDURE — 99024 POSTOP FOLLOW-UP VISIT: CPT | Performed by: THORACIC SURGERY (CARDIOTHORACIC VASCULAR SURGERY)

## 2018-11-03 PROCEDURE — 83735 ASSAY OF MAGNESIUM: CPT

## 2018-11-03 PROCEDURE — 2100000000 HC CCU R&B

## 2018-11-03 PROCEDURE — 94640 AIRWAY INHALATION TREATMENT: CPT

## 2018-11-03 PROCEDURE — 6360000002 HC RX W HCPCS: Performed by: THORACIC SURGERY (CARDIOTHORACIC VASCULAR SURGERY)

## 2018-11-03 PROCEDURE — 94664 DEMO&/EVAL PT USE INHALER: CPT

## 2018-11-03 PROCEDURE — 85027 COMPLETE CBC AUTOMATED: CPT

## 2018-11-03 PROCEDURE — 2580000003 HC RX 258

## 2018-11-03 PROCEDURE — 2500000003 HC RX 250 WO HCPCS: Performed by: THORACIC SURGERY (CARDIOTHORACIC VASCULAR SURGERY)

## 2018-11-03 PROCEDURE — 94760 N-INVAS EAR/PLS OXIMETRY 1: CPT

## 2018-11-03 PROCEDURE — 2700000000 HC OXYGEN THERAPY PER DAY

## 2018-11-03 PROCEDURE — 6370000000 HC RX 637 (ALT 250 FOR IP): Performed by: THORACIC SURGERY (CARDIOTHORACIC VASCULAR SURGERY)

## 2018-11-03 PROCEDURE — 71045 X-RAY EXAM CHEST 1 VIEW: CPT

## 2018-11-03 PROCEDURE — 94762 N-INVAS EAR/PLS OXIMTRY CONT: CPT

## 2018-11-03 PROCEDURE — 51798 US URINE CAPACITY MEASURE: CPT

## 2018-11-03 RX ORDER — SODIUM CHLORIDE 0.9 % (FLUSH) 0.9 %
SYRINGE (ML) INJECTION
Status: COMPLETED
Start: 2018-11-03 | End: 2018-11-03

## 2018-11-03 RX ADMIN — POTASSIUM CHLORIDE, DEXTROSE MONOHYDRATE AND SODIUM CHLORIDE: 150; 5; 450 INJECTION, SOLUTION INTRAVENOUS at 06:00

## 2018-11-03 RX ADMIN — ALBUTEROL SULFATE 2.5 MG: 2.5 SOLUTION RESPIRATORY (INHALATION) at 11:42

## 2018-11-03 RX ADMIN — FLUOXETINE HYDROCHLORIDE 60 MG: 20 CAPSULE ORAL at 08:55

## 2018-11-03 RX ADMIN — ACETAMINOPHEN 650 MG: 325 TABLET, FILM COATED ORAL at 01:53

## 2018-11-03 RX ADMIN — LISINOPRIL 20 MG: 20 TABLET ORAL at 09:01

## 2018-11-03 RX ADMIN — MORPHINE SULFATE 4 MG: 4 INJECTION INTRAVENOUS at 10:48

## 2018-11-03 RX ADMIN — KETOROLAC TROMETHAMINE 30 MG: 30 INJECTION, SOLUTION INTRAMUSCULAR at 20:19

## 2018-11-03 RX ADMIN — OXYCODONE HYDROCHLORIDE 10 MG: 5 TABLET ORAL at 15:11

## 2018-11-03 RX ADMIN — ALBUTEROL SULFATE 2.5 MG: 2.5 SOLUTION RESPIRATORY (INHALATION) at 08:16

## 2018-11-03 RX ADMIN — KETOROLAC TROMETHAMINE 30 MG: 30 INJECTION, SOLUTION INTRAMUSCULAR at 08:54

## 2018-11-03 RX ADMIN — ENOXAPARIN SODIUM 40 MG: 40 INJECTION SUBCUTANEOUS at 08:54

## 2018-11-03 RX ADMIN — GUAIFENESIN 600 MG: 600 TABLET, EXTENDED RELEASE ORAL at 08:54

## 2018-11-03 RX ADMIN — OXYCODONE HYDROCHLORIDE 10 MG: 5 TABLET ORAL at 08:54

## 2018-11-03 RX ADMIN — CLOPIDOGREL 75 MG: 75 TABLET, FILM COATED ORAL at 09:01

## 2018-11-03 RX ADMIN — DOCUSATE SODIUM 100 MG: 100 CAPSULE, LIQUID FILLED ORAL at 09:01

## 2018-11-03 RX ADMIN — Medication 10 ML: at 09:02

## 2018-11-03 RX ADMIN — OXYCODONE HYDROCHLORIDE 10 MG: 5 TABLET ORAL at 23:56

## 2018-11-03 RX ADMIN — OXYCODONE HYDROCHLORIDE 5 MG: 5 TABLET ORAL at 05:04

## 2018-11-03 RX ADMIN — ALBUTEROL SULFATE 2.5 MG: 2.5 SOLUTION RESPIRATORY (INHALATION) at 15:32

## 2018-11-03 RX ADMIN — Medication 2 PUFF: at 08:16

## 2018-11-03 RX ADMIN — GUAIFENESIN 600 MG: 600 TABLET, EXTENDED RELEASE ORAL at 20:20

## 2018-11-03 RX ADMIN — CEFAZOLIN SODIUM 2 G: 2 INJECTION, SOLUTION INTRAVENOUS at 08:54

## 2018-11-03 RX ADMIN — Medication 2 PUFF: at 19:38

## 2018-11-03 RX ADMIN — Medication 10 ML: at 10:49

## 2018-11-03 RX ADMIN — OXYCODONE HYDROCHLORIDE 10 MG: 5 TABLET ORAL at 18:57

## 2018-11-03 RX ADMIN — AMLODIPINE BESYLATE 5 MG: 5 TABLET ORAL at 09:01

## 2018-11-03 RX ADMIN — ACETAMINOPHEN 650 MG: 325 TABLET, FILM COATED ORAL at 13:33

## 2018-11-03 RX ADMIN — CEFAZOLIN SODIUM 2 G: 2 INJECTION, SOLUTION INTRAVENOUS at 00:04

## 2018-11-03 RX ADMIN — FLUTICASONE PROPIONATE 2 SPRAY: 50 SPRAY, METERED NASAL at 09:05

## 2018-11-03 RX ADMIN — KETOROLAC TROMETHAMINE 30 MG: 30 INJECTION, SOLUTION INTRAMUSCULAR at 01:53

## 2018-11-03 RX ADMIN — ALBUTEROL SULFATE 2.5 MG: 2.5 SOLUTION RESPIRATORY (INHALATION) at 19:38

## 2018-11-03 RX ADMIN — KETOROLAC TROMETHAMINE 30 MG: 30 INJECTION, SOLUTION INTRAMUSCULAR at 15:11

## 2018-11-03 RX ADMIN — KETOROLAC TROMETHAMINE 30 MG: 30 INJECTION, SOLUTION INTRAMUSCULAR at 23:55

## 2018-11-03 RX ADMIN — LEVOTHYROXINE SODIUM 50 MCG: 25 TABLET ORAL at 07:23

## 2018-11-03 RX ADMIN — DOCUSATE SODIUM 100 MG: 100 CAPSULE, LIQUID FILLED ORAL at 20:20

## 2018-11-03 RX ADMIN — MORPHINE SULFATE 2 MG: 2 INJECTION, SOLUTION INTRAMUSCULAR; INTRAVENOUS at 05:04

## 2018-11-03 RX ADMIN — PREGABALIN 150 MG: 75 CAPSULE ORAL at 20:20

## 2018-11-03 RX ADMIN — PREGABALIN 150 MG: 75 CAPSULE ORAL at 08:55

## 2018-11-03 RX ADMIN — LORAZEPAM 3 MG: 1 TABLET ORAL at 20:20

## 2018-11-03 ASSESSMENT — PAIN DESCRIPTION - DESCRIPTORS: DESCRIPTORS: ACHING

## 2018-11-03 ASSESSMENT — PAIN SCALES - GENERAL
PAINLEVEL_OUTOF10: 9
PAINLEVEL_OUTOF10: 7
PAINLEVEL_OUTOF10: 7
PAINLEVEL_OUTOF10: 0
PAINLEVEL_OUTOF10: 8
PAINLEVEL_OUTOF10: 0
PAINLEVEL_OUTOF10: 4
PAINLEVEL_OUTOF10: 2
PAINLEVEL_OUTOF10: 2
PAINLEVEL_OUTOF10: 0
PAINLEVEL_OUTOF10: 0
PAINLEVEL_OUTOF10: 7
PAINLEVEL_OUTOF10: 0
PAINLEVEL_OUTOF10: 7
PAINLEVEL_OUTOF10: 3

## 2018-11-03 ASSESSMENT — PAIN DESCRIPTION - ORIENTATION
ORIENTATION: LEFT
ORIENTATION: LEFT

## 2018-11-03 ASSESSMENT — PAIN DESCRIPTION - PAIN TYPE
TYPE: SURGICAL PAIN
TYPE: SURGICAL PAIN
TYPE: ACUTE PAIN

## 2018-11-03 ASSESSMENT — PAIN DESCRIPTION - LOCATION
LOCATION: BACK;NECK;SHOULDER
LOCATION: NECK;SHOULDER

## 2018-11-04 ENCOUNTER — APPOINTMENT (OUTPATIENT)
Dept: GENERAL RADIOLOGY | Age: 73
DRG: 164 | End: 2018-11-04
Attending: THORACIC SURGERY (CARDIOTHORACIC VASCULAR SURGERY)
Payer: MEDICARE

## 2018-11-04 PROCEDURE — 99024 POSTOP FOLLOW-UP VISIT: CPT | Performed by: THORACIC SURGERY (CARDIOTHORACIC VASCULAR SURGERY)

## 2018-11-04 PROCEDURE — 6360000002 HC RX W HCPCS: Performed by: THORACIC SURGERY (CARDIOTHORACIC VASCULAR SURGERY)

## 2018-11-04 PROCEDURE — 6370000000 HC RX 637 (ALT 250 FOR IP): Performed by: THORACIC SURGERY (CARDIOTHORACIC VASCULAR SURGERY)

## 2018-11-04 PROCEDURE — 94668 MNPJ CHEST WALL SBSQ: CPT

## 2018-11-04 PROCEDURE — 2100000000 HC CCU R&B

## 2018-11-04 PROCEDURE — 71045 X-RAY EXAM CHEST 1 VIEW: CPT

## 2018-11-04 PROCEDURE — 94760 N-INVAS EAR/PLS OXIMETRY 1: CPT

## 2018-11-04 PROCEDURE — 94640 AIRWAY INHALATION TREATMENT: CPT

## 2018-11-04 RX ADMIN — AMLODIPINE BESYLATE 5 MG: 5 TABLET ORAL at 08:24

## 2018-11-04 RX ADMIN — GUAIFENESIN 600 MG: 600 TABLET, EXTENDED RELEASE ORAL at 19:44

## 2018-11-04 RX ADMIN — KETOROLAC TROMETHAMINE 30 MG: 30 INJECTION, SOLUTION INTRAMUSCULAR at 06:26

## 2018-11-04 RX ADMIN — KETOROLAC TROMETHAMINE 30 MG: 30 INJECTION, SOLUTION INTRAMUSCULAR at 12:26

## 2018-11-04 RX ADMIN — GUAIFENESIN 600 MG: 600 TABLET, EXTENDED RELEASE ORAL at 08:24

## 2018-11-04 RX ADMIN — LORAZEPAM 3 MG: 1 TABLET ORAL at 17:37

## 2018-11-04 RX ADMIN — CLOPIDOGREL 75 MG: 75 TABLET, FILM COATED ORAL at 08:25

## 2018-11-04 RX ADMIN — ALBUTEROL SULFATE 2.5 MG: 2.5 SOLUTION RESPIRATORY (INHALATION) at 11:37

## 2018-11-04 RX ADMIN — DOCUSATE SODIUM 100 MG: 100 CAPSULE, LIQUID FILLED ORAL at 19:48

## 2018-11-04 RX ADMIN — Medication 2 PUFF: at 21:05

## 2018-11-04 RX ADMIN — ENOXAPARIN SODIUM 40 MG: 40 INJECTION SUBCUTANEOUS at 08:26

## 2018-11-04 RX ADMIN — FLUTICASONE PROPIONATE 2 SPRAY: 50 SPRAY, METERED NASAL at 08:27

## 2018-11-04 RX ADMIN — OXYCODONE HYDROCHLORIDE 10 MG: 5 TABLET ORAL at 08:26

## 2018-11-04 RX ADMIN — DOCUSATE SODIUM 100 MG: 100 CAPSULE, LIQUID FILLED ORAL at 08:24

## 2018-11-04 RX ADMIN — FLUOXETINE HYDROCHLORIDE 60 MG: 20 CAPSULE ORAL at 08:24

## 2018-11-04 RX ADMIN — ALBUTEROL SULFATE 2.5 MG: 2.5 SOLUTION RESPIRATORY (INHALATION) at 15:52

## 2018-11-04 RX ADMIN — LISINOPRIL 20 MG: 20 TABLET ORAL at 08:25

## 2018-11-04 RX ADMIN — LORAZEPAM 3 MG: 1 TABLET ORAL at 22:14

## 2018-11-04 RX ADMIN — PREGABALIN 150 MG: 75 CAPSULE ORAL at 08:25

## 2018-11-04 RX ADMIN — LEVOTHYROXINE SODIUM 50 MCG: 25 TABLET ORAL at 06:26

## 2018-11-04 RX ADMIN — ALBUTEROL SULFATE 2.5 MG: 2.5 SOLUTION RESPIRATORY (INHALATION) at 08:33

## 2018-11-04 RX ADMIN — OXYCODONE HYDROCHLORIDE 10 MG: 5 TABLET ORAL at 19:44

## 2018-11-04 RX ADMIN — ALBUTEROL SULFATE 2.5 MG: 2.5 SOLUTION RESPIRATORY (INHALATION) at 21:05

## 2018-11-04 RX ADMIN — PREGABALIN 150 MG: 75 CAPSULE ORAL at 19:44

## 2018-11-04 RX ADMIN — Medication 2 PUFF: at 08:33

## 2018-11-04 ASSESSMENT — PAIN SCALES - GENERAL
PAINLEVEL_OUTOF10: 7
PAINLEVEL_OUTOF10: 5
PAINLEVEL_OUTOF10: 7
PAINLEVEL_OUTOF10: 0
PAINLEVEL_OUTOF10: 3
PAINLEVEL_OUTOF10: 2
PAINLEVEL_OUTOF10: 8
PAINLEVEL_OUTOF10: 9

## 2018-11-04 ASSESSMENT — PAIN DESCRIPTION - LOCATION
LOCATION: FLANK
LOCATION: FLANK

## 2018-11-04 ASSESSMENT — PAIN DESCRIPTION - FREQUENCY
FREQUENCY: CONTINUOUS
FREQUENCY: CONTINUOUS

## 2018-11-04 ASSESSMENT — PAIN DESCRIPTION - DESCRIPTORS
DESCRIPTORS: ACHING;SORE
DESCRIPTORS: ACHING;SORE

## 2018-11-04 ASSESSMENT — PAIN DESCRIPTION - ONSET
ONSET: GRADUAL
ONSET: GRADUAL

## 2018-11-04 ASSESSMENT — PAIN DESCRIPTION - ORIENTATION
ORIENTATION: LEFT
ORIENTATION: LEFT

## 2018-11-04 ASSESSMENT — PAIN DESCRIPTION - DIRECTION
RADIATING_TOWARDS: BACK
RADIATING_TOWARDS: BACK

## 2018-11-04 ASSESSMENT — PAIN DESCRIPTION - PAIN TYPE
TYPE: SURGICAL PAIN
TYPE: SURGICAL PAIN

## 2018-11-04 ASSESSMENT — PAIN DESCRIPTION - PROGRESSION: CLINICAL_PROGRESSION: NOT CHANGED

## 2018-11-05 ENCOUNTER — APPOINTMENT (OUTPATIENT)
Dept: GENERAL RADIOLOGY | Age: 73
DRG: 164 | End: 2018-11-05
Attending: THORACIC SURGERY (CARDIOTHORACIC VASCULAR SURGERY)
Payer: MEDICARE

## 2018-11-05 PROCEDURE — 94668 MNPJ CHEST WALL SBSQ: CPT

## 2018-11-05 PROCEDURE — 6370000000 HC RX 637 (ALT 250 FOR IP): Performed by: NURSE PRACTITIONER

## 2018-11-05 PROCEDURE — 6370000000 HC RX 637 (ALT 250 FOR IP): Performed by: THORACIC SURGERY (CARDIOTHORACIC VASCULAR SURGERY)

## 2018-11-05 PROCEDURE — 2700000000 HC OXYGEN THERAPY PER DAY

## 2018-11-05 PROCEDURE — 99406 BEHAV CHNG SMOKING 3-10 MIN: CPT

## 2018-11-05 PROCEDURE — 94760 N-INVAS EAR/PLS OXIMETRY 1: CPT

## 2018-11-05 PROCEDURE — 71045 X-RAY EXAM CHEST 1 VIEW: CPT

## 2018-11-05 PROCEDURE — 6360000002 HC RX W HCPCS: Performed by: THORACIC SURGERY (CARDIOTHORACIC VASCULAR SURGERY)

## 2018-11-05 PROCEDURE — 2100000000 HC CCU R&B

## 2018-11-05 PROCEDURE — 94640 AIRWAY INHALATION TREATMENT: CPT

## 2018-11-05 RX ORDER — HYDROCODONE BITARTRATE AND ACETAMINOPHEN 5; 325 MG/1; MG/1
2 TABLET ORAL EVERY 4 HOURS PRN
Status: DISCONTINUED | OUTPATIENT
Start: 2018-11-05 | End: 2018-11-06 | Stop reason: HOSPADM

## 2018-11-05 RX ORDER — POLYETHYLENE GLYCOL 3350 17 G/17G
17 POWDER, FOR SOLUTION ORAL DAILY PRN
Status: DISCONTINUED | OUTPATIENT
Start: 2018-11-05 | End: 2018-11-06 | Stop reason: HOSPADM

## 2018-11-05 RX ADMIN — GUAIFENESIN 600 MG: 600 TABLET, EXTENDED RELEASE ORAL at 20:57

## 2018-11-05 RX ADMIN — FLUOXETINE HYDROCHLORIDE 60 MG: 20 CAPSULE ORAL at 09:00

## 2018-11-05 RX ADMIN — DOCUSATE SODIUM 100 MG: 100 CAPSULE, LIQUID FILLED ORAL at 20:57

## 2018-11-05 RX ADMIN — ALBUTEROL SULFATE 2.5 MG: 2.5 SOLUTION RESPIRATORY (INHALATION) at 11:32

## 2018-11-05 RX ADMIN — LORAZEPAM 3 MG: 1 TABLET ORAL at 23:25

## 2018-11-05 RX ADMIN — POLYETHYLENE GLYCOL 3350 17 G: 17 POWDER, FOR SOLUTION ORAL at 17:11

## 2018-11-05 RX ADMIN — PREGABALIN 150 MG: 75 CAPSULE ORAL at 09:00

## 2018-11-05 RX ADMIN — Medication 2 PUFF: at 08:34

## 2018-11-05 RX ADMIN — HYDROCODONE BITARTRATE AND ACETAMINOPHEN 2 TABLET: 5; 325 TABLET ORAL at 16:39

## 2018-11-05 RX ADMIN — HYDROCODONE BITARTRATE AND ACETAMINOPHEN 2 TABLET: 5; 325 TABLET ORAL at 20:57

## 2018-11-05 RX ADMIN — LEVOTHYROXINE SODIUM 50 MCG: 25 TABLET ORAL at 06:39

## 2018-11-05 RX ADMIN — DOCUSATE SODIUM 100 MG: 100 CAPSULE, LIQUID FILLED ORAL at 09:01

## 2018-11-05 RX ADMIN — OXYCODONE HYDROCHLORIDE 10 MG: 5 TABLET ORAL at 00:00

## 2018-11-05 RX ADMIN — OXYCODONE HYDROCHLORIDE 10 MG: 5 TABLET ORAL at 06:27

## 2018-11-05 RX ADMIN — PREGABALIN 150 MG: 75 CAPSULE ORAL at 20:57

## 2018-11-05 RX ADMIN — GUAIFENESIN 600 MG: 600 TABLET, EXTENDED RELEASE ORAL at 09:01

## 2018-11-05 RX ADMIN — ALBUTEROL SULFATE 2.5 MG: 2.5 SOLUTION RESPIRATORY (INHALATION) at 08:34

## 2018-11-05 RX ADMIN — ENOXAPARIN SODIUM 40 MG: 40 INJECTION SUBCUTANEOUS at 09:02

## 2018-11-05 RX ADMIN — FLUTICASONE PROPIONATE 2 SPRAY: 50 SPRAY, METERED NASAL at 09:07

## 2018-11-05 RX ADMIN — ALBUTEROL SULFATE 2.5 MG: 2.5 SOLUTION RESPIRATORY (INHALATION) at 15:31

## 2018-11-05 RX ADMIN — LISINOPRIL 20 MG: 20 TABLET ORAL at 09:01

## 2018-11-05 RX ADMIN — ALBUTEROL SULFATE 2.5 MG: 2.5 SOLUTION RESPIRATORY (INHALATION) at 21:01

## 2018-11-05 RX ADMIN — HYDROCODONE BITARTRATE AND ACETAMINOPHEN 2 TABLET: 5; 325 TABLET ORAL at 11:50

## 2018-11-05 RX ADMIN — CLOPIDOGREL 75 MG: 75 TABLET, FILM COATED ORAL at 09:02

## 2018-11-05 RX ADMIN — Medication 2 PUFF: at 21:01

## 2018-11-05 RX ADMIN — AMLODIPINE BESYLATE 5 MG: 5 TABLET ORAL at 09:01

## 2018-11-05 ASSESSMENT — PAIN DESCRIPTION - ORIENTATION
ORIENTATION: LEFT

## 2018-11-05 ASSESSMENT — PAIN SCALES - GENERAL
PAINLEVEL_OUTOF10: 6
PAINLEVEL_OUTOF10: 9
PAINLEVEL_OUTOF10: 7
PAINLEVEL_OUTOF10: 9
PAINLEVEL_OUTOF10: 4
PAINLEVEL_OUTOF10: 7
PAINLEVEL_OUTOF10: 8
PAINLEVEL_OUTOF10: 5

## 2018-11-05 ASSESSMENT — PAIN DESCRIPTION - ONSET
ONSET: ON-GOING

## 2018-11-05 ASSESSMENT — PAIN DESCRIPTION - FREQUENCY
FREQUENCY: CONTINUOUS

## 2018-11-05 ASSESSMENT — PAIN DESCRIPTION - PAIN TYPE
TYPE: SURGICAL PAIN
TYPE: NEUROPATHIC PAIN
TYPE: SURGICAL PAIN

## 2018-11-05 ASSESSMENT — PAIN DESCRIPTION - PROGRESSION
CLINICAL_PROGRESSION: GRADUALLY WORSENING

## 2018-11-05 ASSESSMENT — PAIN DESCRIPTION - DESCRIPTORS
DESCRIPTORS: NAGGING;CONSTANT
DESCRIPTORS: DISCOMFORT;CONSTANT
DESCRIPTORS: ACHING
DESCRIPTORS: DISCOMFORT;CONSTANT
DESCRIPTORS: DISCOMFORT;CONSTANT

## 2018-11-05 ASSESSMENT — PAIN DESCRIPTION - LOCATION
LOCATION: CHEST;FLANK
LOCATION: FLANK
LOCATION: GENERALIZED

## 2018-11-06 ENCOUNTER — APPOINTMENT (OUTPATIENT)
Dept: GENERAL RADIOLOGY | Age: 73
DRG: 164 | End: 2018-11-06
Attending: THORACIC SURGERY (CARDIOTHORACIC VASCULAR SURGERY)
Payer: MEDICARE

## 2018-11-06 VITALS
OXYGEN SATURATION: 96 % | RESPIRATION RATE: 16 BRPM | DIASTOLIC BLOOD PRESSURE: 75 MMHG | HEIGHT: 65 IN | WEIGHT: 161.82 LBS | BODY MASS INDEX: 26.96 KG/M2 | HEART RATE: 69 BPM | TEMPERATURE: 97.7 F | SYSTOLIC BLOOD PRESSURE: 142 MMHG

## 2018-11-06 LAB
BLOOD BANK DISPENSE STATUS: NORMAL
BLOOD BANK PRODUCT CODE: NORMAL
BPU ID: NORMAL
DESCRIPTION BLOOD BANK: NORMAL

## 2018-11-06 PROCEDURE — 6370000000 HC RX 637 (ALT 250 FOR IP): Performed by: NURSE PRACTITIONER

## 2018-11-06 PROCEDURE — 94640 AIRWAY INHALATION TREATMENT: CPT

## 2018-11-06 PROCEDURE — 94761 N-INVAS EAR/PLS OXIMETRY MLT: CPT

## 2018-11-06 PROCEDURE — 94762 N-INVAS EAR/PLS OXIMTRY CONT: CPT

## 2018-11-06 PROCEDURE — 6370000000 HC RX 637 (ALT 250 FOR IP): Performed by: THORACIC SURGERY (CARDIOTHORACIC VASCULAR SURGERY)

## 2018-11-06 PROCEDURE — 2700000000 HC OXYGEN THERAPY PER DAY

## 2018-11-06 PROCEDURE — 71045 X-RAY EXAM CHEST 1 VIEW: CPT

## 2018-11-06 PROCEDURE — 2580000003 HC RX 258

## 2018-11-06 PROCEDURE — 6360000002 HC RX W HCPCS: Performed by: THORACIC SURGERY (CARDIOTHORACIC VASCULAR SURGERY)

## 2018-11-06 PROCEDURE — 94668 MNPJ CHEST WALL SBSQ: CPT

## 2018-11-06 RX ORDER — IBUPROFEN 400 MG/1
400 TABLET ORAL EVERY 6 HOURS PRN
Status: DISCONTINUED | OUTPATIENT
Start: 2018-11-06 | End: 2018-11-06 | Stop reason: HOSPADM

## 2018-11-06 RX ORDER — PSEUDOEPHEDRINE HCL 30 MG
100 TABLET ORAL 2 TIMES DAILY
Qty: 60 CAPSULE | Refills: 0 | Status: SHIPPED | OUTPATIENT
Start: 2018-11-06 | End: 2018-11-28

## 2018-11-06 RX ORDER — SODIUM CHLORIDE 0.9 % (FLUSH) 0.9 %
SYRINGE (ML) INJECTION
Status: COMPLETED
Start: 2018-11-06 | End: 2018-11-06

## 2018-11-06 RX ORDER — TRAMADOL HYDROCHLORIDE 50 MG/1
50 TABLET ORAL
Qty: 28 TABLET | Refills: 0 | Status: SHIPPED | OUTPATIENT
Start: 2018-11-06 | End: 2018-11-13

## 2018-11-06 RX ORDER — IBUPROFEN 400 MG/1
400 TABLET ORAL EVERY 6 HOURS PRN
Qty: 90 TABLET | Refills: 3 | Status: ON HOLD | OUTPATIENT
Start: 2018-11-06 | End: 2022-01-01 | Stop reason: HOSPADM

## 2018-11-06 RX ORDER — TRAMADOL HYDROCHLORIDE 50 MG/1
50 TABLET ORAL
Status: DISCONTINUED | OUTPATIENT
Start: 2018-11-06 | End: 2018-11-06 | Stop reason: HOSPADM

## 2018-11-06 RX ORDER — ALBUTEROL SULFATE 2.5 MG/3ML
2.5 SOLUTION RESPIRATORY (INHALATION) 3 TIMES DAILY
Status: DISCONTINUED | OUTPATIENT
Start: 2018-11-06 | End: 2018-11-06 | Stop reason: HOSPADM

## 2018-11-06 RX ADMIN — ENOXAPARIN SODIUM 40 MG: 40 INJECTION SUBCUTANEOUS at 09:17

## 2018-11-06 RX ADMIN — LEVOTHYROXINE SODIUM 50 MCG: 25 TABLET ORAL at 06:23

## 2018-11-06 RX ADMIN — GUAIFENESIN 600 MG: 600 TABLET, EXTENDED RELEASE ORAL at 09:16

## 2018-11-06 RX ADMIN — ALBUTEROL SULFATE 2.5 MG: 2.5 SOLUTION RESPIRATORY (INHALATION) at 13:15

## 2018-11-06 RX ADMIN — AMLODIPINE BESYLATE 5 MG: 5 TABLET ORAL at 09:16

## 2018-11-06 RX ADMIN — TRAMADOL HYDROCHLORIDE 50 MG: 50 TABLET, FILM COATED ORAL at 10:36

## 2018-11-06 RX ADMIN — IBUPROFEN 400 MG: 400 TABLET ORAL at 14:08

## 2018-11-06 RX ADMIN — TRAMADOL HYDROCHLORIDE 50 MG: 50 TABLET, FILM COATED ORAL at 18:41

## 2018-11-06 RX ADMIN — HYDROCODONE BITARTRATE AND ACETAMINOPHEN 2 TABLET: 5; 325 TABLET ORAL at 14:08

## 2018-11-06 RX ADMIN — HYDROCODONE BITARTRATE AND ACETAMINOPHEN 2 TABLET: 5; 325 TABLET ORAL at 09:45

## 2018-11-06 RX ADMIN — HYDROCODONE BITARTRATE AND ACETAMINOPHEN 2 TABLET: 5; 325 TABLET ORAL at 18:41

## 2018-11-06 RX ADMIN — FLUOXETINE HYDROCHLORIDE 60 MG: 20 CAPSULE ORAL at 09:16

## 2018-11-06 RX ADMIN — HYDROCODONE BITARTRATE AND ACETAMINOPHEN 2 TABLET: 5; 325 TABLET ORAL at 04:44

## 2018-11-06 RX ADMIN — Medication 2 PUFF: at 08:12

## 2018-11-06 RX ADMIN — PREGABALIN 150 MG: 75 CAPSULE ORAL at 09:17

## 2018-11-06 RX ADMIN — FLUTICASONE PROPIONATE 2 SPRAY: 50 SPRAY, METERED NASAL at 09:28

## 2018-11-06 RX ADMIN — LISINOPRIL 20 MG: 20 TABLET ORAL at 09:16

## 2018-11-06 RX ADMIN — DOCUSATE SODIUM 100 MG: 100 CAPSULE, LIQUID FILLED ORAL at 09:17

## 2018-11-06 RX ADMIN — ALBUTEROL SULFATE 2.5 MG: 2.5 SOLUTION RESPIRATORY (INHALATION) at 08:12

## 2018-11-06 RX ADMIN — CLOPIDOGREL 75 MG: 75 TABLET, FILM COATED ORAL at 09:17

## 2018-11-06 RX ADMIN — Medication 10 ML: at 09:17

## 2018-11-06 ASSESSMENT — PAIN DESCRIPTION - LOCATION
LOCATION: FLANK;RIB CAGE
LOCATION: RIB CAGE;FLANK
LOCATION: FLANK

## 2018-11-06 ASSESSMENT — PAIN DESCRIPTION - DESCRIPTORS
DESCRIPTORS: CONSTANT;SHARP
DESCRIPTORS: CONSTANT
DESCRIPTORS: CONSTANT;SHARP

## 2018-11-06 ASSESSMENT — PAIN DESCRIPTION - ONSET
ONSET: ON-GOING

## 2018-11-06 ASSESSMENT — PAIN SCALES - GENERAL
PAINLEVEL_OUTOF10: 6
PAINLEVEL_OUTOF10: 6
PAINLEVEL_OUTOF10: 7
PAINLEVEL_OUTOF10: 8
PAINLEVEL_OUTOF10: 5
PAINLEVEL_OUTOF10: 6
PAINLEVEL_OUTOF10: 5
PAINLEVEL_OUTOF10: 7
PAINLEVEL_OUTOF10: 7
PAINLEVEL_OUTOF10: 6

## 2018-11-06 ASSESSMENT — PAIN DESCRIPTION - PROGRESSION
CLINICAL_PROGRESSION: GRADUALLY WORSENING

## 2018-11-06 ASSESSMENT — PAIN DESCRIPTION - PAIN TYPE
TYPE: SURGICAL PAIN

## 2018-11-06 ASSESSMENT — PAIN DESCRIPTION - ORIENTATION
ORIENTATION: LEFT

## 2018-11-06 ASSESSMENT — PAIN DESCRIPTION - FREQUENCY
FREQUENCY: CONTINUOUS

## 2018-11-06 NOTE — PROGRESS NOTES
Occupational/Physical Therapy  Rickluisito Aarons    OT/PT orders received. Attempted evaluations this AM. Pt sitting up in recliner upon arrival. Prior to therapists introducing selves, pt stated, \"I don't want therapy. \" This writer explained role of OT/PT in hospital setting. Pt stated again, \"I don't want therapy. I don't need it. I've been getting up with the nurses. \" Pt reports that pt lives with daughter and that she had all the equipment needs at home. Despite further education and encouragement, pt continued to refused. RN, Pretty Parada, notified. Will d/c pt from OT/PT caseload at this time. Please re-order if appropriate.     Thanks,  Carisa Yeboah, [facility], OTR/L [id number]  Litzy Schofield [place], DPT [id number]
Patient laying on right side sleeping. Will return shortly to see if she has awakened. NSR on monitor. Respirations even and easy.
This RN spoke with Dr. Madyson Galvan regarding chest x ray results, pt denies any SOB, lung sounds diminished-no change from this AM, O2 SAT 99% on 4 L HF. New order for portable chest x ray for tomorrow AM in place.
SURGICAL HISTORY  3-2010    subclavian stent left    OTHER SURGICAL HISTORY      stents bilateral femoral arteries    OTHER SURGICAL HISTORY Left 2/16/15    excision of left vulva lesion    MD THORSC DX LUNGS/PERICAR/MED/PLEURAL SPACE W/O BX Left 11/2/2018    LEFT VIDEO ASSISTED THORACOSCOPY, WEDGE RESECTION, MEDIASTINAL LYMPH NODE DISSECTION performed by Anna Marie Luke MD at [address]      stent each groin    VULVECTOMY      History of radical vulvectomy with a left inguinal lymph node dissection November 2008. Level of Consciousness: Alert, Oriented, and Cooperative = 0    Level of Activity: Walking with assistance = 1    Respiratory Pattern: Regular Pattern; RR 8-20 = 0    Breath Sounds: Diminshed bilaterally and/or crackles = 2    Sputum  Sputum Color: Tan, Dark red, Tenacity:  Thick, Sputum How Obtained: Spontaneous cough  Cough: Strong, spontaneous, non-productive = 0    Vital Signs   BP (!) 152/65   Pulse 70   Temp 97.8 °F (36.6 °C) (Temporal)   Resp 20   Ht 5' 5\" (1.651 m)   Wt 166 lb 14.2 oz (75.7 kg)   LMP 03/11/1991 (Approximate) Comment: menarche 7years old  SpO2 93%   BMI 27.77 kg/m²   SPO2 (COPD values may differ): 88-89% on room air or greater than 92% on FiO2 28- 35% = 2    Peak Flow (asthma only): not applicable = 0    RSI: 2-58 = TID (three times daily) and Q4hr PRN for dyspnea        Plan       Goals: medication delivery and volume expansion  Plan of Care: patient could benefit from treatments    Patient/caregiver was educated on the proper method of use of Respiratory Therapy device:  Yes      Level of understanding, patient was able to:(check appropriate box(es))   [x] Verbalize understanding   [x] Demonstrate understanding       [] Teach back        [] Needs reinforcement       []  No available caregiver               []  Other:     Response to education:  Excellent     Teaching Time:  5  minutes      Is patient being placed on Home Treatment

## 2018-11-12 NOTE — BRIEF OP NOTE
Brief Postoperative Note  ______________________________________________________________    Patient: Ba Ovalles  YOB: 1945  MRN: [id number]  Date of Procedure: 11/2/2018    Pre-Op Diagnosis: LUNG NODULE    Post-Op Diagnosis: Same       Procedure(s):  LEFT VIDEO ASSISTED THORACOSCOPY, WEDGE RESECTION, MEDIASTINAL LYMPH NODE DISSECTION    Anesthesia: General    Surgeon(s):  Marshal Pandey MD    Staff:  First Assistant: Jerl Goldberg, RN  Scrub Person First: Blake Ann RN     Estimated Blood Loss: * No values recorded between 11/2/2018  3:59 PM and 11/2/2018  0:61 PM * mL    Complications: None    Specimens:   ID Type Source Tests Collected by Time Destination   A :  Tissue Lung SURGICAL PATHOLOGY Marshal Pandey MD 11/2/2018 1729        Implants:  * No implants in log *      Drains:   [REMOVED] Chest Tube 1 Pleural 28 [place] (Removed)   Suction -20 cm H2O 11/4/2018  4:19 PM   Chest Tube Airleak Yes 11/4/2018  4:19 PM   Drainage Description Serosanguinous 11/4/2018  4:19 PM   Dressing Status Clean;Dry; Intact 11/4/2018  4:19 PM   Dressing Type Dry dressing 11/4/2018  4:19 PM   Site Assessment Not assessed 11/4/2018  4:19 PM   Surrounding Skin Unable to view 11/4/2018  4:19 PM   Patency Intervention Milked; Tip/Tilt 11/3/2018  8:35 PM   Output (ml) 80 ml 11/4/2018  8:11 AM       [REMOVED] Urethral Catheter Temperature probe 16 fr (Removed)   $ Urethral catheter insertion Inserted for procedure 11/2/2018  6:10 PM   Urine Color Yellow 11/2/2018  6:10 PM   Urine Appearance Clear 11/2/2018  6:10 PM   Output (mL) 200 mL 11/2/2018  6:10 PM       Findings: Extensive amount of adhesions unable to tolerate single lung ventilations            Marshal Pandey MD  Date: 11/12/2018  Time: 4:20 PM

## 2018-11-12 NOTE — DISCHARGE SUMMARY
Cardiac, Vascular & Thoracic Surgery  Discharge Summary    Patient:  Twyla Betancur 1945 [id number]   Admission Date:  11/2/2018 10:59 AM  Discharge Date:  11/6/2018    Principle Diagnosis:  Lung Nodule    Procedure: 1. Left video-associated thoracoscopy, wedge resection of left upper  lobe nodule. 2.  Bronchoscopy    History:  The patient is a 68 y.o. female we were ask to see in consultation  by Dr. Jacob Rodas for increase in size of anterior left upper lobe subpleural solid nodule which remains concerning for primary pulmonary neoplasm (such as  adenocarcinoma) in the setting of advanced smoking-related lung injury. CT guided biopsy left upper lobe nodule positive for Squamous cell carcinoma. Hospital Course: The patient underwent LEFT VIDEO ASSISTED THORACOSCOPY, WEDGE RESECTION, MEDIASTINAL LYMPH NODE DISSECTION on 11/02/2018 . The patient was discharged on 11/6/2018    Pathology:    Lung, left upper lobe, wedge resection:     - Invasive poorly-differentiated squamous cell carcinoma with focal       keratinization - SEE SYNOPTIC REPORT.     - Surgical resection margin negative for involvement    Disposition: Home    Condition: Stable    Medications:    Discharge Medications:   Светлана Gonzalez   Home Medication Instructions MQN:[id number]    Printed on:11/13/18 0950   Medication Information                      ADVAIR -21 MCG/ACT inhaler  INHALE 2 PUFFS BY MOUTH TWICE DAILY             albuterol (PROVENTIL HFA;VENTOLIN HFA) 108 (90 BASE) MCG/ACT inhaler  Inhale 2 puffs into the lungs every 6 hours as needed for Wheezing. amiodarone (CORDARONE) 200 MG tablet  Take 2 tablets by mouth 2 times daily for 2 days Start AM on 10/31/18. Last dose PM on 11/1/18             amLODIPine (NORVASC) 5 MG tablet  Take 5 mg by mouth daily             bisacodyl (DULCOLAX) 5 MG EC tablet  Take 5 mg by mouth nightly as needed for Constipation.              cilostazol (PLETAL) 50 MG tablet  Take 100 mg by Instructions: Activity: DO NOT LIFT, PUSH, OR PULL ANYTHING OVER 5 POUNDS FOR 8 WEEK from the day of surgery  Diet:  regular diet  Wound Care:  [address] YOUR INCISIONS DAILY WITH A CLEAN WASHCLOTH AND ANTIBACTERIAL SOAP.  Do not wash your incisions after you have cleansed other parts of your body      Follow up with Cardiothoracic Surgeon, Dr. Michelle Pete

## 2018-11-13 NOTE — H&P
Medications:   Prior to Admission medications    Medication Sig Start Date End Date Taking? Authorizing Provider   traMADol (ULTRAM) 50 MG tablet Take 1 tablet by mouth every 3 hours as needed for Pain (incisional) for up to 7 days. . 11/6/18 11/13/18 Yes Mortimer Crome, APRN - CNP   ibuprofen (ADVIL;MOTRIN) 400 MG tablet Take 1 tablet by mouth every 6 hours as needed for Pain (incisional) 11/6/18  Yes Mortimer Crome, APRN - CNP   docusate sodium (COLACE, DULCOLAX) 100 MG CAPS Take 100 mg by mouth 2 times daily 11/6/18  Yes Mortimer Crome, APRN - CNP   ADVAIR -21 MCG/ACT inhaler INHALE 2 PUFFS BY MOUTH TWICE DAILY 10/31/18  Yes Shila Gmoez MD   nicotine (NICODERM CQ) 21 MG/24HR Place 1 patch onto the skin every 24 hours 10/19/18  Yes Sherie Elliott MD   cilostazol (PLETAL) 50 MG tablet Take 100 mg by mouth 2 times daily   Yes Historical Provider, MD   amLODIPine (NORVASC) 5 MG tablet Take 5 mg by mouth daily   Yes Historical Provider, MD   levalbuterol (XOPENEX) 0.63 MG/3ML nebulization Take 3 mLs by nebulization every 4 hours as needed for Wheezing Dx: COPD   ICD-10: J44.9 9/26/18  Yes Shila Gomez MD   INCRUSE ELLIPTA 62.5 MCG/INH AEPB USE 1 INHALATION DAILY AS DIRECTED 8/15/18  Yes Shila Gomez MD   exemestane (AROMASIN) 25 MG chemo tablet TAKE 1 TABLET BY MOUTH ONCE A DAY FOR BREAST CANCER 5/12/17  Yes Kristie Alvarez MD   FLUoxetine (PROZAC) 20 MG capsule Take 3 capsules by mouth daily 6/10/15  Yes SUSIE To CNP   HYDROcodone-acetaminophen (NORCO)  MG per tablet Take 1 tablet by mouth every 6 hours as needed for Pain. 2/16/15  Yes Bard Nichol MD   levothyroxine (SYNTHROID) 50 MCG tablet Take 50 mcg by mouth Daily. Yes Historical Provider, MD   albuterol (PROVENTIL HFA;VENTOLIN HFA) 108 (90 BASE) MCG/ACT inhaler Inhale 2 puffs into the lungs every 6 hours as needed for Wheezing. Yes Historical Provider, MD   guaifenesin (MUCINEX) 600 MG SR tablet Take 800 mg by mouth 3 times daily.

## 2018-11-22 PROBLEM — Z01.810 PREOP CARDIOVASCULAR EXAM: Status: RESOLVED | Noted: 2018-10-23 | Resolved: 2018-11-22

## 2018-11-23 ENCOUNTER — HOSPITAL ENCOUNTER (OUTPATIENT)
Dept: CT IMAGING | Age: 73
Discharge: HOME | End: 2018-11-23
Payer: MEDICARE

## 2018-11-23 DIAGNOSIS — I71.20 THORACIC AORTIC ANEURYSM WITHOUT RUPTURE: ICD-10-CM

## 2018-11-23 PROCEDURE — 74174 CTA ABD&PLVS W/CONTRAST: CPT

## 2018-11-23 PROCEDURE — 71275 CT ANGIOGRAPHY CHEST: CPT

## 2018-11-23 PROCEDURE — 6360000004 HC RX CONTRAST MEDICATION: Performed by: INTERNAL MEDICINE

## 2018-11-23 RX ADMIN — IOPAMIDOL 75 ML: 755 INJECTION, SOLUTION INTRAVENOUS at 12:38

## 2018-11-28 ENCOUNTER — OFFICE VISIT (OUTPATIENT)
Dept: CARDIOTHORACIC SURGERY | Age: 73
End: 2018-11-28

## 2018-11-28 ENCOUNTER — TELEPHONE (OUTPATIENT)
Dept: CARDIOTHORACIC SURGERY | Age: 73
End: 2018-11-28

## 2018-11-28 VITALS
WEIGHT: 162 LBS | DIASTOLIC BLOOD PRESSURE: 92 MMHG | HEART RATE: 62 BPM | TEMPERATURE: 97.9 F | SYSTOLIC BLOOD PRESSURE: 150 MMHG | HEIGHT: 65 IN | OXYGEN SATURATION: 95 % | BODY MASS INDEX: 26.99 KG/M2

## 2018-11-28 DIAGNOSIS — C34.12 PRIMARY CANCER OF LEFT UPPER LOBE OF LUNG (HCC): Primary | ICD-10-CM

## 2018-11-28 DIAGNOSIS — C34.92 SQUAMOUS CELL CARCINOMA OF LEFT LUNG (HCC): ICD-10-CM

## 2018-11-28 PROCEDURE — 99024 POSTOP FOLLOW-UP VISIT: CPT | Performed by: THORACIC SURGERY (CARDIOTHORACIC VASCULAR SURGERY)

## 2018-11-30 ENCOUNTER — OFFICE VISIT (OUTPATIENT)
Dept: VASCULAR SURGERY | Age: 73
End: 2018-11-30
Payer: MEDICARE

## 2018-11-30 VITALS
DIASTOLIC BLOOD PRESSURE: 80 MMHG | HEIGHT: 65 IN | WEIGHT: 163 LBS | SYSTOLIC BLOOD PRESSURE: 136 MMHG | BODY MASS INDEX: 27.16 KG/M2

## 2018-11-30 DIAGNOSIS — I65.23 CAROTID ATHEROSCLEROSIS, BILATERAL: ICD-10-CM

## 2018-11-30 DIAGNOSIS — I77.810 THORACIC AORTIC ECTASIA (HCC): Primary | ICD-10-CM

## 2018-11-30 PROCEDURE — 3017F COLORECTAL CA SCREEN DOC REV: CPT | Performed by: SURGERY

## 2018-11-30 PROCEDURE — 4040F PNEUMOC VAC/ADMIN/RCVD: CPT | Performed by: SURGERY

## 2018-11-30 PROCEDURE — G8427 DOCREV CUR MEDS BY ELIG CLIN: HCPCS | Performed by: SURGERY

## 2018-11-30 PROCEDURE — G8482 FLU IMMUNIZE ORDER/ADMIN: HCPCS | Performed by: SURGERY

## 2018-11-30 PROCEDURE — 4004F PT TOBACCO SCREEN RCVD TLK: CPT | Performed by: SURGERY

## 2018-11-30 PROCEDURE — 1111F DSCHRG MED/CURRENT MED MERGE: CPT | Performed by: SURGERY

## 2018-11-30 PROCEDURE — G8399 PT W/DXA RESULTS DOCUMENT: HCPCS | Performed by: SURGERY

## 2018-11-30 PROCEDURE — G8417 CALC BMI ABV UP PARAM F/U: HCPCS | Performed by: SURGERY

## 2018-11-30 PROCEDURE — 99213 OFFICE O/P EST LOW 20 MIN: CPT | Performed by: SURGERY

## 2018-11-30 PROCEDURE — 1101F PT FALLS ASSESS-DOCD LE1/YR: CPT | Performed by: SURGERY

## 2018-11-30 PROCEDURE — 1123F ACP DISCUSS/DSCN MKR DOCD: CPT | Performed by: SURGERY

## 2018-11-30 PROCEDURE — 1090F PRES/ABSN URINE INCON ASSESS: CPT | Performed by: SURGERY

## 2018-11-30 PROCEDURE — G8598 ASA/ANTIPLAT THER USED: HCPCS | Performed by: SURGERY

## 2018-11-30 NOTE — PROGRESS NOTES
[facility])   Vascular Surgery Followup    Referring Provider:  Mick Dickey MD     Chief Complaint   Patient presents with    Follow-up        History of Present Illness:   69-year-old female here today for follow-up of thoracic aneurysm. She underwent routine surveillance CT of her chest, abdomen and pelvis and is here today to discuss the results. Her history is also significant for left subclavian artery stenting. She also has a history of asymptomatic carotid atherosclerosis. He denies TIA, stroke or amaurosis. Denies chest or abdominal pain. She is doing well following her left lung wedge resection. Past Medical History:   has a past medical history of Aortic aneurysm ([address] Arthritis; Buerger's disease ([address] Cancer of vulva ([address] Cataract; COPD (chronic obstructive pulmonary disease) ([address] Emphysema (subcutaneous) (surgical) resulting from a procedure; History of radiation therapy; Hypertension; Lung bullae ([address] and Periph vascular dis. Surgical History:   has a past surgical history that includes Breast surgery; Knee arthroscopy; Vulvectomy (); other surgical history (3-2010); eye surgery; knee surgery (9/1/10); vascular surgery; Breast lumpectomy (Right, 2/6/14); other surgical history; other surgical history (Left, 2/16/15); joint replacement (Bilateral); bronchoscopy (01/25/2018); and pr thorsc dx lungs/pericar/med/pleural space w/o bx (Left, 11/2/2018). Social History:   reports that she has been smoking Cigarettes. She has a 114.00 pack-year smoking history. She has never used smokeless tobacco. She reports that she uses drugs. She reports that she does not drink alcohol. Family History:  family history includes Breast Cancer in her maternal aunt; Cancer in her mother; Heart Disease in her brother; Stroke in her sister.      Home Medications:  Current Outpatient Prescriptions   Medication Sig Dispense Refill    ibuprofen (ADVIL;MOTRIN) 400 MG tablet Take 1 tablet by mouth every 6 hours as needed for Pain (incisional) 90 tablet 3    ADVAIR -21 MCG/ACT inhaler INHALE 2 PUFFS BY MOUTH TWICE DAILY 1 Inhaler 0    cilostazol (PLETAL) 50 MG tablet Take 100 mg by mouth 2 times daily      amLODIPine (NORVASC) 5 MG tablet Take 5 mg by mouth daily      levalbuterol (XOPENEX) 0.63 MG/3ML nebulization Take 3 mLs by nebulization every 4 hours as needed for Wheezing Dx: COPD   ICD-10: J44.9 360 mL 0    INCRUSE ELLIPTA 62.5 MCG/INH AEPB USE 1 INHALATION DAILY AS DIRECTED 1 each 2    exemestane (AROMASIN) 25 MG chemo tablet TAKE 1 TABLET BY MOUTH ONCE A DAY FOR BREAST CANCER 90 tablet 0    clopidogrel (PLAVIX) 75 MG tablet Take 75 mg by mouth daily      OXYGEN Inhale into the lungs At night prn      FLUoxetine (PROZAC) 20 MG capsule Take 3 capsules by mouth daily 90 capsule 3    HYDROcodone-acetaminophen (NORCO)  MG per tablet Take 1 tablet by mouth every 6 hours as needed for Pain. 15 tablet 0    bisacodyl (DULCOLAX) 5 MG EC tablet Take 5 mg by mouth nightly as needed for Constipation.  levothyroxine (SYNTHROID) 50 MCG tablet Take 50 mcg by mouth Daily.  albuterol (PROVENTIL HFA;VENTOLIN HFA) 108 (90 BASE) MCG/ACT inhaler Inhale 2 puffs into the lungs every 6 hours as needed for Wheezing.  guaifenesin (MUCINEX) 600 MG SR tablet Take 800 mg by mouth 3 times daily.  potassium chloride SA (K-DUR;KLOR-CON) 20 MEQ tablet Take 20 mEq by mouth daily       pregabalin (LYRICA) 150 MG capsule Take 150 mg by mouth 2 times daily.  rosuvastatin (CRESTOR) 10 MG tablet Take 20 mg by mouth daily.  lisinopril (PRINIVIL;ZESTRIL) 10 MG tablet Take 20 mg by mouth daily       lorazepam (ATIVAN) 1 MG tablet Take 3 mg by mouth every evening.  fluticasone (FLONASE) 50 MCG/ACT nasal spray 2 sprays by Nasal route daily. No current facility-administered medications for this visit.         Allergies:  Codeine and Penicillins     Review of Systems: · Constitutional: there has been no unanticipated weight loss. There's been no change in energy level, sleep pattern, or activity level. · Eyes: No visual changes or diplopia. No scleral icterus. · ENT: No Headaches, hearing loss or vertigo. No mouth sores or sore throat. · Cardiovascular: Reviewed in HPI  · Respiratory: No cough or wheezing, no sputum production. No hematemesis. · Gastrointestinal: No abdominal pain, appetite loss, blood in stools. No change in bowel or bladder habits. · Genitourinary: No dysuria, trouble voiding, or hematuria. · Musculoskeletal:  No gait disturbance, weakness or joint complaints. · Integumentary: No rash or pruritis. · Neurological: No headache, diplopia, change in muscle strength, numbness or tingling. No change in gait, balance, coordination, mood, affect, memory, mentation, behavior. · Psychiatric: No anxiety, no depression. · Endocrine: No malaise, fatigue or temperature intolerance. No excessive thirst, fluid intake, or urination. No tremor. · Hematologic/Lymphatic: No abnormal bruising or bleeding, blood clots or swollen lymph nodes. · Allergic/Immunologic: No nasal congestion or hives. Physical Examination:    Vitals:    11/30/18 1158   BP: 136/80          General appearance: alert, appears stated age, cooperative and no distress  Head: Normocephalic, without obvious abnormality, atraumatic  Eyes: conjunctivae/corneas clear. PERRL, EOM's intact. Fundi benign  Neck: no adenopathy, no carotid bruit, no JVD, supple, symmetrical, trachea midline and thyroid: not enlarged, symmetric, no tenderness/mass/nodules  Lungs: clear to auscultation bilaterally  Heart: regular rate and rhythm  Abdomen: soft, non-tender.  Bowel sounds normal. No masses,  no organomegaly  Extremities: extremities normal, atraumatic, no cyanosis or edema    Pulses:   L brachial 2 R brachial 2   L radial 2 R radial 2   L femoral 2 R femoral 2   L popliteal 1 R popliteal 1   L posterior

## 2018-12-03 ENCOUNTER — OFFICE VISIT (OUTPATIENT)
Dept: PULMONOLOGY | Age: 73
End: 2018-12-03
Payer: MEDICARE

## 2018-12-03 VITALS
TEMPERATURE: 99.2 F | OXYGEN SATURATION: 94 % | HEART RATE: 70 BPM | RESPIRATION RATE: 24 BRPM | DIASTOLIC BLOOD PRESSURE: 84 MMHG | HEIGHT: 65 IN | WEIGHT: 162 LBS | SYSTOLIC BLOOD PRESSURE: 138 MMHG | BODY MASS INDEX: 26.99 KG/M2

## 2018-12-03 DIAGNOSIS — C34.92 SQUAMOUS CELL CARCINOMA OF LEFT LUNG (HCC): ICD-10-CM

## 2018-12-03 DIAGNOSIS — J44.9 MODERATE COPD (CHRONIC OBSTRUCTIVE PULMONARY DISEASE) (HCC): ICD-10-CM

## 2018-12-03 DIAGNOSIS — J06.9 UPPER RESPIRATORY TRACT INFECTION, UNSPECIFIED TYPE: Primary | ICD-10-CM

## 2018-12-03 PROCEDURE — 3023F SPIROM DOC REV: CPT | Performed by: INTERNAL MEDICINE

## 2018-12-03 PROCEDURE — 1090F PRES/ABSN URINE INCON ASSESS: CPT | Performed by: INTERNAL MEDICINE

## 2018-12-03 PROCEDURE — 1123F ACP DISCUSS/DSCN MKR DOCD: CPT | Performed by: INTERNAL MEDICINE

## 2018-12-03 PROCEDURE — 1111F DSCHRG MED/CURRENT MED MERGE: CPT | Performed by: INTERNAL MEDICINE

## 2018-12-03 PROCEDURE — G8417 CALC BMI ABV UP PARAM F/U: HCPCS | Performed by: INTERNAL MEDICINE

## 2018-12-03 PROCEDURE — 3017F COLORECTAL CA SCREEN DOC REV: CPT | Performed by: INTERNAL MEDICINE

## 2018-12-03 PROCEDURE — 4004F PT TOBACCO SCREEN RCVD TLK: CPT | Performed by: INTERNAL MEDICINE

## 2018-12-03 PROCEDURE — G8482 FLU IMMUNIZE ORDER/ADMIN: HCPCS | Performed by: INTERNAL MEDICINE

## 2018-12-03 PROCEDURE — G8427 DOCREV CUR MEDS BY ELIG CLIN: HCPCS | Performed by: INTERNAL MEDICINE

## 2018-12-03 PROCEDURE — G8399 PT W/DXA RESULTS DOCUMENT: HCPCS | Performed by: INTERNAL MEDICINE

## 2018-12-03 PROCEDURE — 99214 OFFICE O/P EST MOD 30 MIN: CPT | Performed by: INTERNAL MEDICINE

## 2018-12-03 PROCEDURE — 4040F PNEUMOC VAC/ADMIN/RCVD: CPT | Performed by: INTERNAL MEDICINE

## 2018-12-03 PROCEDURE — 1101F PT FALLS ASSESS-DOCD LE1/YR: CPT | Performed by: INTERNAL MEDICINE

## 2018-12-03 PROCEDURE — G8598 ASA/ANTIPLAT THER USED: HCPCS | Performed by: INTERNAL MEDICINE

## 2018-12-03 PROCEDURE — G8926 SPIRO NO PERF OR DOC: HCPCS | Performed by: INTERNAL MEDICINE

## 2018-12-03 RX ORDER — LEVOFLOXACIN 500 MG/1
500 TABLET, FILM COATED ORAL DAILY
Qty: 10 TABLET | Refills: 0 | Status: SHIPPED | OUTPATIENT
Start: 2018-12-03 | End: 2018-12-13

## 2018-12-03 RX ORDER — LEVALBUTEROL INHALATION SOLUTION 0.63 MG/3ML
0.63 SOLUTION RESPIRATORY (INHALATION) EVERY 4 HOURS PRN
Qty: 360 ML | Refills: 5 | Status: SHIPPED | OUTPATIENT
Start: 2018-12-03 | End: 2019-10-15 | Stop reason: SDUPTHER

## 2018-12-03 RX ORDER — OSELTAMIVIR PHOSPHATE 75 MG/1
75 CAPSULE ORAL 2 TIMES DAILY
Qty: 10 CAPSULE | Refills: 0 | Status: SHIPPED | OUTPATIENT
Start: 2018-12-03 | End: 2018-12-08

## 2018-12-03 RX ORDER — LEVALBUTEROL INHALATION SOLUTION 0.63 MG/3ML
SOLUTION RESPIRATORY (INHALATION)
Qty: 1125 ML | Refills: 5 | OUTPATIENT
Start: 2018-12-03

## 2018-12-03 NOTE — COMMUNICATION BODY
Assessment:     Diagnosis Orders   1. Upper respiratory tract infection, unspecified type     2. Squamous cell carcinoma of left lung ([facility]     3.  Moderate COPD (chronic obstructive pulmonary disease) (HCC)                 Plan:    · I reviewed her recent surgery results  · Plan for repeating PET/CT and follow-up with oncology  · She has an upper respiratory infection with concern about influenza  · I will order Tamiflu course and Levaquin  · Continue Advair 230 twice a day, Spiriva daily and albuterol HFA when necessary  · Continue albuterol neb treatment twice a day and add a flutter valve for airway clearance  · Return to see in 1 month

## 2018-12-05 RX ORDER — UMECLIDINIUM 62.5 UG/1
AEROSOL, POWDER ORAL
Qty: 1 EACH | Refills: 1 | Status: SHIPPED | OUTPATIENT
Start: 2018-12-05 | End: 2019-02-08 | Stop reason: SDUPTHER

## 2018-12-12 ENCOUNTER — HOSPITAL ENCOUNTER (OUTPATIENT)
Age: 73
End: 2018-12-12
Payer: MEDICARE

## 2018-12-12 ENCOUNTER — HOSPITAL ENCOUNTER (OUTPATIENT)
Dept: PET IMAGING | Age: 73
Discharge: HOME | End: 2018-12-12
Payer: MEDICARE

## 2018-12-12 VITALS — HEIGHT: 65 IN | WEIGHT: 164 LBS | BODY MASS INDEX: 27.32 KG/M2

## 2018-12-12 DIAGNOSIS — R91.1 LUNG NODULE: ICD-10-CM

## 2018-12-12 PROCEDURE — 78815 PET IMAGE W/CT SKULL-THIGH: CPT

## 2018-12-12 PROCEDURE — A9552 F18 FDG: HCPCS | Performed by: THORACIC SURGERY (CARDIOTHORACIC VASCULAR SURGERY)

## 2018-12-12 PROCEDURE — 3430000000 HC RX DIAGNOSTIC RADIOPHARMACEUTICAL: Performed by: THORACIC SURGERY (CARDIOTHORACIC VASCULAR SURGERY)

## 2018-12-12 RX ORDER — ALBUTEROL SULFATE 2.5 MG/3ML
2.5 SOLUTION RESPIRATORY (INHALATION) EVERY 6 HOURS PRN
Qty: 120 EACH | Refills: 3 | Status: SHIPPED | OUTPATIENT
Start: 2018-12-12 | End: 2019-10-15

## 2018-12-12 RX ORDER — FLUDEOXYGLUCOSE F 18 200 MCI/ML
13.74 INJECTION, SOLUTION INTRAVENOUS
Status: COMPLETED | OUTPATIENT
Start: 2018-12-12 | End: 2018-12-12

## 2018-12-12 RX ADMIN — FLUDEOXYGLUCOSE F 18 13.74 MILLICURIE: 200 INJECTION, SOLUTION INTRAVENOUS at 12:10

## 2018-12-21 ENCOUNTER — HOSPITAL ENCOUNTER (OUTPATIENT)
Dept: VASCULAR LAB | Age: 73
Discharge: HOME | End: 2018-12-21
Payer: MEDICARE

## 2018-12-21 ENCOUNTER — HOSPITAL ENCOUNTER (OUTPATIENT)
Dept: GENERAL RADIOLOGY | Age: 73
Discharge: HOME | End: 2018-12-21
Payer: MEDICARE

## 2018-12-21 DIAGNOSIS — M81.0 OSTEOPOROSIS, UNSPECIFIED OSTEOPOROSIS TYPE, UNSPECIFIED PATHOLOGICAL FRACTURE PRESENCE: ICD-10-CM

## 2018-12-21 DIAGNOSIS — I65.23 CAROTID ATHEROSCLEROSIS, BILATERAL: ICD-10-CM

## 2018-12-21 PROCEDURE — 93880 EXTRACRANIAL BILAT STUDY: CPT

## 2018-12-21 PROCEDURE — 77080 DXA BONE DENSITY AXIAL: CPT

## 2018-12-28 ENCOUNTER — TELEPHONE (OUTPATIENT)
Dept: CARDIOTHORACIC SURGERY | Age: 73
End: 2018-12-28

## 2018-12-31 RX ORDER — FLUTICASONE PROPIONATE AND SALMETEROL XINAFOATE 230; 21 UG/1; UG/1
AEROSOL, METERED RESPIRATORY (INHALATION)
Qty: 1 INHALER | Refills: 3 | OUTPATIENT
Start: 2018-12-31

## 2019-01-10 ENCOUNTER — TELEPHONE (OUTPATIENT)
Dept: VASCULAR SURGERY | Age: 74
End: 2019-01-10

## 2019-01-10 DIAGNOSIS — I65.23 ATHEROSCLEROSIS OF BOTH CAROTID ARTERIES: Primary | ICD-10-CM

## 2019-01-11 ENCOUNTER — OFFICE VISIT (OUTPATIENT)
Dept: PULMONOLOGY | Age: 74
End: 2019-01-11
Payer: MEDICARE

## 2019-01-11 VITALS
BODY MASS INDEX: 27.49 KG/M2 | HEIGHT: 65 IN | OXYGEN SATURATION: 92 % | DIASTOLIC BLOOD PRESSURE: 96 MMHG | WEIGHT: 165 LBS | SYSTOLIC BLOOD PRESSURE: 158 MMHG | RESPIRATION RATE: 18 BRPM | HEART RATE: 74 BPM

## 2019-01-11 DIAGNOSIS — C34.92 SQUAMOUS CELL CARCINOMA OF LEFT LUNG (HCC): ICD-10-CM

## 2019-01-11 DIAGNOSIS — Z72.0 TOBACCO ABUSE: ICD-10-CM

## 2019-01-11 DIAGNOSIS — J43.2 CENTRILOBULAR EMPHYSEMA (HCC): ICD-10-CM

## 2019-01-11 DIAGNOSIS — J44.9 MODERATE COPD (CHRONIC OBSTRUCTIVE PULMONARY DISEASE) (HCC): Primary | ICD-10-CM

## 2019-01-11 PROCEDURE — G8926 SPIRO NO PERF OR DOC: HCPCS | Performed by: INTERNAL MEDICINE

## 2019-01-11 PROCEDURE — 99214 OFFICE O/P EST MOD 30 MIN: CPT | Performed by: INTERNAL MEDICINE

## 2019-01-11 PROCEDURE — G8399 PT W/DXA RESULTS DOCUMENT: HCPCS | Performed by: INTERNAL MEDICINE

## 2019-01-11 PROCEDURE — 4004F PT TOBACCO SCREEN RCVD TLK: CPT | Performed by: INTERNAL MEDICINE

## 2019-01-11 PROCEDURE — 1090F PRES/ABSN URINE INCON ASSESS: CPT | Performed by: INTERNAL MEDICINE

## 2019-01-11 PROCEDURE — G8417 CALC BMI ABV UP PARAM F/U: HCPCS | Performed by: INTERNAL MEDICINE

## 2019-01-11 PROCEDURE — G8482 FLU IMMUNIZE ORDER/ADMIN: HCPCS | Performed by: INTERNAL MEDICINE

## 2019-01-11 PROCEDURE — 3023F SPIROM DOC REV: CPT | Performed by: INTERNAL MEDICINE

## 2019-01-11 PROCEDURE — 1101F PT FALLS ASSESS-DOCD LE1/YR: CPT | Performed by: INTERNAL MEDICINE

## 2019-01-11 PROCEDURE — G8427 DOCREV CUR MEDS BY ELIG CLIN: HCPCS | Performed by: INTERNAL MEDICINE

## 2019-01-11 PROCEDURE — 4040F PNEUMOC VAC/ADMIN/RCVD: CPT | Performed by: INTERNAL MEDICINE

## 2019-01-11 PROCEDURE — G8598 ASA/ANTIPLAT THER USED: HCPCS | Performed by: INTERNAL MEDICINE

## 2019-01-11 PROCEDURE — 1123F ACP DISCUSS/DSCN MKR DOCD: CPT | Performed by: INTERNAL MEDICINE

## 2019-01-11 PROCEDURE — 3017F COLORECTAL CA SCREEN DOC REV: CPT | Performed by: INTERNAL MEDICINE

## 2019-02-11 ENCOUNTER — HOSPITAL ENCOUNTER (OUTPATIENT)
Dept: WOMENS IMAGING | Age: 74
Discharge: HOME | End: 2019-02-11
Payer: MEDICARE

## 2019-02-11 ENCOUNTER — OFFICE VISIT (OUTPATIENT)
Dept: SURGERY | Age: 74
End: 2019-02-11
Payer: MEDICARE

## 2019-02-11 VITALS
SYSTOLIC BLOOD PRESSURE: 138 MMHG | HEART RATE: 74 BPM | TEMPERATURE: 97.6 F | DIASTOLIC BLOOD PRESSURE: 72 MMHG | OXYGEN SATURATION: 95 % | HEIGHT: 65 IN | WEIGHT: 165 LBS | BODY MASS INDEX: 27.49 KG/M2

## 2019-02-11 DIAGNOSIS — N64.59 ABNORMAL BREAST EXAM: ICD-10-CM

## 2019-02-11 DIAGNOSIS — Z12.31 VISIT FOR SCREENING MAMMOGRAM: ICD-10-CM

## 2019-02-11 DIAGNOSIS — Z85.3 PERSONAL HISTORY OF BREAST CANCER: Primary | ICD-10-CM

## 2019-02-11 PROCEDURE — G8427 DOCREV CUR MEDS BY ELIG CLIN: HCPCS | Performed by: SURGERY

## 2019-02-11 PROCEDURE — G8399 PT W/DXA RESULTS DOCUMENT: HCPCS | Performed by: SURGERY

## 2019-02-11 PROCEDURE — 99213 OFFICE O/P EST LOW 20 MIN: CPT | Performed by: SURGERY

## 2019-02-11 PROCEDURE — 3017F COLORECTAL CA SCREEN DOC REV: CPT | Performed by: SURGERY

## 2019-02-11 PROCEDURE — 1090F PRES/ABSN URINE INCON ASSESS: CPT | Performed by: SURGERY

## 2019-02-11 PROCEDURE — 4040F PNEUMOC VAC/ADMIN/RCVD: CPT | Performed by: SURGERY

## 2019-02-11 PROCEDURE — 1101F PT FALLS ASSESS-DOCD LE1/YR: CPT | Performed by: SURGERY

## 2019-02-11 PROCEDURE — 4004F PT TOBACCO SCREEN RCVD TLK: CPT | Performed by: SURGERY

## 2019-02-11 PROCEDURE — G8598 ASA/ANTIPLAT THER USED: HCPCS | Performed by: SURGERY

## 2019-02-11 PROCEDURE — 1123F ACP DISCUSS/DSCN MKR DOCD: CPT | Performed by: SURGERY

## 2019-02-11 PROCEDURE — G8482 FLU IMMUNIZE ORDER/ADMIN: HCPCS | Performed by: SURGERY

## 2019-02-11 PROCEDURE — G8417 CALC BMI ABV UP PARAM F/U: HCPCS | Performed by: SURGERY

## 2019-02-11 RX ORDER — UMECLIDINIUM 62.5 UG/1
AEROSOL, POWDER ORAL
Qty: 1 EACH | Refills: 6 | Status: SHIPPED | OUTPATIENT
Start: 2019-02-11

## 2019-02-11 ASSESSMENT — ENCOUNTER SYMPTOMS
ABDOMINAL PAIN: 1
CONSTIPATION: 1
EYES NEGATIVE: 1
WHEEZING: 1
BACK PAIN: 1
SHORTNESS OF BREATH: 1

## 2019-02-18 ENCOUNTER — HOSPITAL ENCOUNTER (OUTPATIENT)
Dept: ULTRASOUND IMAGING | Age: 74
Discharge: HOME | End: 2019-02-18
Payer: MEDICARE

## 2019-02-18 ENCOUNTER — HOSPITAL ENCOUNTER (OUTPATIENT)
Dept: WOMENS IMAGING | Age: 74
Discharge: HOME | End: 2019-02-18
Payer: MEDICARE

## 2019-02-18 ENCOUNTER — PRE-PROCEDURE TELEPHONE (OUTPATIENT)
Dept: WOMENS IMAGING | Age: 74
End: 2019-02-18

## 2019-02-18 DIAGNOSIS — Z85.3 PERSONAL HISTORY OF BREAST CANCER: ICD-10-CM

## 2019-02-18 DIAGNOSIS — N64.59 ABNORMAL BREAST EXAM: ICD-10-CM

## 2019-02-18 PROCEDURE — 76642 ULTRASOUND BREAST LIMITED: CPT

## 2019-02-18 PROCEDURE — G0279 TOMOSYNTHESIS, MAMMO: HCPCS

## 2019-02-19 DIAGNOSIS — R92.8 ABNORMAL FINDING ON BREAST IMAGING: Primary | ICD-10-CM

## 2019-02-19 DIAGNOSIS — Z85.3 HISTORY OF BREAST CANCER: ICD-10-CM

## 2019-02-20 ENCOUNTER — HOSPITAL ENCOUNTER (OUTPATIENT)
Dept: WOMENS IMAGING | Age: 74
Discharge: HOME | End: 2019-02-20
Payer: MEDICARE

## 2019-02-20 ENCOUNTER — HOSPITAL ENCOUNTER (OUTPATIENT)
Dept: ULTRASOUND IMAGING | Age: 74
Discharge: HOME | End: 2019-02-20
Payer: MEDICARE

## 2019-02-20 DIAGNOSIS — Z85.3 HISTORY OF BREAST CANCER: ICD-10-CM

## 2019-02-20 DIAGNOSIS — R92.8 ABNORMAL MAMMOGRAM: ICD-10-CM

## 2019-02-20 DIAGNOSIS — R92.8 ABNORMAL FINDING ON BREAST IMAGING: ICD-10-CM

## 2019-02-20 PROCEDURE — 77065 DX MAMMO INCL CAD UNI: CPT

## 2019-02-20 PROCEDURE — 88342 IMHCHEM/IMCYTCHM 1ST ANTB: CPT

## 2019-02-20 PROCEDURE — 2709999900 US BREAST BIOPSY W LOC DEVICE 1ST LESION RIGHT

## 2019-02-20 PROCEDURE — 88305 TISSUE EXAM BY PATHOLOGIST: CPT

## 2019-02-20 PROCEDURE — 2500000003 HC RX 250 WO HCPCS: Performed by: SURGERY

## 2019-02-20 RX ORDER — LIDOCAINE HYDROCHLORIDE AND EPINEPHRINE 10; 10 MG/ML; UG/ML
20 INJECTION, SOLUTION INFILTRATION; PERINEURAL ONCE
Status: COMPLETED | OUTPATIENT
Start: 2019-02-20 | End: 2019-02-20

## 2019-02-20 RX ORDER — LIDOCAINE HYDROCHLORIDE 10 MG/ML
5 INJECTION, SOLUTION EPIDURAL; INFILTRATION; INTRACAUDAL; PERINEURAL ONCE
Status: COMPLETED | OUTPATIENT
Start: 2019-02-20 | End: 2019-02-20

## 2019-02-20 RX ADMIN — LIDOCAINE HYDROCHLORIDE 5 ML: 10 INJECTION, SOLUTION EPIDURAL; INFILTRATION; INTRACAUDAL; PERINEURAL at 09:18

## 2019-02-20 RX ADMIN — LIDOCAINE HYDROCHLORIDE AND EPINEPHRINE 5 ML: 10; 10 INJECTION, SOLUTION INFILTRATION; PERINEURAL at 09:19

## 2019-02-20 ASSESSMENT — PAIN SCALES - GENERAL: PAINLEVEL_OUTOF10: 0

## 2019-02-26 ENCOUNTER — TELEPHONE (OUTPATIENT)
Dept: SURGERY | Age: 74
End: 2019-02-26

## 2019-02-26 DIAGNOSIS — R92.8 ABNORMAL FINDINGS ON DIAGNOSTIC IMAGING OF BREAST: Primary | ICD-10-CM

## 2019-02-26 DIAGNOSIS — Z85.3 PERSONAL HISTORY OF BREAST CANCER: ICD-10-CM

## 2019-03-01 ENCOUNTER — HOSPITAL ENCOUNTER (OUTPATIENT)
Dept: MRI IMAGING | Age: 74
Discharge: HOME | End: 2019-03-01
Payer: MEDICARE

## 2019-03-01 DIAGNOSIS — M54.12 CERVICAL RADICULOPATHY: ICD-10-CM

## 2019-03-01 PROCEDURE — 72141 MRI NECK SPINE W/O DYE: CPT

## 2019-03-17 ENCOUNTER — HOSPITAL ENCOUNTER (INPATIENT)
Age: 74
LOS: 1 days | Discharge: HOME | DRG: 552 | End: 2019-03-19
Attending: EMERGENCY MEDICINE | Admitting: INTERNAL MEDICINE
Payer: MEDICARE

## 2019-03-17 DIAGNOSIS — R53.1 GENERALIZED WEAKNESS: ICD-10-CM

## 2019-03-17 DIAGNOSIS — F17.200 SMOKER: ICD-10-CM

## 2019-03-17 DIAGNOSIS — R26.2 UNABLE TO WALK: ICD-10-CM

## 2019-03-17 DIAGNOSIS — R20.2 NUMBNESS AND TINGLING: Primary | ICD-10-CM

## 2019-03-17 DIAGNOSIS — I63.81 LACUNAR STROKE (HCC): ICD-10-CM

## 2019-03-17 DIAGNOSIS — R20.0 NUMBNESS AND TINGLING: Primary | ICD-10-CM

## 2019-03-17 PROCEDURE — 99285 EMERGENCY DEPT VISIT HI MDM: CPT

## 2019-03-17 RX ORDER — NICOTINE 21 MG/24HR
1 PATCH, TRANSDERMAL 24 HOURS TRANSDERMAL DAILY
Status: DISCONTINUED | OUTPATIENT
Start: 2019-03-18 | End: 2019-03-18

## 2019-03-17 RX ORDER — LEVOFLOXACIN 750 MG/1
750 TABLET ORAL DAILY
COMMUNITY
End: 2019-04-04

## 2019-03-18 ENCOUNTER — APPOINTMENT (OUTPATIENT)
Dept: CT IMAGING | Age: 74
DRG: 552 | End: 2019-03-18
Payer: MEDICARE

## 2019-03-18 ENCOUNTER — APPOINTMENT (OUTPATIENT)
Dept: GENERAL RADIOLOGY | Age: 74
DRG: 552 | End: 2019-03-18
Payer: MEDICARE

## 2019-03-18 ENCOUNTER — APPOINTMENT (OUTPATIENT)
Dept: MRI IMAGING | Age: 74
DRG: 552 | End: 2019-03-18
Payer: MEDICARE

## 2019-03-18 PROBLEM — R53.1 GENERALIZED WEAKNESS: Status: ACTIVE | Noted: 2019-03-18

## 2019-03-18 LAB
A/G RATIO: 1.5 (ref 1.1–2.2)
ALBUMIN SERPL-MCNC: 4 G/DL (ref 3.4–5)
ALP BLD-CCNC: 96 U/L (ref 40–129)
ALT SERPL-CCNC: 9 U/L (ref 10–40)
ANION GAP SERPL CALCULATED.3IONS-SCNC: 11 MMOL/L (ref 3–16)
AST SERPL-CCNC: 10 U/L (ref 15–37)
BASOPHILS ABSOLUTE: 0.1 K/UL (ref 0–0.2)
BASOPHILS RELATIVE PERCENT: 1 %
BILIRUB SERPL-MCNC: <0.2 MG/DL (ref 0–1)
BUN BLDV-MCNC: 7 MG/DL (ref 7–20)
CALCIUM SERPL-MCNC: 8.5 MG/DL (ref 8.3–10.6)
CHLORIDE BLD-SCNC: 103 MMOL/L (ref 99–110)
CO2: 26 MMOL/L (ref 21–32)
CREAT SERPL-MCNC: 0.8 MG/DL (ref 0.6–1.2)
EOSINOPHILS ABSOLUTE: 0.3 K/UL (ref 0–0.6)
EOSINOPHILS RELATIVE PERCENT: 2.6 %
ESTIMATED AVERAGE GLUCOSE: 131.2 MG/DL
GFR AFRICAN AMERICAN: >60
GFR NON-AFRICAN AMERICAN: >60
GLOBULIN: 2.6 G/DL
GLUCOSE BLD-MCNC: 135 MG/DL (ref 70–99)
HBA1C MFR BLD: 6.2 %
HCT VFR BLD CALC: 44.9 % (ref 36–48)
HEMOGLOBIN: 15 G/DL (ref 12–16)
LYMPHOCYTES ABSOLUTE: 2.1 K/UL (ref 1–5.1)
LYMPHOCYTES RELATIVE PERCENT: 19.9 %
MCH RBC QN AUTO: 31.1 PG (ref 26–34)
MCHC RBC AUTO-ENTMCNC: 33.3 G/DL (ref 31–36)
MCV RBC AUTO: 93.3 FL (ref 80–100)
MONOCYTES ABSOLUTE: 0.6 K/UL (ref 0–1.3)
MONOCYTES RELATIVE PERCENT: 5.6 %
NEUTROPHILS ABSOLUTE: 7.4 K/UL (ref 1.7–7.7)
NEUTROPHILS RELATIVE PERCENT: 70.9 %
PDW BLD-RTO: 14.8 % (ref 12.4–15.4)
PLATELET # BLD: 183 K/UL (ref 135–450)
PMV BLD AUTO: 10 FL (ref 5–10.5)
POTASSIUM SERPL-SCNC: 3.7 MMOL/L (ref 3.5–5.1)
RBC # BLD: 4.81 M/UL (ref 4–5.2)
SODIUM BLD-SCNC: 140 MMOL/L (ref 136–145)
TOTAL PROTEIN: 6.6 G/DL (ref 6.4–8.2)
TSH REFLEX: 2.17 UIU/ML (ref 0.27–4.2)
WBC # BLD: 10.4 K/UL (ref 4–11)

## 2019-03-18 PROCEDURE — 70498 CT ANGIOGRAPHY NECK: CPT

## 2019-03-18 PROCEDURE — 6360000004 HC RX CONTRAST MEDICATION: Performed by: PHYSICIAN ASSISTANT

## 2019-03-18 PROCEDURE — 94760 N-INVAS EAR/PLS OXIMETRY 1: CPT

## 2019-03-18 PROCEDURE — 70450 CT HEAD/BRAIN W/O DYE: CPT

## 2019-03-18 PROCEDURE — 80053 COMPREHEN METABOLIC PANEL: CPT

## 2019-03-18 PROCEDURE — 1200000000 HC SEMI PRIVATE

## 2019-03-18 PROCEDURE — 72141 MRI NECK SPINE W/O DYE: CPT

## 2019-03-18 PROCEDURE — 94640 AIRWAY INHALATION TREATMENT: CPT

## 2019-03-18 PROCEDURE — 70551 MRI BRAIN STEM W/O DYE: CPT

## 2019-03-18 PROCEDURE — 99223 1ST HOSP IP/OBS HIGH 75: CPT | Performed by: PSYCHIATRY & NEUROLOGY

## 2019-03-18 PROCEDURE — 83036 HEMOGLOBIN GLYCOSYLATED A1C: CPT

## 2019-03-18 PROCEDURE — 97166 OT EVAL MOD COMPLEX 45 MIN: CPT

## 2019-03-18 PROCEDURE — 97530 THERAPEUTIC ACTIVITIES: CPT

## 2019-03-18 PROCEDURE — 85025 COMPLETE CBC W/AUTO DIFF WBC: CPT

## 2019-03-18 PROCEDURE — 6370000000 HC RX 637 (ALT 250 FOR IP): Performed by: NURSE PRACTITIONER

## 2019-03-18 PROCEDURE — 6370000000 HC RX 637 (ALT 250 FOR IP): Performed by: PHYSICIAN ASSISTANT

## 2019-03-18 PROCEDURE — 84443 ASSAY THYROID STIM HORMONE: CPT

## 2019-03-18 PROCEDURE — 70496 CT ANGIOGRAPHY HEAD: CPT

## 2019-03-18 PROCEDURE — 97161 PT EVAL LOW COMPLEX 20 MIN: CPT

## 2019-03-18 PROCEDURE — 71045 X-RAY EXAM CHEST 1 VIEW: CPT

## 2019-03-18 PROCEDURE — 6370000000 HC RX 637 (ALT 250 FOR IP): Performed by: INTERNAL MEDICINE

## 2019-03-18 PROCEDURE — 2580000003 HC RX 258: Performed by: PHYSICIAN ASSISTANT

## 2019-03-18 PROCEDURE — 6360000002 HC RX W HCPCS: Performed by: PHYSICIAN ASSISTANT

## 2019-03-18 RX ORDER — LABETALOL HYDROCHLORIDE 5 MG/ML
10 INJECTION, SOLUTION INTRAVENOUS EVERY 10 MIN PRN
Status: DISCONTINUED | OUTPATIENT
Start: 2019-03-18 | End: 2019-03-19 | Stop reason: HOSPADM

## 2019-03-18 RX ORDER — CLOPIDOGREL BISULFATE 75 MG/1
75 TABLET ORAL DAILY
Status: DISCONTINUED | OUTPATIENT
Start: 2019-03-18 | End: 2019-03-18

## 2019-03-18 RX ORDER — SODIUM CHLORIDE 0.9 % (FLUSH) 0.9 %
10 SYRINGE (ML) INJECTION PRN
Status: DISCONTINUED | OUTPATIENT
Start: 2019-03-18 | End: 2019-03-19 | Stop reason: HOSPADM

## 2019-03-18 RX ORDER — PREGABALIN 75 MG/1
150 CAPSULE ORAL 2 TIMES DAILY
Status: DISCONTINUED | OUTPATIENT
Start: 2019-03-18 | End: 2019-03-19 | Stop reason: HOSPADM

## 2019-03-18 RX ORDER — EXEMESTANE 25 MG/1
25 TABLET ORAL DAILY
Status: DISCONTINUED | OUTPATIENT
Start: 2019-03-18 | End: 2019-03-19 | Stop reason: HOSPADM

## 2019-03-18 RX ORDER — NICOTINE 21 MG/24HR
1 PATCH, TRANSDERMAL 24 HOURS TRANSDERMAL DAILY
Status: DISCONTINUED | OUTPATIENT
Start: 2019-03-18 | End: 2019-03-18

## 2019-03-18 RX ORDER — SODIUM CHLORIDE 0.9 % (FLUSH) 0.9 %
10 SYRINGE (ML) INJECTION EVERY 12 HOURS SCHEDULED
Status: DISCONTINUED | OUTPATIENT
Start: 2019-03-18 | End: 2019-03-19 | Stop reason: HOSPADM

## 2019-03-18 RX ORDER — ALBUTEROL SULFATE 2.5 MG/3ML
2.5 SOLUTION RESPIRATORY (INHALATION) EVERY 6 HOURS PRN
Status: DISCONTINUED | OUTPATIENT
Start: 2019-03-18 | End: 2019-03-19 | Stop reason: HOSPADM

## 2019-03-18 RX ORDER — ROSUVASTATIN CALCIUM 20 MG/1
20 TABLET, COATED ORAL DAILY
Status: DISCONTINUED | OUTPATIENT
Start: 2019-03-18 | End: 2019-03-19 | Stop reason: HOSPADM

## 2019-03-18 RX ORDER — FLUOXETINE HYDROCHLORIDE 20 MG/1
60 CAPSULE ORAL DAILY
Status: DISCONTINUED | OUTPATIENT
Start: 2019-03-18 | End: 2019-03-19 | Stop reason: HOSPADM

## 2019-03-18 RX ORDER — NICOTINE 21 MG/24HR
1 PATCH, TRANSDERMAL 24 HOURS TRANSDERMAL DAILY
Status: DISCONTINUED | OUTPATIENT
Start: 2019-03-19 | End: 2019-03-18

## 2019-03-18 RX ORDER — LORAZEPAM 1 MG/1
1 TABLET ORAL ONCE
Status: COMPLETED | OUTPATIENT
Start: 2019-03-18 | End: 2019-03-18

## 2019-03-18 RX ORDER — ASPIRIN 81 MG/1
324 TABLET, CHEWABLE ORAL ONCE
Status: COMPLETED | OUTPATIENT
Start: 2019-03-18 | End: 2019-03-18

## 2019-03-18 RX ORDER — LEVOTHYROXINE SODIUM 0.03 MG/1
50 TABLET ORAL DAILY
Status: DISCONTINUED | OUTPATIENT
Start: 2019-03-18 | End: 2019-03-19 | Stop reason: HOSPADM

## 2019-03-18 RX ORDER — NICOTINE 21 MG/24HR
1 PATCH, TRANSDERMAL 24 HOURS TRANSDERMAL ONCE
Status: COMPLETED | OUTPATIENT
Start: 2019-03-18 | End: 2019-03-19

## 2019-03-18 RX ORDER — ONDANSETRON 2 MG/ML
4 INJECTION INTRAMUSCULAR; INTRAVENOUS EVERY 6 HOURS PRN
Status: DISCONTINUED | OUTPATIENT
Start: 2019-03-18 | End: 2019-03-19 | Stop reason: HOSPADM

## 2019-03-18 RX ORDER — HYDROCODONE BITARTRATE AND ACETAMINOPHEN 10; 325 MG/1; MG/1
1 TABLET ORAL EVERY 6 HOURS PRN
Status: DISCONTINUED | OUTPATIENT
Start: 2019-03-18 | End: 2019-03-19 | Stop reason: HOSPADM

## 2019-03-18 RX ORDER — LACTOBACILLUS RHAMNOSUS GG 10B CELL
1 CAPSULE ORAL 2 TIMES DAILY WITH MEALS
Status: DISCONTINUED | OUTPATIENT
Start: 2019-03-18 | End: 2019-03-19 | Stop reason: HOSPADM

## 2019-03-18 RX ORDER — ASPIRIN 81 MG/1
81 TABLET ORAL DAILY
Status: DISCONTINUED | OUTPATIENT
Start: 2019-03-18 | End: 2019-03-18

## 2019-03-18 RX ORDER — LEVOFLOXACIN 500 MG/1
750 TABLET, FILM COATED ORAL DAILY
Status: DISCONTINUED | OUTPATIENT
Start: 2019-03-18 | End: 2019-03-19 | Stop reason: HOSPADM

## 2019-03-18 RX ORDER — ACETAMINOPHEN 325 MG/1
650 TABLET ORAL EVERY 4 HOURS PRN
Status: DISCONTINUED | OUTPATIENT
Start: 2019-03-18 | End: 2019-03-19 | Stop reason: HOSPADM

## 2019-03-18 RX ORDER — CILOSTAZOL 100 MG/1
100 TABLET ORAL 2 TIMES DAILY
Status: CANCELLED | OUTPATIENT
Start: 2019-03-18

## 2019-03-18 RX ORDER — LANOLIN ALCOHOL/MO/W.PET/CERES
3 CREAM (GRAM) TOPICAL NIGHTLY PRN
Status: DISCONTINUED | OUTPATIENT
Start: 2019-03-18 | End: 2019-03-19 | Stop reason: HOSPADM

## 2019-03-18 RX ADMIN — CLOPIDOGREL 75 MG: 75 TABLET, FILM COATED ORAL at 11:35

## 2019-03-18 RX ADMIN — ROSUVASTATIN CALCIUM 20 MG: 20 TABLET, FILM COATED ORAL at 11:36

## 2019-03-18 RX ADMIN — ASPIRIN 81 MG: 81 TABLET, COATED ORAL at 11:36

## 2019-03-18 RX ADMIN — ENOXAPARIN SODIUM 40 MG: 40 INJECTION SUBCUTANEOUS at 11:34

## 2019-03-18 RX ADMIN — ASPIRIN 81 MG 324 MG: 81 TABLET ORAL at 01:26

## 2019-03-18 RX ADMIN — HYDROCODONE BITARTRATE AND ACETAMINOPHEN 1 TABLET: 10; 325 TABLET ORAL at 08:35

## 2019-03-18 RX ADMIN — Medication 10 ML: at 11:34

## 2019-03-18 RX ADMIN — PREGABALIN 150 MG: 75 CAPSULE ORAL at 02:54

## 2019-03-18 RX ADMIN — LORAZEPAM 1 MG: 1 TABLET ORAL at 03:24

## 2019-03-18 RX ADMIN — HYDROCODONE BITARTRATE AND ACETAMINOPHEN 1 TABLET: 10; 325 TABLET ORAL at 16:08

## 2019-03-18 RX ADMIN — TIOTROPIUM BROMIDE INHALATION SPRAY 2 PUFF: 3.12 SPRAY, METERED RESPIRATORY (INHALATION) at 07:58

## 2019-03-18 RX ADMIN — PREGABALIN 150 MG: 75 CAPSULE ORAL at 11:35

## 2019-03-18 RX ADMIN — PREGABALIN 150 MG: 75 CAPSULE ORAL at 20:41

## 2019-03-18 RX ADMIN — LEVOFLOXACIN 750 MG: 500 TABLET, FILM COATED ORAL at 11:35

## 2019-03-18 RX ADMIN — Medication 2 PUFF: at 07:58

## 2019-03-18 RX ADMIN — HYDROCODONE BITARTRATE AND ACETAMINOPHEN 1 TABLET: 10; 325 TABLET ORAL at 01:58

## 2019-03-18 RX ADMIN — Medication 10 ML: at 22:10

## 2019-03-18 RX ADMIN — Medication 1 CAPSULE: at 11:36

## 2019-03-18 RX ADMIN — IOPAMIDOL 75 ML: 755 INJECTION, SOLUTION INTRAVENOUS at 18:00

## 2019-03-18 RX ADMIN — Medication 1 CAPSULE: at 17:47

## 2019-03-18 RX ADMIN — FLUOXETINE 60 MG: 20 CAPSULE ORAL at 11:35

## 2019-03-18 RX ADMIN — LEVOTHYROXINE SODIUM 50 MCG: 25 TABLET ORAL at 06:49

## 2019-03-18 RX ADMIN — HYDROCODONE BITARTRATE AND ACETAMINOPHEN 1 TABLET: 10; 325 TABLET ORAL at 22:10

## 2019-03-18 ASSESSMENT — ENCOUNTER SYMPTOMS
DIARRHEA: 0
ABDOMINAL PAIN: 0
VOMITING: 0
NAUSEA: 0
CHEST TIGHTNESS: 0
SHORTNESS OF BREATH: 0

## 2019-03-18 ASSESSMENT — PAIN SCALES - GENERAL
PAINLEVEL_OUTOF10: 8
PAINLEVEL_OUTOF10: 9
PAINLEVEL_OUTOF10: 5
PAINLEVEL_OUTOF10: 9
PAINLEVEL_OUTOF10: 0
PAINLEVEL_OUTOF10: 5
PAINLEVEL_OUTOF10: 7
PAINLEVEL_OUTOF10: 9
PAINLEVEL_OUTOF10: 7
PAINLEVEL_OUTOF10: 0
PAINLEVEL_OUTOF10: 2
PAINLEVEL_OUTOF10: 8

## 2019-03-18 ASSESSMENT — PAIN DESCRIPTION - ORIENTATION
ORIENTATION: RIGHT;LEFT

## 2019-03-18 ASSESSMENT — PAIN DESCRIPTION - LOCATION
LOCATION: GENERALIZED
LOCATION: FOOT;KNEE
LOCATION: GENERALIZED
LOCATION: FOOT;KNEE
LOCATION: HAND;FOOT

## 2019-03-18 ASSESSMENT — PAIN DESCRIPTION - PAIN TYPE
TYPE: CHRONIC PAIN
TYPE: ACUTE PAIN
TYPE: ACUTE PAIN
TYPE: CHRONIC PAIN

## 2019-03-18 NOTE — CARE COORDINATION
3/18/2019-  Therapy recommending home health care and rolling walker both of which patient declines. States she lives with her daughter who provides good support and will have her purchase walker.

## 2019-03-18 NOTE — ED NOTES
-IV placed per MD order, labs drawn from site per departmental policy. 20G placed Left arm.  -Needle-less high pressure extension placed on line.  -Tubes of blood obtained after verifying patient with name, , and medical record number, using scanning device and pt verification.  -Specimens sent to lab via bio-hazard bag at this time.  -Patient tolerated well and left on stretcher locked in lowest position with side rails up and call light within reach.  -Pt verbalizes understanding of use of call light.        Yajaira Mart RN  [phone]

## 2019-03-18 NOTE — PLAN OF CARE
Problem: Falls - Risk of:  Goal: Will remain free from falls  Description  Will remain free from falls  Outcome: Ongoing   Pt using call light appropriately to alert staff to needs. Call light within reach, bedside table within reach, Fall prevention interventions in place. Problem: Musculor/Skeletal Functional Status  Goal: Absence of falls  Outcome: Ongoing    Pt using call light appropriately to alert staff to needs. Call light within reach, bedside table within reach, Fall prevention interventions in place.

## 2019-03-18 NOTE — ED PROVIDER NOTES
and vomiting. Genitourinary: Negative for dysuria. Musculoskeletal: Positive for neck pain. All other systems reviewed and are negative. Positives and Pertinent negatives as per HPI. Except as noted abovein the ROS, all other systems were reviewed and negative. PAST MEDICAL HISTORY     Past Medical History:   Diagnosis Date    Aortic aneurysm [facility])     monitored by Dr. Margaret Petersen Buerger's disease [facility])     Cancer of vulva ([facility] 2008    microscopic invasive squamous carcinoma vulva    Cataract     COPD (chronic obstructive pulmonary disease) ([facility] [facility]     Emphysema (subcutaneous) (surgical) resulting from a procedure     History of radiation therapy      Completed external beam radiation therapy 04/23/14 total 5000 cGy to the right breast.     Hypertension     Lung bullae ([facility] [address]     monitored by Dr. Izabela Mitchell vascular dis          SURGICAL HISTORY     Past Surgical History:   Procedure Laterality Date    BREAST LUMPECTOMY Right 2/6/14    BREAST SURGERY      tumors removed    BRONCHOSCOPY  01/25/2018    BAL    EYE SURGERY      lasik    JOINT REPLACEMENT Bilateral     right and left KNEE    KNEE ARTHROSCOPY      KNEE SURGERY  9/1/10    REMOVAL OF RETAINED ANTIBIOTIC SPACERS,LEFT KNEE DEBRIDEMENT ANSD SYNOVECTOMY WITH FROZEN SECTION. LEFT KNEE PLACEMENT OF ANTIBIOTIC SPACER LEFT KNEE    OTHER SURGICAL HISTORY  3-2010    subclavian stent left    OTHER SURGICAL HISTORY      stents bilateral femoral arteries    OTHER SURGICAL HISTORY Left 2/16/15    excision of left vulva lesion    OK THORSC DX LUNGS/PERICAR/MED/PLEURAL SPACE W/O BX Left 11/2/2018    LEFT VIDEO ASSISTED THORACOSCOPY, WEDGE RESECTION, MEDIASTINAL LYMPH NODE DISSECTION performed by José Manuel Torres MD at [address]      stent each groin    VULVECTOMY      History of radical vulvectomy with a left inguinal lymph node dissection November 2008.          Tucker Collazo Previous Medications    ADVAIR -21 MCG/ACT INHALER    INHALE 2 PUFFS BY MOUTH TWICE DAILY    ALBUTEROL (PROVENTIL HFA;VENTOLIN HFA) 108 (90 BASE) MCG/ACT INHALER    Inhale 2 puffs into the lungs every 6 hours as needed for Wheezing. ALBUTEROL (PROVENTIL) (2.5 MG/3ML) 0.083% NEBULIZER SOLUTION    Take 3 mLs by nebulization every 6 hours as needed for Wheezing Dx: COPD   ICD-10: J44.9    AMLODIPINE (NORVASC) 5 MG TABLET    Take 5 mg by mouth daily    BISACODYL (DULCOLAX) 5 MG EC TABLET    Take 5 mg by mouth nightly as needed for Constipation. CILOSTAZOL (PLETAL) 50 MG TABLET    Take 100 mg by mouth 2 times daily    CLOPIDOGREL (PLAVIX) 75 MG TABLET    Take 75 mg by mouth daily    EXEMESTANE (AROMASIN) 25 MG CHEMO TABLET    TAKE 1 TABLET BY MOUTH ONCE A DAY FOR BREAST CANCER    FLUOXETINE (PROZAC) 20 MG CAPSULE    Take 3 capsules by mouth daily    FLUTICASONE (FLONASE) 50 MCG/ACT NASAL SPRAY    2 sprays by Nasal route daily. GUAIFENESIN (MUCINEX) 600 MG SR TABLET    Take 800 mg by mouth 3 times daily. HYDROCODONE-ACETAMINOPHEN (NORCO)  MG PER TABLET    Take 1 tablet by mouth every 6 hours as needed for Pain. IBUPROFEN (ADVIL;MOTRIN) 400 MG TABLET    Take 1 tablet by mouth every 6 hours as needed for Pain (incisional)    INCRUSE ELLIPTA 62.5 MCG/INH AEPB    INHALE 1 PUFF BY MOUTH DAILY AS DIRECTED    LEVALBUTEROL (XOPENEX) 0.63 MG/3ML NEBULIZATION    Take 3 mLs by nebulization every 4 hours as needed for Wheezing Dx: COPD   ICD-10: J44.9    LEVOFLOXACIN (LEVAQUIN) 750 MG TABLET    Take 750 mg by mouth daily    LEVOTHYROXINE (SYNTHROID) 50 MCG TABLET    Take 50 mcg by mouth Daily. LISINOPRIL (PRINIVIL;ZESTRIL) 10 MG TABLET    Take 20 mg by mouth daily     LORAZEPAM (ATIVAN) 1 MG TABLET    Take 3 mg by mouth every evening.     OXYGEN    Inhale into the lungs At night prn    POTASSIUM CHLORIDE SA (K-DUR;KLOR-CON) 20 MEQ TABLET    Take 20 mEq by mouth daily     PREGABALIN (LYRICA) 150 MG CAPSULE    Take 150 mg by mouth 2 times daily. ROSUVASTATIN (CRESTOR) 10 MG TABLET    Take 20 mg by mouth daily. ALLERGIES     Codeine and Penicillins    FAMILYHISTORY       Family History   Problem Relation Age of Onset    Cancer Mother         cervical    Stroke Sister     Heart Disease Brother     Breast Cancer Maternal Aunt         x2          SOCIAL HISTORY       Social History     Socioeconomic History    Marital status:       Spouse name: None    Number of children: None    Years of education: None    Highest education level: None   Occupational History    None   Social Needs    Financial resource strain: None    Food insecurity:     Worry: None     Inability: None    Transportation needs:     Medical: None     Non-medical: None   Tobacco Use    Smoking status: Current Every Day Smoker     Packs/day: 2.00     Years: 57.00     Pack years: 114.00     Types: Cigarettes    Smokeless tobacco: Never Used    Tobacco comment: started to smoke at 16 / smoked up to 2 ppd / now smoking 0.50 ppd cigarettes   Substance and Sexual Activity    Alcohol use: No    Drug use: Yes     Comment: marijuana    Sexual activity: None   Lifestyle    Physical activity:     Days per week: None     Minutes per session: None    Stress: None   Relationships    Social connections:     Talks on phone: None     Gets together: None     Attends Caodaism service: None     Active member of club or organization: None     Attends meetings of clubs or organizations: None     Relationship status: None    Intimate partner violence:     Fear of current or ex partner: None     Emotionally abused: None     Physically abused: None     Forced sexual activity: None   Other Topics Concern    None   Social History Narrative    None       SCREENINGS             PHYSICAL EXAM    (up to 7 for level 4, 8 or more for level 5)     ED Triage Vitals [03/17/19 1834]   BP Temp Temp src Pulse Resp SpO2 Height Weight   121/69 98.1 °F (36.7 °C) -- 80 18 95 % -- 162 lb (73.5 kg)       Physical Exam   Constitutional: She is oriented to person, place, and time. She appears well-developed and well-nourished. No distress. HENT:   Head: Normocephalic and atraumatic. Right Ear: External ear normal.   Left Ear: External ear normal.   Eyes: Right eye exhibits no discharge. Left eye exhibits no discharge. Neck: Normal range of motion. Neck supple. No JVD present. Cardiovascular: Normal rate and regular rhythm. Exam reveals no friction rub. No murmur heard. Pulmonary/Chest: Effort normal and breath sounds normal. No stridor. No respiratory distress. She has no wheezes. Abdominal: Soft. She exhibits no mass. There is no tenderness. Musculoskeletal: Normal range of motion. Neurological: She is alert and oriented to person, place, and time. A sensory deficit is present. No cranial nerve deficit. GCS eye subscore is 4. GCS verbal subscore is 5. GCS motor subscore is 6. Reflex Scores:       Bicep reflexes are 2+ on the right side and 2+ on the left side. Patellar reflexes are 2+ on the right side and 2+ on the left side. Describes numbness but she has normal sensation to pain and light touch   Skin: Skin is warm and dry. She is not diaphoretic. No pallor. Psychiatric: She has a normal mood and affect. Her behavior is normal.   Nursing note and vitals reviewed.       DIAGNOSTIC RESULTS   LABS:    Labs Reviewed   COMPREHENSIVE METABOLIC PANEL - Abnormal; Notable for the following components:       Result Value    Glucose 135 (*)     ALT 9 (*)     AST 10 (*)     All other components within normal limits    Narrative:     Performed at:  [facility] [address], [place], [address] [facility]   Phone [phone]   CBC WITH AUTO DIFFERENTIAL    Narrative:     Performed at:  [facility]  [address] [facility]   Phone [phone]       All other labs were within normal range or not returned as of this dictation. EKG: All EKG's are interpreted by the Emergency Department Physician who either signs orCo-signs this chart in the absence of a cardiologist.  Please see their note for interpretation of EKG. RADIOLOGY:   Non-plain film images such as CT, Ultrasound and MRI are read by the radiologist. Plain radiographic images are visualized andpreliminarily interpreted by the  ED Provider with the below findings:        Interpretation perthe Radiologist below, if available at the time of this note:    XR CHEST PORTABLE   Final Result   No acute cardiopulmonary process. Mild cardiomegaly         CT Head WO Contrast   Final Result   1. No acute intracranial abnormality. 2. Evidence of sequela from lacunar stroke left basal ganglia and thalamus,   very likely remote. 3. White matter hypoattenuation described is typical of microvascular   ischemic disease or as sequela of dysmyelinating/demyelinating processes. 4.  Vascular calcifications are noted reflecting calcific atherosclerosis. 5. Possible acute right maxillary sinusitis. Nonspecific partial   opacification right maxillary sinus. No results found. PROCEDURES   Unless otherwise noted below, none     Procedures    CRITICAL CARE TIME   The total critical care time spent while evaluating and treating this patient was at least 65 minutes. This excludes time spent doing separately billable procedures. This includes time at the bedside, data interpretation, medication management, obtaining critical history from collateral sources if the patient is unable to provide it directly, and physician consultation. Specifics of interventions taken and potentially life-threatening diagnostic considerations are listed above in the medical decision making.       CONSULTS:  IP CONSULT TO HOSPITALIST      EMERGENCY DEPARTMENT COURSE and DIFFERENTIALDIAGNOSIS/MDM:   Vitals:    Vitals:    03/17/19 1834 03/17/19 lacunar stroke left basal ganglia and thalamus,  very likely remote. 3. White matter hypoattenuation described is typical of microvascular  ischemic disease or as sequela of dysmyelinating/demyelinating processes. 4.  Vascular calcifications are noted reflecting calcific atherosclerosis. 5. Possible acute right maxillary sinusitis. Nonspecific partial  opacification right maxillary sinus. Aspirin given in ED. Impression:    No acute cardiopulmonary process.  Mild cardiomegaly        hospitalist consulted regarding admission of this patient. FINAL IMPRESSION      1. Numbness and tingling    2. Unable to walk    3. Generalized weakness    4. Lacunar stroke    5.  Smoker          DISPOSITION/PLAN   DISPOSITION Decision To Admit 03/18/2019 12:55:50 AM      PATIENT REFERREDTO:  Agustín López MD  [place] [phone]    Schedule an appointment as soon as possible for a visit       [facility] Emergency Department  [address] [place] [address]  [phone]    If symptoms worsen    Gigi Truong MD  [address] [place] [address] [phone]    Schedule an appointment as soon as possible for a visit   call tomorrow for appointment, please call your primary care doctor first      DISCHARGE MEDICATIONS:  New Prescriptions    No medications on file       DISCONTINUED MEDICATIONS:  Discontinued Medications    No medications on file              (Please note that portions ofthis note were completed with a voice recognition program.  Efforts were made to edit the dictations but occasionally words are mis-transcribed.)    SUSIE Restrepo CNP (electronically signed)           SUSIE Restrepo CNP  03/17/19 [id number] [facility], APRN - CNP  03/18/19 0101

## 2019-03-18 NOTE — ED NOTES
Patient was caught trying to leave in wheelchair out squad doors, explained to patient that it was not our policy to allow patient's to leave to go smoke and come back in, patient returned to room and Dr. Asaf Medellin informed     Yris Read RN  03/18/19 1090

## 2019-03-18 NOTE — PROGRESS NOTES
Occupational Therapy   Occupational Therapy Initial Assessment  Date: 3/18/2019   Patient Name: Burton Chatman  MRN: [id number]     : 1945    Date of Service: 3/18/2019    Discharge Recommendations:  [address] scored a  on the AM-PAC ADL Inpatient form. Current research shows that an AM-PAC score of 18 or greater is typically associated with a discharge to the patient's home setting. Based on the patients AM-PAC score and their current ADL deficits, it is recommended that the patient have 2-3 sessions per week of Occupational Therapy at d/c to increase the patients independence. Assessment   Performance deficits / Impairments: Decreased functional mobility ; Decreased ADL status; Decreased balance;Decreased endurance;Decreased high-level IADLs  Treatment Diagnosis: decreased independence with ADL related to chronic back pain  Prognosis: Good  Decision Making: Medium Complexity  Exam: ADL, cognition, transfers, mobility  Assistance / Modification: rw, physical assist  Patient Education: OT evaluation, plan of care, DC  Barriers to Learning: pain  REQUIRES OT FOLLOW UP: Yes  Activity Tolerance  Activity Tolerance: Patient limited by fatigue;Patient limited by pain           Patient Diagnosis(es): The primary encounter diagnosis was Numbness and tingling. Diagnoses of Unable to walk, Generalized weakness, Lacunar stroke, and Smoker were also pertinent to this visit. has a past medical history of Aortic aneurysm ([address] Arthritis, Buerger's disease ([address] Cancer of vulva ([address] Cataract, COPD (chronic obstructive pulmonary disease) ([address] Emphysema (subcutaneous) (surgical) resulting from a procedure, History of radiation therapy, Hypertension, Lung bullae ([address] and Periph vascular dis.    has a past surgical history that includes Breast surgery; Knee arthroscopy; Vulvectomy (); other surgical history (3-); eye surgery; knee surgery (9/1/10); vascular surgery; Breast Doors  Bathroom Toilet: Standard  Bathroom Accessibility: Walker accessible  Home Equipment: Black Raven and Stag  ADL Assistance: Independent  Homemaking Assistance: (pt helps with washing dishes)  Homemaking Responsibilities: No  Ambulation Assistance: Independent(cane when walking in yard)  Transfer Assistance: Independent  Active : Yes  Additional Comments: No falls in last 6 months. Dtg is home during the day (doesn't work).          Objective   Vision: Impaired  Vision Exceptions: Wears glasses for reading  Hearing: Within functional limits    Orientation  Overall Orientation Status: Within Normal Limits  Observation/Palpation  Posture: Fair(fwd head, rounded shoulders)  Palpation: tenderness to light touch generalized   Observation: kyphosis with difficulty performing scapular retraction, cervical ROM limited by pain   Balance  Sitting Balance: Independent  Standing Balance: Stand by assistance  Standing Balance  Time: 4-5 minutes  Activity: ambulation in room with rw  Sit to stand: Contact guard assistance  Stand to sit: Contact guard assistance  Functional Mobility  Functional - Mobility Device: Rolling Walker  Activity: To/from bathroom  Assist Level: Stand by assistance  Wheelchair Bed Transfers  Wheelchair/Bed - Technique: Ambulating  Level of Asssistance: Stand by assistance  Wheelchair Transfers Comments: stand by/ contact guard with rw  ADL  Grooming: Stand by assistance  LE Bathing: Supervision;Setup  Additional Comments: ambulated to bathroom, completed oral hygine at sink in stand, stood about 4 minutes, lower body dress completed in short sit at edge of bed  Tone RUE  RUE Tone: Normotonic  Tone LUE  LUE Tone: Normotonic  Coordination  Movements Are Fluid And Coordinated: Yes        Transfers  Sit to stand: Contact guard assistance  Stand to sit: Contact guard assistance     Cognition  Overall Cognitive Status: WNL        Sensation  Overall Sensation Status: WFL(to light touch but reports

## 2019-03-18 NOTE — PROGRESS NOTES
Talked to pt about exemestane medication and pt stated her sister will bring it in. Instructed pt to give medication to staff when sister brings it in.

## 2019-03-18 NOTE — ED PROVIDER NOTES
I saw this patient in conjunction with Abel Wallace the nurse practitioner. This patient initially told me that she has some difficulty walking because she was holding onto things that she felt unsteady. On physical examination, she had good strength and we did not find any focal neurologic deficit and she was to be discharged home, but at the time of discharge she told me that she couldn't walk and was getting progressively weaker over the past week. This was news to me at this point and I thought the disposition is to be changed so pineda I talked about it and we decided to admit the patient for further workup. The patient stated that she wanted to go outside and smoke and eventually had some type of interaction with the nurse concerning this and I don't think the patient was pleased. At some point, she left the department but apparently returned    I personally evaluated and examined the patient in conjunction with the APC and agree with the assessment, treatment plan and disposition of the patient has recorded by the APC. I reviewed pertinent nurse's notes, triage notes, vital signs, past medical history, family and social history, medications, and allergies. Complete review of systems was conducted by the mid-level provider and/or myself. Review of systems is negative except as documented in the history of present illness. EKG:      FINAL IMPRESSION     1. Numbness and tingling    2. Unable to walk            Electronically signed by: MARYCHUY De Dios MD  03/18/19 0111

## 2019-03-18 NOTE — ED NOTES
Patient again wanted to leave and go smoke, CT tech then spoke to patient in hallway about going to CT scan, Dr. Kelley Cuevas informed again of patient wanting to leave      Fernanda Saeed RN  03/18/19 1093

## 2019-03-18 NOTE — CONSULTS
In patient Neurology consult        [facility] Neurology      MD Diamond Can  1945    Date of Service: 3/18/2019    Referring Physician: Cayetano Chinchilla MD      Reason for the consult: acute left sided numbness. HPI:   The patient is a 69y.o.  years old female with history of chronic cervical spinal stenosis, hypertension and COPD who was admitted to the hospital yesterday with acute left-sided paresthesia and unsteady gait. Symptoms started few days ago. She has chronic history of cervical spinal stenosis and had 2 epidural injections few days ago. After the second injection, she started having some numbness and tingling affecting her left arm and leg. Symptoms were persistent and daily. Degree was moderate. No triggers. No weakness, headache, double vision or blurred vision, dysphagia or dysarthria. No trauma or injury. No bladder or bowel issues. No other associated symptoms 7 feeling slightly unsteady. No trauma or passing out. She decided to come to the hospital where she was admitted yesterday. There was initial concern of possible new ischemic stroke. She had MRI of the brain which showed chronic small vessel disease but no acute stroke. Last MRI of the C-spine was 18 days ago which showed multilevel disc protrusion and spinal stenosis. She was on ASA and Plavix at some point which were discontinued before her epidural injections. Today she denies any new symptoms. She is currently on aspirin and Plavix. Other review of system was unremarkable.           Past Medical History:   Diagnosis Date    Aortic aneurysm [facility])     monitored by Dr. Martino Net disease [facility])     [facility] 2008    microscopic invasive squamous carcinoma vulva    Cataract     COPD [facility]ch[facility] obstructive [facility] disease) [facility]     Emphysema (subcutaneous) (surgical) resulting from a procedure     History of radiation therapy      Completed external beam radiation therapy 04/23/14 total 5000 cGy to the right breast.     Hypertension     Lung bullae ([address]     monitored by Dr. Chanelle Rodriges vascular dis      Family History   Problem Relation Age of Onset    Cancer Mother         cervical    Stroke Sister     Heart Disease Brother     Breast Cancer Maternal Aunt         x2     Past Surgical History:   Procedure Laterality Date    BREAST LUMPECTOMY Right 2/6/14    BREAST SURGERY      tumors removed    BRONCHOSCOPY  01/25/2018    BAL    EYE SURGERY      lasik    JOINT REPLACEMENT Bilateral     right and left KNEE    KNEE ARTHROSCOPY      KNEE SURGERY  9/1/10    REMOVAL OF RETAINED ANTIBIOTIC SPACERS,LEFT KNEE DEBRIDEMENT ANSD SYNOVECTOMY WITH FROZEN SECTION. LEFT KNEE PLACEMENT OF ANTIBIOTIC SPACER LEFT KNEE    OTHER SURGICAL HISTORY  3-2010    subclavian stent left    OTHER SURGICAL HISTORY      stents bilateral femoral arteries    OTHER SURGICAL HISTORY Left 2/16/15    excision of left vulva lesion    CA THORSC DX LUNGS/PERICAR/MED/PLEURAL SPACE W/O BX Left 11/2/2018    LEFT VIDEO ASSISTED THORACOSCOPY, WEDGE RESECTION, MEDIASTINAL LYMPH NODE DISSECTION performed by Michelle Jaquez MD at [address]      stent each groin    VULVECTOMY      History of radical vulvectomy with a left inguinal lymph node dissection November 2008. Past Surgical History:   Procedure Laterality Date    BREAST LUMPECTOMY Right 2/6/14    BREAST SURGERY      tumors removed    BRONCHOSCOPY  01/25/2018    BAL    EYE SURGERY      lasik    JOINT REPLACEMENT Bilateral     right and left KNEE    KNEE ARTHROSCOPY      KNEE SURGERY  9/1/10    REMOVAL OF RETAINED ANTIBIOTIC SPACERS,LEFT KNEE DEBRIDEMENT ANSD SYNOVECTOMY WITH FROZEN SECTION.  LEFT KNEE PLACEMENT OF ANTIBIOTIC SPACER LEFT KNEE    OTHER SURGICAL HISTORY  3-2010    subclavian stent left    OTHER SURGICAL HISTORY      stents bilateral femoral arteries    OTHER SURGICAL HISTORY (SYNTHROID) tablet 50 mcg  50 mcg Oral Daily Elsa Suarez PA-C   50 mcg at 03/18/19 7483    pregabalin (LYRICA) capsule 150 mg  150 mg Oral BID Elsa Suarez PA-C   150 mg at 03/18/19 0254    rosuvastatin (CRESTOR) tablet 20 mg  20 mg Oral Daily Elsa Suarez PA-C        sodium chloride flush 0.9 % injection 10 mL  10 mL Intravenous 2 times per day Elsa Suarez PA-C        sodium chloride flush 0.9 % injection 10 mL  10 mL Intravenous PRN Elsa Suarez PA-C        magnesium hydroxide (MILK OF MAGNESIA) 400 MG/5ML suspension 30 mL  30 mL Oral Daily PRN Elsa Suarez PA-C        [facility] injection 4 mg  4 mg Intravenous Q6H PRN Elsa Suarez PA-C        enoxaparin (LOVENOX) injection 40 mg  40 mg Subcutaneous Daily Bhavani Magaña PA-C        acetaminophen (TYLENOL) tablet 650 mg  650 mg Oral Q4H PRN Elsa Suarez PA-C        aspirin EC tablet 81 mg  81 mg Oral Daily Elsa Suarez PA-C        labetalol (NORMODYNE;TRANDATE) injection 10 mg  10 mg Intravenous Q10 Min PRN Elsa Suarez PA-C        [START ON 3/19/2019] nicotine (NICODERM CQ) 21 MG/24HR 1 patch  1 patch Transdermal Daily Bhavani Magaña PA-C        levofloxacin (LEVAQUIN) tablet 750 mg  750 mg Oral Daily Bhavani Magaña PA-C        tiotropium (SPIRIVA RESPIMAT) 2.5 MCG/ACT inhaler 2 puff  2 puff Inhalation Daily Speedy Pollock MD   2 puff at 03/18/19 2468    melatonin tablet 3 mg  3 mg Oral Nightly PRN Elsa Suarez PA-C        lactobacillus (CULTURELLE) capsule 1 capsule  1 capsule Oral BID  Bhavani Magaña PA-C           ROS : A 10-12 system review of constitutional, cardiovascular, respiratory, musculoskeletal, endocrine, skin, hematological, SHEENT, genitourinary, psychiatric and neurologic systems was obtained and updated today and is unremarkable except as mentioned in my HPI      Exam:     Constitutional:   Vitals:    03/18/19 0230 03/18/19 4681 03/18/19 0758 03/18/19 1039   BP:  (!) 144/81  135/86   Pulse:  80  65   Resp:  20 16 14   Temp:  96.9 °F (36.1 °C)  98.4 °F (36.9 °C)   TempSrc:  Temporal  Temporal   SpO2:  90% 90% 94%   Weight: 163 lb (73.9 kg)      Height: 5' 5\" (1.651 m)          General appearance:  Normal development and appear in no acute distress. Eye: No icterus. Fundus: No blurring of optic disc. Neck: supple  Cardiovascular: No carotid bruit. No lower leg edema with good pulsation. Mental Status:   Oriented to person, place, problem, and time. Memory: Aware of recent and remote event. Good immediate recall. Intact remote memory  Normal attention span and concentration. Language: intact naming, repeating and fluency   Good fund of Knowledge. Aware of current events and vocabulary   Cranial Nerves:   II: Visual fields: Full to confrontation and nl [facility]. Pupils: equal, round, reactive to light  III,IV,VI: Extra Ocular Movements are intact. No nystagmus  V: Facial sensation is intact to pin prick and light touch  VII: Facial strength and movements: intact and symmetric  VIII: Hearing: Intact to finger rub bilaterally  IX: Palate elevation is symmetric  XI: Shoulder shrug is intact  XII: Tongue movements are normal  Musculoskeletal: 5/5 in all 4 extremities. Tone: Normal tone. Reflexes: Bilateral biceps 2/4, triceps 2/4, brachial radialis 2/4, knee 3/4 and ankle 3/4. Planters: flexor bilaterally. Coordination: no pronator drift, no dysmetria with FNF in upper extremities. Normal REM. Sensation: normal to all modalities in both arms and legs. Gait/Posture: steady gait and normal posturing and station.      Data:  LABS:   Lab Results   Component Value Date     03/18/2019    K 3.7 03/18/2019    K 4.1 02/05/2018     03/18/2019    CO2 26 03/18/2019    BUN 7 03/18/2019    CREATININE 0.8 03/18/2019    GFRAA >60 03/18/2019    GFRAA >60 06/30/2012    LABGLOM >60 03/18/2019    GLUCOSE 135 03/18/2019    PHOS 3.3 11/03/2018    MG 1.80 11/03/2018    CALCIUM 8.5 03/18/2019     Lab Results   Component Value Date    WBC 10.4 03/18/2019    RBC 4.81 03/18/2019    RBC 4.43 01/27/2017    HGB 15.0 03/18/2019    HCT 44.9 03/18/2019    MCV 93.3 03/18/2019    RDW 14.8 03/18/2019     03/18/2019     Lab Results   Component Value Date    INR 0.97 10/26/2018    PROTIME 11.1 10/26/2018       Neuroimaging and/or  labs reviewed by me and discussed results with the patient    Impression:  New left sided paresthesia in a patient with history of spinal stenosis and recent cervical epidural injection. So far no specific etiology. Will get MRI of the C-spine to rule out epidural hematoma or other complication from recent epidural injections although seems less likely. Recent MRI of the brain showed no acute stroke. Chronic cervical spinal stenosis and myelopathy  Hypertension  Hyperlipidemia  Depression  Chronic pain  COPD  Smoking     Recommendation:  MRI of the C-spine  Hold AP therapy till MRI results. Restart aspirin and Plavix if MRI C-spine showed no evidence of epidural hematoma. PT and OT  Statin  Telemetry  Continue current pain control  Blood pressure monitor  SSRI  neuro checks  respiratory support   Nicotine patch  Continue home Synthroid  Will follow         Thank you for referring such patient. If you have any questions regarding my consult note, please don't hesitate to call me. Rowan Stern MD  [phone]    This dictation was generated by voice recognition computer software.  Although all attempts are made to edit the dictation for accuracy, there may be errors in the  transcription that are not intended

## 2019-03-18 NOTE — ED NOTES
Report called to [facility], RN verbalized understanding and denied any need for further information, patient was taken to unit at this time      Kyaw Bhagat RN  03/18/19 6729

## 2019-03-18 NOTE — H&P
[facility] Medicine History & Physical      Patient Name: Kyaw Vega    : 1945    PCP: Ace Cordova MD    Date of Service:  Patient seen and examined on 3/18/2018     Chief Complaint:  Generalized weakness and unsteady gait    History Of Present Illness:    Kyaw Vega is a 69 y.o. female with a PMH of COPD, breast cancer, hypothryoidism, HLD, HTN who presented to ED with complaint of generalized weakness and unsteady gait. She reports these symptoms started after a cervical injection on Tuesday. She reports symptoms have progressively worsened and that today she is unable to walk due to weakness. She denies fever, chills, dizziness, changes in vision, chest pain, shortness of breath, cough, edema, abdominal pain, N/V/D/C, pain with urination, urinary retention, urinary or fecal incontinence. Patient is a smoker. She reports injection was for neck pain and was the 2nd one she received. She states after first injection, she had numbness and tingling to bilateral arms and legs but no weakness. An MRI was completed before patient received the 2nd injection. She also reports she was started on levaquin on Monday for lung infection and has 3 days remaining. CXR impression from 3/11/19 reviewed which shows no significant acute abnormality. Workup initiated in ED. Patient initially failed to mention that she was having difficulty walking so clinicians were working under the impression that her only issue was numbness/tingling in her arms/legs. After she mentioned the issues with ambulation due to weakness and unsteady gait x 5 days, they recommended patient be admitted for further evaluation. CT head with nonspecific findings and acute process cannot be ruled out. Patient did leave the ER briefly to go out to smoke but came back in. CXR negative. No leukocytosis, electrolyte abnormalities or anemia.       Past Medical History:    Patient  has a past medical history of Aortic aneurysm ([facility] Arthritis, Buerger's disease ([facility] Cancer of vulva ([facility] Cataract, COPD (chronic obstructive pulmonary disease) ([facility] Emphysema (subcutaneous) (surgical) resulting from a procedure, History of radiation therapy, Hypertension, Lung bullae ([facility] and Periph vascular dis. Past Surgical History:    Patient  has a past surgical history that includes Breast surgery; Knee arthroscopy; Vulvectomy (); other surgical history (3-2010); eye surgery; knee surgery (9/1/10); vascular surgery; Breast lumpectomy (Right, 2/6/14); other surgical history; other surgical history (Left, 2/16/15); joint replacement (Bilateral); bronchoscopy (01/25/2018); and pr thorsc dx lungs/pericar/med/pleural space w/o bx (Left, 11/2/2018). Medications Prior to Admission:      Prior to Admission medications    Medication Sig Start Date End Date Taking?  Authorizing Provider   levofloxacin (LEVAQUIN) 750 MG tablet Take 750 mg by mouth daily   Yes Historical Provider, MD   INCRUSE ELLIPTA 62.5 MCG/INH AEPB INHALE 1 PUFF BY MOUTH DAILY AS DIRECTED 2/11/19  Yes Read MD Tari   albuterol (PROVENTIL) (2.5 MG/3ML) 0.083% nebulizer solution Take 3 mLs by nebulization every 6 hours as needed for Wheezing Dx: COPD   ICD-10: J44.9 12/12/18  Yes Read MD Tari   levalbuterol (XOPENEX) 0.63 MG/3ML nebulization Take 3 mLs by nebulization every 4 hours as needed for Wheezing Dx: COPD   ICD-10: J44.9 12/3/18  Yes Obi Marc MD   ibuprofen (ADVIL;MOTRIN) 400 MG tablet Take 1 tablet by mouth every 6 hours as needed for Pain (incisional) 11/6/18  Yes SUSIE Haines CNP   ADVAIR -21 MCG/ACT inhaler INHALE 2 PUFFS BY MOUTH TWICE DAILY 10/31/18  Yes Read MD Tari   amLODIPine (NORVASC) 5 MG tablet Take 5 mg by mouth daily   Yes Historical Provider, MD   exemestane (AROMASIN) 25 MG chemo tablet TAKE 1 TABLET BY MOUTH ONCE A DAY FOR BREAST CANCER 5/12/17  Yes Moisés Mcneill MD   clopidogrel (PLAVIX) 75 MG tablet Take 75 mg by mouth follows:        Problem Relation Age of Onset    Cancer Mother         cervical    Stroke Sister     Heart Disease Brother     Breast Cancer Maternal Aunt         x2       REVIEW OF SYSTEMS:   Pertinent positives as noted in the HPI. All other systems reviewed and negative. PHYSICAL EXAM PERFORMED:    BP (!) 161/80   Pulse 68   Temp 97.2 °F (36.2 °C) (Temporal)   Resp 16   Ht 5' 5\" (1.651 m)   Wt 163 lb (73.9 kg)   LMP 03/11/1991 (Approximate) Comment: menarche 7years old  SpO2 95%   BMI 27.12 kg/m²     General appearance:  Awake, alert, no apparent distress  HEENT:  Normocephalic, atraumatic without obvious deformity. PERRL. EOM intact. Conjunctivae/corneas clear. Neck: Supple, with full range of motion. No JVD. Trachea midline. Respiratory:  Clear to auscultation bilaterally without rales, wheezes, or rhonchi. Normal respiratory effort. Cardiovascular:  Regular rate and rhythm without murmurs, rubs or gallops. Abdomen: Soft, NT, ND, without rebound or guarding. Normal bowel sounds. Extremities:  No clubbing, cyanosis, or edema bilaterally. Full range of motion without deformity. +2 palpable pulses, equal bilaterally. Capillary refill brisk,< 3 seconds   Skin: No rashes or lesions. Warm/dry. Neurologic:  Possibly slight RLE weakness, inconsistent results. Neurovascularly intact without any focal sensory/motor deficits. Cranial nerves: II-XII intact, grossly non-focal. Alert and oriented x 3. No slurred speech. No gross facial droop. Psychiatric:  Thought content appropriate, normal insight. Labs:   CBC   Recent Labs     03/18/19  0028   WBC 10.4   HGB 15.0   HCT 44.9         RENAL  Recent Labs     03/18/19  0028      K 3.7      CO2 26   BUN 7   CREATININE 0.8     LFTS  Recent Labs     03/18/19  0028   AST 10*   ALT 9*   BILITOT <0.2   ALKPHOS 96     COAG  No results for input(s): INR in the last 72 hours.   CARDIAC ENZYMES  No results for input(s): TROPONINI in the last 72 hours. LIPIDS  Cholesterol, Total   Date/Time Value Ref Range Status   06/15/2015 01:45  0 - 199 mg/dL Final     Triglycerides   Date/Time Value Ref Range Status   06/15/2015 01:45  0 - 150 mg/dL Final     HDL   Date/Time Value Ref Range Status   06/15/2015 01:45 PM 35 (L) 40 - 60 mg/dL Final   05/12/2011 10:57 AM 38 (L) 40 - 60 mg/dl Final     LDL Calculated   Date/Time Value Ref Range Status   06/15/2015 01:45 PM 83 <100 mg/dL Final       Radiology:     XR CHEST PORTABLE   Final Result   No acute cardiopulmonary process. Mild cardiomegaly         CT Head WO Contrast   Final Result   1. No acute intracranial abnormality. 2. Evidence of sequela from lacunar stroke left basal ganglia and thalamus,   very likely remote. 3. White matter hypoattenuation described is typical of microvascular   ischemic disease or as sequela of dysmyelinating/demyelinating processes. 4.  Vascular calcifications are noted reflecting calcific atherosclerosis. 5. Possible acute right maxillary sinusitis. Nonspecific partial   opacification right maxillary sinus. CTA HEAD W CONTRAST    (Results Pending)   CTA NECK W CONTRAST    (Results Pending)   MRI brain without contrast    (Results Pending)         ASSESSMENT/PLAN:    Generalized weakness  Possible TIA/CVA. Out of the tPA window  Head CT with nonspecific changes  CTA head/neck and MRI ordered  ASA, statin  Neurology consulted  Check lipids, HbA1c  Allow permissive HTN, SBP < 220, DBP < 110  PT/OT consulted    Tobacco dependence  Counseled on cessation  Nicotine patch ordered    HTN  Hold home meds to allow for permissive HTN  Labetalol PRN ordered    HLD  Continue home statin    Hypothyroidism  Continue home synthroid   Check TSH; most recent TSH was 1.39 on 4/3/2017    Hx of breast cancer  Continue home aromasin    COPD  Without acute exacerbation.    Continue home meds  She is on levaquin for COPD exacerbation/bronchitis but currently asymptomatic. Continue levaquin to complete course (3/20/19)    Chronic pain syndrome  Continue home lyrica and percocet. OARRS reviewed      DVT prophylaxis: Lovenox  GI prophylaxis: Not indicated  Probiotic if on abx: Yes    Diet: DIET GENERAL;  Code Status: Full Code    Consults:  IP CONSULT TO HOSPITALIST  IP CONSULT TO NEUROLOGY    Disposition: Admit to Inpatient   ELOS: Greater than two midnights due to medical therapy     Kisha Purdy PA-C    Thank you Genevieve Hoyt MD for the opportunity to be involved in this patient's care. If you have any questions or concerns please feel free to contact me at [phone].

## 2019-03-18 NOTE — PROGRESS NOTES
Physical Therapy    Facility/Department: [address]  Initial Assessment    NAME: January Randle  : 1945  MRN: [id number]    Date of Service: 3/18/2019    Discharge Recommendations:  January Randle scored a 19/24 on the AM-PAC short mobility form. Current research shows that an AM-PAC score of 18 or greater is typically associated with a discharge to the patient's home setting. Based on the patients AM-PAC score and their current functional mobility deficits, it is recommended that the patient have 2-3 sessions per week of Physical Therapy at d/c to increase the patients independence. HOME HEALTH CARE: LEVEL 3 SAFETY     - Initial home health evaluation to occur within 24-48 hours, in patient home   - Therapy evaluations in home within 24-48 hours of discharge; including DME and home safety   - Frontload therapy 5 days, then 3x a week   - Therapy to evaluate if patient has [address] needs for personal care   -  evaluation within 24-48 hours, includes evaluation of resources and insurance to determine [place], [place], LTC, and Medicaid options         PT Equipment Recommendations  Equipment Needed: Yes  Mobility Devices: Ivy Cornell: Rolling  Other: Pt would benefit from use of RW to improve safety with ambulation         Patient Diagnosis(es): The primary encounter diagnosis was Numbness and tingling. Diagnoses of Unable to walk, Generalized weakness, Lacunar stroke, and Smoker were also pertinent to this visit. has a past medical history of Aortic aneurysm ([address] Arthritis, Buerger's disease ([address] Cancer of vulva ([address] Cataract, COPD (chronic obstructive pulmonary disease) ([address] Emphysema (subcutaneous) (surgical) resulting from a procedure, History of radiation therapy, Hypertension, Lung bullae ([address] and Periph vascular dis.    has a past surgical history that includes Breast surgery; Knee arthroscopy; Vulvectomy (-); other surgical history (3-); eye surgery; knee surgery (9/1/10); vascular surgery; Breast lumpectomy (Right, 2/6/14); other surgical history; other surgical history (Left, 2/16/15); joint replacement (Bilateral); bronchoscopy (01/25/2018); and pr thorsc dx lungs/pericar/med/pleural space w/o bx (Left, 11/2/2018). Restrictions  Restrictions/Precautions  Restrictions/Precautions: Fall Risk(high fall risk)  Position Activity Restriction  Other position/activity restrictions: Ryan Bob is a 69 y.o. female presents to the emergency department complaining of chronic pain in her neck with numbness and tingling to bilateral arms and legs. She reports that this occurred after her first injection, an MRI was completed following the injection and complaint of symptoms and then second injection completed Tuesday of last week. Patient reports that she's having difficulty with her gait. She describes numbness present without focal weakness. She has no saddle anesthesia, urinary retention or fecal incontinence.   Vision/Hearing  Vision: Impaired  Vision Exceptions: Wears glasses for reading  Hearing: Within functional limits     Subjective  General  Chart Reviewed: Yes  Family / Caregiver Present: No  Diagnosis: generalized weakness   General Comment  Comments: Nsg notified for pain medication, supine in bed at arrival   Subjective  Subjective: agreed to evaluation with encouragement and explanation as to why evaluations were ordered  Pain Screening  Patient Currently in Pain: Yes  Pain Assessment  Pain Assessment: 0-10  Pain Level: 9  Pain Type: Acute pain  Pain Location: Generalized  Vital Signs  Patient Currently in Pain: Yes       Orientation  Orientation  Overall Orientation Status: Within Functional Limits  Social/Functional History  Social/Functional History  Lives With: Daughter  Type of Home: House  Home Layout: One level, Laundry in basement(pt does not go down to basement)  Home Access: Stairs to enter without rails  Entrance Stairs - Number of Steps: 2  Bathroom Shower/Tub: Walk-in shower, Shower chair with back, Doors  Bathroom Toilet: Standard  Bathroom Accessibility: Walker accessible  Home Equipment: [employer]  ADL Assistance: [address]: (pt helps with washing dishes)  Homemaking Responsibilities: No  Ambulation Assistance: Independent(cane when walking in yard)  Transfer Assistance: Independent  Active : Yes  Additional Comments: No falls in last 6 months. Dtg is home during the day (doesn't work).     Cognition        Objective     Observation/Palpation  Posture: Fair(fwd head, rounded shoulders)  Palpation: tenderness to light touch generalized   Observation: kyphosis with difficulty performing scapular retraction, cervical ROM limited by pain     AROM RLE (degrees)  RLE AROM: WFL  RLE General AROM: moves LEs slowly through range   AROM LLE (degrees)  LLE AROM : WFL  LLE General AROM: moves LEs slowly through range   Strength RLE  Strength RLE: Exception  Comment: hip flex 3+/5, quad and HS 3+/5, ankle PF 4/5, ankle DF 4-/5    Strength LLE  Strength LLE: Exception  Comment: hip flex 4-/5, quad and HS 4-/5, ankle PF 4/5, ankle DF 4-/5       Sensation  Overall Sensation Status: WFL(to light touch but reports N/T in feet)  Bed mobility  Supine to Sit: Stand by assistance(HOB elevated)  Scooting: Stand by assistance(towards EOB)  Transfers  Sit to Stand: Contact guard assistance(vc for hand placement)  Stand to sit: Contact guard assistance  Ambulation  Ambulation?: Yes  Ambulation 1  Surface: level tile  Device: Rolling Walker  Assistance: Contact guard assistance  Quality of Gait: heavy reliance of UEs on RW with slight fwd flexed posture, slow nelia with small step length, pt reporting knees buckling but not observed by PT   Distance: 30 ft   Comments: distance limited by pain   Stairs/Curb  Stairs?: No     Balance  Posture: Fair  Sitting - Static: Good  Sitting - Dynamic: Good  Standing - Static: Good;-(with UE

## 2019-03-18 NOTE — ED NOTES
Patient back to room after CT scan and again told me she was going outside.  Patient left in wheelchair to go outside, Dr. Levar Sawyer again informed      Gonzalez Barraza RN  03/18/19 [id number]

## 2019-03-19 VITALS
WEIGHT: 160 LBS | TEMPERATURE: 98.6 F | DIASTOLIC BLOOD PRESSURE: 87 MMHG | RESPIRATION RATE: 16 BRPM | SYSTOLIC BLOOD PRESSURE: 137 MMHG | HEART RATE: 64 BPM | BODY MASS INDEX: 26.66 KG/M2 | OXYGEN SATURATION: 91 % | HEIGHT: 65 IN

## 2019-03-19 LAB
ANION GAP SERPL CALCULATED.3IONS-SCNC: 9 MMOL/L (ref 3–16)
BUN BLDV-MCNC: 13 MG/DL (ref 7–20)
CALCIUM SERPL-MCNC: 8.2 MG/DL (ref 8.3–10.6)
CHLORIDE BLD-SCNC: 105 MMOL/L (ref 99–110)
CHOLESTEROL, TOTAL: 129 MG/DL (ref 0–199)
CO2: 26 MMOL/L (ref 21–32)
CREAT SERPL-MCNC: 1 MG/DL (ref 0.6–1.2)
GFR AFRICAN AMERICAN: >60
GFR NON-AFRICAN AMERICAN: 54
GLUCOSE BLD-MCNC: 107 MG/DL (ref 70–99)
HCT VFR BLD CALC: 43 % (ref 36–48)
HDLC SERPL-MCNC: 36 MG/DL (ref 40–60)
HEMOGLOBIN: 14.1 G/DL (ref 12–16)
LDL CHOLESTEROL CALCULATED: 76 MG/DL
MCH RBC QN AUTO: 30.6 PG (ref 26–34)
MCHC RBC AUTO-ENTMCNC: 32.7 G/DL (ref 31–36)
MCV RBC AUTO: 93.4 FL (ref 80–100)
PDW BLD-RTO: 14.5 % (ref 12.4–15.4)
PLATELET # BLD: 165 K/UL (ref 135–450)
PMV BLD AUTO: 10 FL (ref 5–10.5)
POTASSIUM REFLEX MAGNESIUM: 4.2 MMOL/L (ref 3.5–5.1)
RBC # BLD: 4.61 M/UL (ref 4–5.2)
SODIUM BLD-SCNC: 140 MMOL/L (ref 136–145)
TRIGL SERPL-MCNC: 85 MG/DL (ref 0–150)
VLDLC SERPL CALC-MCNC: 17 MG/DL
WBC # BLD: 8.5 K/UL (ref 4–11)

## 2019-03-19 PROCEDURE — 80048 BASIC METABOLIC PNL TOTAL CA: CPT

## 2019-03-19 PROCEDURE — 6360000002 HC RX W HCPCS: Performed by: PHYSICIAN ASSISTANT

## 2019-03-19 PROCEDURE — 99232 SBSQ HOSP IP/OBS MODERATE 35: CPT | Performed by: PSYCHIATRY & NEUROLOGY

## 2019-03-19 PROCEDURE — 2580000003 HC RX 258: Performed by: PHYSICIAN ASSISTANT

## 2019-03-19 PROCEDURE — 6370000000 HC RX 637 (ALT 250 FOR IP): Performed by: PHYSICIAN ASSISTANT

## 2019-03-19 PROCEDURE — 94640 AIRWAY INHALATION TREATMENT: CPT

## 2019-03-19 PROCEDURE — 80061 LIPID PANEL: CPT

## 2019-03-19 PROCEDURE — 6370000000 HC RX 637 (ALT 250 FOR IP): Performed by: PSYCHIATRY & NEUROLOGY

## 2019-03-19 PROCEDURE — 36415 COLL VENOUS BLD VENIPUNCTURE: CPT

## 2019-03-19 PROCEDURE — 85027 COMPLETE CBC AUTOMATED: CPT

## 2019-03-19 RX ORDER — CLOPIDOGREL BISULFATE 75 MG/1
75 TABLET ORAL DAILY
Status: DISCONTINUED | OUTPATIENT
Start: 2019-03-19 | End: 2019-03-19 | Stop reason: HOSPADM

## 2019-03-19 RX ORDER — ASPIRIN 81 MG/1
81 TABLET, CHEWABLE ORAL DAILY
Status: DISCONTINUED | OUTPATIENT
Start: 2019-03-19 | End: 2019-03-19 | Stop reason: HOSPADM

## 2019-03-19 RX ORDER — ACETAMINOPHEN 80 MG
TABLET,CHEWABLE ORAL
Status: DISCONTINUED
Start: 2019-03-19 | End: 2019-03-19 | Stop reason: HOSPADM

## 2019-03-19 RX ADMIN — CLOPIDOGREL 75 MG: 75 TABLET, FILM COATED ORAL at 10:32

## 2019-03-19 RX ADMIN — HYDROCODONE BITARTRATE AND ACETAMINOPHEN 1 TABLET: 10; 325 TABLET ORAL at 07:24

## 2019-03-19 RX ADMIN — Medication 1 CAPSULE: at 07:55

## 2019-03-19 RX ADMIN — PREGABALIN 150 MG: 75 CAPSULE ORAL at 07:55

## 2019-03-19 RX ADMIN — Medication 10 ML: at 07:57

## 2019-03-19 RX ADMIN — TIOTROPIUM BROMIDE INHALATION SPRAY 2 PUFF: 3.12 SPRAY, METERED RESPIRATORY (INHALATION) at 09:08

## 2019-03-19 RX ADMIN — FLUOXETINE 60 MG: 20 CAPSULE ORAL at 07:56

## 2019-03-19 RX ADMIN — ENOXAPARIN SODIUM 40 MG: 40 INJECTION SUBCUTANEOUS at 07:56

## 2019-03-19 RX ADMIN — Medication 2 PUFF: at 09:07

## 2019-03-19 RX ADMIN — LEVOFLOXACIN 750 MG: 500 TABLET, FILM COATED ORAL at 07:56

## 2019-03-19 RX ADMIN — ROSUVASTATIN CALCIUM 20 MG: 20 TABLET, FILM COATED ORAL at 07:55

## 2019-03-19 RX ADMIN — LEVOTHYROXINE SODIUM 50 MCG: 25 TABLET ORAL at 07:05

## 2019-03-19 RX ADMIN — ASPIRIN 81 MG 81 MG: 81 TABLET ORAL at 10:32

## 2019-03-19 ASSESSMENT — PAIN SCALES - GENERAL
PAINLEVEL_OUTOF10: 10
PAINLEVEL_OUTOF10: 8
PAINLEVEL_OUTOF10: 5

## 2019-03-19 ASSESSMENT — PAIN DESCRIPTION - ORIENTATION: ORIENTATION: RIGHT;LEFT

## 2019-03-19 ASSESSMENT — PAIN DESCRIPTION - LOCATION: LOCATION: LEG

## 2019-03-19 NOTE — PROGRESS NOTES
AM assessment complete. Jorge heard throughout. Respirations unlabored. Moderate strength in arms. Legs weak. Patient states she still feels weak when she walks. 8/10 generalized pain. AM medications given as ordered. Patient encouraged to use call light with any needs. Patient states understanding, call light in reach, bed alarm on.

## 2019-03-19 NOTE — PROGRESS NOTES
Lakisha Moss  Neurology Follow-up  [facility]    Date of Service: 3/19/2019    Subjective:   CC: Follow up today regarding: Left sided paresthesia and cervical stenosis. Events noted. Chart and lab reviewed. The patient had a repeat MRI of the C-spine which showed the same chronic multilevel DJD and spinal stenosis with myomalacia. No new changes or epidural collection. No new symptoms today. Numbness has resolved. No weakness or chest pain, dysphagia, dysarthria, severe neck or back pain or bladder or bowel issues. Other review of system was unremarkable. ROS : A 10-12 system review obtained and updated today and is unremarkable except as mentioned  in my interval history.        Past Medical History:   Diagnosis Date    Aortic aneurysm ([facility]     monitored by Dr. Grady Currie disease [facility])     Cancer of vulva ([facility] 2008    microscopic invasive squamous carcinoma vulva    Cataract     COPD (chronic obstructive pulmonary disease) ([facility]     Emphysema (subcutaneous) (surgical) resulting from a procedure     History of radiation therapy      Completed external beam radiation therapy 04/23/14 total 5000 cGy to the right breast.     Hypertension     Lung bullae ([facility]     monitored by Dr. Joaquim Castellanos vascular dis      Current Facility-Administered Medications   Medication Dose Route Frequency Provider Last Rate Last Dose    pill splitter             clopidogrel (PLAVIX) tablet 75 mg  75 mg Oral Daily Kimmy Coon MD   75 mg at 03/19/19 1032    aspirin chewable tablet 81 mg  81 mg Oral Daily Kimmy Coon MD   81 mg at 03/19/19 1032    mometasone-formoterol (DULERA) 200-5 MCG/ACT inhaler 2 puff  2 puff Inhalation BID Alonzo Porter PA-C   2 puff at 03/19/19 0907    albuterol (PROVENTIL) nebulizer solution 2.5 mg  2.5 mg Nebulization Q6H PRN Alonzo Porter PA-C        exemestane (AROMASIN) tablet 25 mg-Patient Supply  25 mg Oral Daily Zakia Gleason, PA-C        FLUoxetine (PROZAC) capsule 60 mg  60 mg Oral Daily Zakia Gleason, PA-C   60 mg at 03/19/19 0756    HYDROcodone-acetaminophen (NORCO)  MG per tablet 1 tablet  1 tablet Oral Q6H PRN Zakia Gleason, PA-C   1 tablet at 03/19/19 0724    levothyroxine (SYNTHROID) tablet 50 mcg  50 mcg Oral Daily Zakia Gleason, PA-C   50 mcg at 03/19/19 6583    pregabalin (LYRICA) capsule 150 mg  150 mg Oral BID Zakia Gleason, PA-C   150 mg at 03/19/19 0755    rosuvastatin (CRESTOR) tablet 20 mg  20 mg Oral Daily Zakia Gleason, PA-C   20 mg at 03/19/19 0755    sodium chloride flush 0.9 % injection 10 mL  10 mL Intravenous 2 times per day Zakia Gleason, PA-C   10 mL at 03/19/19 0757    sodium chloride flush 0.9 % injection 10 mL  10 mL Intravenous PRN Zakia Gleason, PA-C        magnesium hydroxide (MILK OF MAGNESIA) 400 MG/5ML suspension 30 mL  30 mL Oral Daily PRN Zakia Gleason, PA-C        ondansetron [facility]) injection 4 mg  4 mg Intravenous Q6H PRN Zakia Gleason, PA-C        enoxaparin (LOVENOX) injection 40 mg  40 mg Subcutaneous Daily Zakia Gleason, PA-C   40 mg at 03/19/19 0756    acetaminophen (TYLENOL) tablet 650 mg  650 mg Oral Q4H PRN Zakia Gleason, PA-C        labetalol (NORMODYNE;TRANDATE) injection 10 mg  10 mg Intravenous Q10 Min PRN Zakia Gleason, PA-C        levofloxacin (LEVAQUIN) tablet 750 mg  750 mg Oral Daily Zakia Gleason, PA-C   750 mg at 03/19/19 0756    tiotropium (SPIRIVA RESPIMAT) 2.5 MCG/ACT inhaler 2 puff  2 puff Inhalation Daily Speedy Pollock MD   2 puff at 03/19/19 0908    melatonin tablet 3 mg  3 mg Oral Nightly PRN Zakia Gleason, PA-C        lactobacillus (CULTURELLE) capsule 1 capsule  1 capsule Oral BID WC Zakia Gleason PA-C   1 capsule at 03/19/19 1893     Allergies   Allergen Reactions    Codeine Hives    Penicillins Hives     family history includes Breast Cancer in her maternal aunt; Cancer in her mother; Heart Disease in her brother; Stroke in her sister. reports that she has been smoking cigarettes. She has a 114.00 pack-year smoking history. She has never used smokeless tobacco. She reports that she has current or past drug history. She reports that she does not drink alcohol. Objective:  Exam:   Constitutional:   Vitals:    03/19/19 0145 03/19/19 0500 03/19/19 0748 03/19/19 0907   BP: 132/65 138/72 137/87    Pulse: 66 67 64    Resp: 20 20 16    Temp: 97.4 °F (36.3 °C) 98.1 °F (36.7 °C) 98.6 °F (37 °C)    TempSrc: Temporal Temporal Temporal    SpO2:   93% 91%   Weight:  160 lb (72.6 kg)     Height:         General appearance:  Normal development and appear in no acute distress. Eye: No icterus. Neck: supple  Cardiovascular:  No lower leg edema with good pulsation. Mental Status:   Oriented to person, place, problem, and time. Memory: Aware of recent and remote event. Good immediate recall. Intact remote memory  Normal attention span and concentration. Language: intact naming, repeating and fluency   Good fund of Knowledge. Cranial Nerves:   II: Visual fields: Full. Pupils: equal, round, reactive to light  III,IV,VI: Extra Ocular Movements are intact. No nystagmus  V: Facial sensation is intact  VII: Facial strength and movements: intact and symmetric  IX: Palate elevation is symmetric  XI: Shoulder shrug is intact  XII: Tongue movements are normal  Musculoskeletal: 5/5 in all 4 extremities. Tone: Normal tone. Reflexes: 2 in arms and 3 in legs. Planters: flexor bilaterally. Coordination: no pronator drift, no dysmetria with FNF. Normal REM. Sensation: normal to all modalities in both arms and legs.   Gait/Posture: steady gait        Data:  LABS:   Lab Results   Component Value Date     03/19/2019    K 4.2 03/19/2019     03/19/2019    CO2 26 03/19/2019    BUN 13 03/19/2019    CREATININE 1.0 03/19/2019    GFRAA >60 03/19/2019    GFRAA >60 06/30/2012    LABGLOM 54 03/19/2019    GLUCOSE 107 03/19/2019    PHOS 3.3 11/03/2018    MG 1.80 11/03/2018    CALCIUM 8.2 03/19/2019     Lab Results   Component Value Date    WBC 8.5 03/19/2019    RBC 4.61 03/19/2019    RBC 4.43 01/27/2017    HGB 14.1 03/19/2019    HCT 43.0 03/19/2019    MCV 93.4 03/19/2019    RDW 14.5 03/19/2019     03/19/2019     Lab Results   Component Value Date    INR 0.97 10/26/2018    PROTIME 11.1 10/26/2018     Neuroimaging and/or  labs reviewed by me and discussed results with the patient and/or family. Impression:  New left sided paresthesia likely secondary to her history of cervical spinal stenosis and cervical myelopathy. Recent MRI of the C-spine showed no evidence of epidural hematoma or spinal cord injury. Chronic cervical spinal stenosis and myelopathy  Hypertension  Hyperlipidemia  Depression  Chronic pain  COPD  Smoking           Recommendation  Restart aspirin and Plavix  Statin  Smoking cessation  Continue SSRI  Blood pressure monitor at home  Stroke prevention was discussed with the patient  Discussed different options regarding her cervical stenosis. Follow[facility] with [facility]  Can be discharged from neurology if medically stable  No further recommendation           Blane Jaramillo MD   [phone]      This dictation was generated by voice recognition computer software. Although all attempts are made to edit the dictation for accuracy, there may be errors in the transcription that are not intended.

## 2019-03-19 NOTE — PROGRESS NOTES
Patient discharged to home with daughter. Discharge instructions reviewed. Patient verbalized understanding. IV and tele removed. Patient sent home with all belongings.

## 2019-03-19 NOTE — PROGRESS NOTES
Assessment completed, see MAR. Pt remains free from falls. Safety precautions in place. Bed in lowest position, bed wheels locked, side rails up 2x call light within reach, bed alarm on, yellow cloth in place, fall risk wrist band on. Will continue to monitor.

## 2019-03-19 NOTE — DISCHARGE SUMMARY
Hospital Medicine Discharge Summary    Patient ID: Jeffery Zarate      Patient's PCP: Terra Moy MD    Admit Date: 3/17/2019     Discharge Date:   3/19/19     Admitting Physician: Donald Manzanares MD     Discharge Physician: Steffen Robison MD     Discharge Diagnoses: Active Hospital Problems    Diagnosis Date Noted    Generalized weakness [R53.1] 03/18/2019    Numbness and tingling [R20.0, R20.2]     Cervical disc disease with myelopathy [M50.00]     Dyslipidemia [E78.5]     Smoking [F17.200] 09/25/2015    HTN (hypertension), benign [I10]        The patient was seen and examined on day of discharge and this discharge summary is in conjunction with any daily progress note from day of discharge. History Of Present Illness:    Jeffery Zarate is a 69 y.o. female with a PMH of COPD, breast cancer, hypothryoidism, HLD, HTN who presented to ED with complaint of generalized weakness and unsteady gait. She reports these symptoms started after a cervical injection on Tuesday. She reports symptoms have progressively worsened and that today she is unable to walk due to weakness. She denies fever, chills, dizziness, changes in vision, chest pain, shortness of breath, cough, edema, abdominal pain, N/V/D/C, pain with urination, urinary retention, urinary or fecal incontinence. Patient is a smoker.      She reports injection was for neck pain and was the 2nd one she received. She states after first injection, she had numbness and tingling to bilateral arms and legs but no weakness. An MRI was completed before patient received the 2nd injection.      She also reports she was started on levaquin on Monday for lung infection and has 3 days remaining. CXR impression from 3/11/19 reviewed which shows no significant acute abnormality.      Workup initiated in ED.  Patient initially failed to mention that she was having difficulty walking so clinicians were working under the impression that her only issue was numbness/tingling in her arms/legs. After she mentioned the issues with ambulation due to weakness and unsteady gait x 5 days, they recommended patient be admitted for further evaluation. CT head with nonspecific findings and acute process cannot be ruled out. Patient did leave the ER briefly to go out to smoke but came back in. CXR negative. No leukocytosis, electrolyte abnormalities or anemia.           Hospital Course:     10yo female with recent CHENCHO in cervical spine times 2 for  Neck pain issues came in with generalized weakness. MRI brain rule out acute  stroke. cta neck and brain showed rt vertebral artery stenosis with reconstitution. Neurology consulted. left sided paresthesia felt to be likely secondary to her history of cervical spinal stenosis and cervical myelopathy. MRI of the C-spine showed no evidence of epidural hematoma or spinal cord injury. Advised neurosurgery follow upas out pt       Tobacco dependence :   Counseled on cessation  Nicotine patch       htn: controlled.         HLD:     statin     Hypothyroidism:  synthroid       Hx of breast cancer     COPD: stable. Chronic pain syndrome:  lyrica and percocet. Thyroid nodule and neck LN enlargement: needs follow up    Physical Exam Performed:     /87   Pulse 64   Temp 98.6 °F (37 °C) (Temporal)   Resp 16   Ht 5' 5\" (1.651 m)   Wt 160 lb (72.6 kg)   LMP 03/11/1991 (Approximate) Comment: menarche 7years old  SpO2 91%   BMI 26.63 kg/m²       General appearance:  No apparent distress, appears stated age and cooperative. HEENT:  Normal cephalic, atraumatic without obvious deformity. Pupils equal, round, and reactive to light. Extra ocular muscles intact. Conjunctivae/corneas clear. Neck: Supple, with full range of motion. No jugular venous distention. Trachea midline. Respiratory:  Normal respiratory effort. Clear to auscultation, bilaterally without Rales/Wheezes/Rhonchi.   Cardiovascular:  Regular rate and rhythm with normal S1/S2 without murmurs, rubs or gallops. Abdomen: Soft, non-tender, non-distended with normal bowel sounds. Musculoskeletal:  No clubbing, cyanosis or edema bilaterally. Full range of motion without deformity. Skin: Skin color, texture, turgor normal.  No rashes or lesions. Neurologic:  Neurovascularly intact without any focal sensory/motor deficits. Cranial nerves: II-XII intact, grossly non-focal.  Psychiatric:  Alert and oriented, thought content appropriate, normal insight  Capillary Refill: Brisk,< 3 seconds   Peripheral Pulses: +2 palpable, equal bilaterally       Labs: For convenience and continuity at follow-up the following most recent labs are provided:      CBC:    Lab Results   Component Value Date    WBC 8.5 03/19/2019    HGB 14.1 03/19/2019    HCT 43.0 03/19/2019     03/19/2019       Renal:    Lab Results   Component Value Date     03/19/2019    K 4.2 03/19/2019     03/19/2019    CO2 26 03/19/2019    BUN 13 03/19/2019    CREATININE 1.0 03/19/2019    CALCIUM 8.2 03/19/2019    PHOS 3.3 11/03/2018         Significant Diagnostic Studies    Radiology:   CTA NECK W CONTRAST   Preliminary Result   There is lack of opacification involving majority of the cervical segment of   the left vertebral artery, with reconstitution at the V3 segment. This may   represent age indeterminate atherosclerotic occlusion or dissection. Severe stenosis at the origin of the right vertebral artery arising from the   right subclavian artery. Otherwise, no high-grade stenosis or focal occlusion involving the cervical   vasculature, or intracranial vasculature. No evidence of aneurysm. Abnormally enlarged left supraclavicular lymph nodes are nonspecific by CT   imaging alone. The left supraclavicular lymph node measures up to 22 x 14   mm. Clinical correlation and or short interval follow-up is recommended.       Descending thoracic aortic aneurysm measuring 3.8 cm is partially intracranial abnormality. 2. Evidence of sequela from lacunar stroke left basal ganglia and thalamus,   very likely remote. 3. White matter hypoattenuation described is typical of microvascular   ischemic disease or as sequela of dysmyelinating/demyelinating processes. 4.  Vascular calcifications are noted reflecting calcific atherosclerosis. 5. Possible acute right maxillary sinusitis. Nonspecific partial   opacification right maxillary sinus.                 Consults:     IP CONSULT TO HOSPITALIST  IP CONSULT TO NEUROLOGY    Disposition:  Home      Condition at Discharge: Stable    Discharge Instructions/Follow-up:   F/u with PCP in 1 week- needs f/u with CT neck in 1 year for thyroid nodule and left supraclavicular LN  Advised to have an appointment with neurosurgery ASAP    Code Status:  Full Code     Activity: activity as tolerated    Diet: cardiac diet      Discharge Medications:     Current Discharge Medication List           Details   levofloxacin (LEVAQUIN) 750 MG tablet Take 750 mg by mouth daily      INCRUSE ELLIPTA 62.5 MCG/INH AEPB INHALE 1 PUFF BY MOUTH DAILY AS DIRECTED  Qty: 1 each, Refills: 6      albuterol (PROVENTIL) (2.5 MG/3ML) 0.083% nebulizer solution Take 3 mLs by nebulization every 6 hours as needed for Wheezing Dx: COPD   ICD-10: J44.9  Qty: 120 each, Refills: 3      levalbuterol (XOPENEX) 0.63 MG/3ML nebulization Take 3 mLs by nebulization every 4 hours as needed for Wheezing Dx: COPD   ICD-10: J44.9  Qty: 360 mL, Refills: 5      ibuprofen (ADVIL;MOTRIN) 400 MG tablet Take 1 tablet by mouth every 6 hours as needed for Pain (incisional)  Qty: 90 tablet, Refills: 3      ADVAIR -21 MCG/ACT inhaler INHALE 2 PUFFS BY MOUTH TWICE DAILY  Qty: 1 Inhaler, Refills: 0      amLODIPine (NORVASC) 5 MG tablet Take 5 mg by mouth daily      exemestane (AROMASIN) 25 MG chemo tablet TAKE 1 TABLET BY MOUTH ONCE A DAY FOR BREAST CANCER  Qty: 90 tablet, Refills: 0      clopidogrel (PLAVIX) 75 MG

## 2019-03-20 NOTE — PROGRESS NOTES
Physical Therapy  Physical Therapy Discharge Summary    Name: Brigid Rowland  : 1945    The pt was evaluated by PT on 3/18/19 and seen for 0 treatment sessions prior to DC to home on 3/19/19 per MD order. The pt's acute therapy goals were:  Short term goals  Time Frame for Short term goals: to be met by discharge  Short term goal 1: independent with bed mobility   Short term goal 2: supervision with transfers  Short term goal 3: ambulate 100 ft with LRAD and supervision        Patient met 0 goals during stay. Number of Refusals:0  Number of Holds: 0  During this hospitalization, the patient was educated on:  Patient Education: PT POC and role, dc recommendations, postural cues     DC pt from PT caseload at this time.   Thank you, Mat Rosales, PT, DPT [id number]

## 2019-04-04 ENCOUNTER — OFFICE VISIT (OUTPATIENT)
Dept: PULMONOLOGY | Age: 74
End: 2019-04-04
Payer: MEDICARE

## 2019-04-04 VITALS
HEIGHT: 66 IN | SYSTOLIC BLOOD PRESSURE: 110 MMHG | OXYGEN SATURATION: 97 % | WEIGHT: 160 LBS | BODY MASS INDEX: 25.71 KG/M2 | HEART RATE: 68 BPM | DIASTOLIC BLOOD PRESSURE: 62 MMHG

## 2019-04-04 DIAGNOSIS — J20.9 ACUTE BRONCHITIS, UNSPECIFIED ORGANISM: Primary | ICD-10-CM

## 2019-04-04 DIAGNOSIS — J44.1 COPD EXACERBATION (HCC): ICD-10-CM

## 2019-04-04 PROCEDURE — G8399 PT W/DXA RESULTS DOCUMENT: HCPCS | Performed by: INTERNAL MEDICINE

## 2019-04-04 PROCEDURE — 1111F DSCHRG MED/CURRENT MED MERGE: CPT | Performed by: INTERNAL MEDICINE

## 2019-04-04 PROCEDURE — 4004F PT TOBACCO SCREEN RCVD TLK: CPT | Performed by: INTERNAL MEDICINE

## 2019-04-04 PROCEDURE — 4040F PNEUMOC VAC/ADMIN/RCVD: CPT | Performed by: INTERNAL MEDICINE

## 2019-04-04 PROCEDURE — 1090F PRES/ABSN URINE INCON ASSESS: CPT | Performed by: INTERNAL MEDICINE

## 2019-04-04 PROCEDURE — G8926 SPIRO NO PERF OR DOC: HCPCS | Performed by: INTERNAL MEDICINE

## 2019-04-04 PROCEDURE — 99214 OFFICE O/P EST MOD 30 MIN: CPT | Performed by: INTERNAL MEDICINE

## 2019-04-04 PROCEDURE — 1123F ACP DISCUSS/DSCN MKR DOCD: CPT | Performed by: INTERNAL MEDICINE

## 2019-04-04 PROCEDURE — G8417 CALC BMI ABV UP PARAM F/U: HCPCS | Performed by: INTERNAL MEDICINE

## 2019-04-04 PROCEDURE — G8427 DOCREV CUR MEDS BY ELIG CLIN: HCPCS | Performed by: INTERNAL MEDICINE

## 2019-04-04 PROCEDURE — 3017F COLORECTAL CA SCREEN DOC REV: CPT | Performed by: INTERNAL MEDICINE

## 2019-04-04 PROCEDURE — G8598 ASA/ANTIPLAT THER USED: HCPCS | Performed by: INTERNAL MEDICINE

## 2019-04-04 PROCEDURE — 3023F SPIROM DOC REV: CPT | Performed by: INTERNAL MEDICINE

## 2019-04-04 NOTE — PROGRESS NOTES
Chief Complaint     Chief Complaint   Patient presents with    Chest Congestion     x 1 wk    Shortness of Breath     She has been complaining of chest congestion and increased cough and shortness of breath for the last week  She was admitted to the hospital last month with lower extremity weakness and she was evaluated by neurology  Patient is S/P VATs thoracoscopy with resection of left upper lobe cancer unfortunately patient was unable to tolerate one lung ventilation long enough to have sampling of her mediastinal lymph nodes  Unfortunately she continues to smoke  She has been using her inhalers on regular bases  She does have oxygen at home and use that as needed   She also uses nebulizer treatment 3-4 times per day  He has hiatal hernia and GERD symptoms  She has been a smoker for 54 years   PET CT was read as:  EXAMINATION:   Skullbase to midthigh PET/CT       12/12/2018       TECHNIQUE:   Following IV injection of 13.74 mCi of F 18 -FDG, PET  tumor imaging was   acquired from the base of the skull to the mid thighs.  Computed tomography   was used for purposes of attenuation correction and anatomic localization. Fusion imaging was utilized for interpretation.       Uptake time 60 mins.  Glucose level 92 mg/dl.       COMPARISON:   Chest CT dated 11/23/2018, 09/26/2018       HISTORY:   ORDERING SYSTEM PROVIDED HISTORY: Lung nodule       Initial evaluation       FINDINGS:   HEAD/NECK: Mucosal thickening of the right maxillary sinus with mild   associated activity, in keeping with sinusitis.  Physiologic activity is   favored at the vocal cords.  Calcification of the carotid arteries. Physiologic activity is seen within cervical musculature.       CHEST: Calcification of the thoracic aorta.  Coronary artery calcification.    No marked mediastinal daniel activity is demonstrated.  Mild activity seen   associated with hilar lymph nodes bilaterally and an anterior mediastinal   lymph node.  No evidence of year    Past Medical History:   Diagnosis Date    Aortic aneurysm ([facility]     monitored by Dr. Linares Liming disease ([place] [id number]     Cancer of vulva ([facility] 2008    microscopic invasive squamous carcinoma vulva    Cataract     COPD (chronic obstructive pulmonary disease) ([facility]     Emphysema (subcutaneous) (surgical) resulting from a procedure     History of radiation therapy      Completed external beam radiation therapy 04/23/14 total 5000 cGy to the right breast.     Hypertension     Lung bullae ([place] [address]     monitored by Dr. Wilfredo Bassett vascular dis      Current Outpatient Prescriptions   Medication Sig Dispense Refill    exemestane (AROMASIN) 25 MG chemo tablet TAKE 1 TABLET BY MOUTH ONCE A DAY FOR BREAST CANCER 90 tablet 0    clopidogrel (PLAVIX) 75 MG tablet Take 75 mg by mouth daily      OXYGEN Inhale into the lungs At night prn      FLUoxetine (PROZAC) 20 MG capsule Take 3 capsules by mouth daily 90 capsule 3    HYDROcodone-acetaminophen (NORCO)  MG per tablet Take 1 tablet by mouth every 6 hours as needed for Pain. 15 tablet 0    bisacodyl (DULCOLAX) 5 MG EC tablet Take 5 mg by mouth nightly as needed for Constipation.  levothyroxine (SYNTHROID) 50 MCG tablet Take 50 mcg by mouth Daily.  albuterol (PROVENTIL HFA;VENTOLIN HFA) 108 (90 BASE) MCG/ACT inhaler Inhale 2 puffs into the lungs every 6 hours as needed for Wheezing.  guaifenesin (MUCINEX) 600 MG SR tablet Take 800 mg by mouth 3 times daily.  fluticasone-salmeterol (ADVAIR HFA) 115-21 MCG/ACT inhaler Inhale 2 puffs into the lungs daily.  potassium chloride SA (K-DUR;KLOR-CON) 20 MEQ tablet Take 20 mEq by mouth 2 times daily.  cilostazol (PLETAL) 100 MG tablet Take 100 mg by mouth 2 times daily.  furosemide (LASIX) 40 MG tablet Take 40 mg by mouth daily.  pregabalin (LYRICA) 150 MG capsule Take 150 mg by mouth 2 times daily.       rosuvastatin (CRESTOR) 10 MG tablet Take 20 mg

## 2019-06-12 ENCOUNTER — TELEPHONE (OUTPATIENT)
Dept: PULMONOLOGY | Age: 74
End: 2019-06-12

## 2019-06-12 NOTE — TELEPHONE ENCOUNTER
I spoke with the pt and she can not get out of her house at this time due to her not being able to walk after having a pain shot in her neck - pt was given the ph# to scheduling dept and she will make a follow up appt after she has the CT Chest

## 2019-08-30 ENCOUNTER — HOSPITAL ENCOUNTER (OUTPATIENT)
Dept: ULTRASOUND IMAGING | Age: 74
Discharge: HOME | End: 2019-08-30
Payer: MEDICARE

## 2019-08-30 ENCOUNTER — HOSPITAL ENCOUNTER (OUTPATIENT)
Dept: WOMENS IMAGING | Age: 74
Discharge: HOME | End: 2019-08-30
Payer: MEDICARE

## 2019-08-30 ENCOUNTER — HOSPITAL ENCOUNTER (OUTPATIENT)
Dept: CT IMAGING | Age: 74
Discharge: HOME | End: 2019-08-30
Payer: MEDICARE

## 2019-08-30 DIAGNOSIS — Z85.3 PERSONAL HISTORY OF BREAST CANCER: ICD-10-CM

## 2019-08-30 DIAGNOSIS — C34.12 SQUAMOUS CELL CARCINOMA OF BRONCHUS IN LEFT UPPER LOBE (HCC): ICD-10-CM

## 2019-08-30 DIAGNOSIS — R92.8 ABNORMAL FINDINGS ON DIAGNOSTIC IMAGING OF BREAST: Primary | ICD-10-CM

## 2019-08-30 DIAGNOSIS — R92.8 ABNORMAL MAMMOGRAM: ICD-10-CM

## 2019-08-30 DIAGNOSIS — R92.8 ABNORMAL FINDINGS ON DIAGNOSTIC IMAGING OF BREAST: ICD-10-CM

## 2019-08-30 LAB
A/G RATIO: 1.2 (ref 1.1–2.2)
ALBUMIN SERPL-MCNC: 3.7 G/DL (ref 3.4–5)
ALP BLD-CCNC: 118 U/L (ref 40–129)
ALT SERPL-CCNC: 11 U/L (ref 10–40)
ANION GAP SERPL CALCULATED.3IONS-SCNC: 9 MMOL/L (ref 3–16)
AST SERPL-CCNC: 14 U/L (ref 15–37)
BILIRUB SERPL-MCNC: 0.3 MG/DL (ref 0–1)
BUN BLDV-MCNC: 7 MG/DL (ref 7–20)
CALCIUM SERPL-MCNC: 8.5 MG/DL (ref 8.3–10.6)
CHLORIDE BLD-SCNC: 105 MMOL/L (ref 99–110)
CO2: 26 MMOL/L (ref 21–32)
CREAT SERPL-MCNC: 0.8 MG/DL (ref 0.6–1.2)
GFR AFRICAN AMERICAN: >60
GFR NON-AFRICAN AMERICAN: >60
GLOBULIN: 3 G/DL
GLUCOSE BLD-MCNC: 104 MG/DL (ref 70–99)
POTASSIUM SERPL-SCNC: 4.2 MMOL/L (ref 3.5–5.1)
SODIUM BLD-SCNC: 140 MMOL/L (ref 136–145)
TOTAL PROTEIN: 6.7 G/DL (ref 6.4–8.2)

## 2019-08-30 PROCEDURE — G0279 TOMOSYNTHESIS, MAMMO: HCPCS

## 2019-08-30 PROCEDURE — 76642 ULTRASOUND BREAST LIMITED: CPT

## 2019-08-30 PROCEDURE — 80053 COMPREHEN METABOLIC PANEL: CPT

## 2019-08-30 PROCEDURE — 71260 CT THORAX DX C+: CPT

## 2019-08-30 PROCEDURE — 36415 COLL VENOUS BLD VENIPUNCTURE: CPT

## 2019-08-30 PROCEDURE — 6360000004 HC RX CONTRAST MEDICATION: Performed by: INTERNAL MEDICINE

## 2019-08-30 RX ADMIN — IOPAMIDOL 75 ML: 755 INJECTION, SOLUTION INTRAVENOUS at 12:56

## 2019-09-05 ENCOUNTER — HOSPITAL ENCOUNTER (OUTPATIENT)
Dept: ULTRASOUND IMAGING | Age: 74
Discharge: HOME | End: 2019-09-05
Payer: MEDICARE

## 2019-09-05 ENCOUNTER — HOSPITAL ENCOUNTER (OUTPATIENT)
Dept: WOMENS IMAGING | Age: 74
Discharge: HOME | End: 2019-09-05
Payer: MEDICARE

## 2019-09-05 DIAGNOSIS — R92.8 ABNORMAL MAMMOGRAM: ICD-10-CM

## 2019-09-05 DIAGNOSIS — R92.8 ABNORMAL FINDINGS ON DIAGNOSTIC IMAGING OF BREAST: ICD-10-CM

## 2019-09-05 DIAGNOSIS — Z98.890 STATUS POST BIOPSY: ICD-10-CM

## 2019-09-05 PROCEDURE — C1894 INTRO/SHEATH, NON-LASER: HCPCS

## 2019-09-05 PROCEDURE — 88305 TISSUE EXAM BY PATHOLOGIST: CPT

## 2019-09-05 PROCEDURE — 77065 DX MAMMO INCL CAD UNI: CPT

## 2019-09-05 PROCEDURE — 2500000003 HC RX 250 WO HCPCS: Performed by: SURGERY

## 2019-09-05 RX ORDER — LIDOCAINE HYDROCHLORIDE 10 MG/ML
5 INJECTION, SOLUTION EPIDURAL; INFILTRATION; INTRACAUDAL; PERINEURAL ONCE
Status: COMPLETED | OUTPATIENT
Start: 2019-09-05 | End: 2019-09-05

## 2019-09-05 RX ADMIN — LIDOCAINE HYDROCHLORIDE 10 ML: 10; .005 INJECTION, SOLUTION EPIDURAL; INFILTRATION; INTRACAUDAL; PERINEURAL at 13:20

## 2019-09-05 RX ADMIN — LIDOCAINE HYDROCHLORIDE 5 ML: 10 INJECTION, SOLUTION EPIDURAL; INFILTRATION; INTRACAUDAL; PERINEURAL at 13:15

## 2019-09-05 ASSESSMENT — PAIN SCALES - GENERAL: PAINLEVEL_OUTOF10: 3

## 2019-10-15 ENCOUNTER — OFFICE VISIT (OUTPATIENT)
Dept: PULMONOLOGY | Age: 74
End: 2019-10-15
Payer: MEDICARE

## 2019-10-15 VITALS
BODY MASS INDEX: 23.89 KG/M2 | HEART RATE: 64 BPM | DIASTOLIC BLOOD PRESSURE: 70 MMHG | RESPIRATION RATE: 14 BRPM | WEIGHT: 148 LBS | OXYGEN SATURATION: 93 % | SYSTOLIC BLOOD PRESSURE: 108 MMHG

## 2019-10-15 DIAGNOSIS — J44.9 COPD, MILD (HCC): Primary | ICD-10-CM

## 2019-10-15 DIAGNOSIS — C34.92 NON-SMALL CELL CANCER OF LEFT LUNG (HCC): ICD-10-CM

## 2019-10-15 DIAGNOSIS — J01.90 ACUTE NON-RECURRENT SINUSITIS, UNSPECIFIED LOCATION: ICD-10-CM

## 2019-10-15 DIAGNOSIS — Z23 NEED FOR IMMUNIZATION AGAINST INFLUENZA: ICD-10-CM

## 2019-10-15 PROCEDURE — G8484 FLU IMMUNIZE NO ADMIN: HCPCS | Performed by: INTERNAL MEDICINE

## 2019-10-15 PROCEDURE — G8598 ASA/ANTIPLAT THER USED: HCPCS | Performed by: INTERNAL MEDICINE

## 2019-10-15 PROCEDURE — 3017F COLORECTAL CA SCREEN DOC REV: CPT | Performed by: INTERNAL MEDICINE

## 2019-10-15 PROCEDURE — 1090F PRES/ABSN URINE INCON ASSESS: CPT | Performed by: INTERNAL MEDICINE

## 2019-10-15 PROCEDURE — 4040F PNEUMOC VAC/ADMIN/RCVD: CPT | Performed by: INTERNAL MEDICINE

## 2019-10-15 PROCEDURE — 3023F SPIROM DOC REV: CPT | Performed by: INTERNAL MEDICINE

## 2019-10-15 PROCEDURE — 4004F PT TOBACCO SCREEN RCVD TLK: CPT | Performed by: INTERNAL MEDICINE

## 2019-10-15 PROCEDURE — G8428 CUR MEDS NOT DOCUMENT: HCPCS | Performed by: INTERNAL MEDICINE

## 2019-10-15 PROCEDURE — 99213 OFFICE O/P EST LOW 20 MIN: CPT | Performed by: INTERNAL MEDICINE

## 2019-10-15 PROCEDURE — G8399 PT W/DXA RESULTS DOCUMENT: HCPCS | Performed by: INTERNAL MEDICINE

## 2019-10-15 PROCEDURE — G8420 CALC BMI NORM PARAMETERS: HCPCS | Performed by: INTERNAL MEDICINE

## 2019-10-15 PROCEDURE — 1123F ACP DISCUSS/DSCN MKR DOCD: CPT | Performed by: INTERNAL MEDICINE

## 2019-10-15 PROCEDURE — 90653 IIV ADJUVANT VACCINE IM: CPT | Performed by: INTERNAL MEDICINE

## 2019-10-15 PROCEDURE — G0008 ADMIN INFLUENZA VIRUS VAC: HCPCS | Performed by: INTERNAL MEDICINE

## 2019-10-15 PROCEDURE — G8926 SPIRO NO PERF OR DOC: HCPCS | Performed by: INTERNAL MEDICINE

## 2019-10-15 RX ORDER — RIVAROXABAN 20 MG/1
TABLET, FILM COATED ORAL
Refills: 2 | Status: ON HOLD | COMMUNITY
Start: 2019-09-15 | End: 2022-01-01 | Stop reason: HOSPADM

## 2019-10-15 RX ORDER — LEVALBUTEROL INHALATION SOLUTION 0.63 MG/3ML
0.63 SOLUTION RESPIRATORY (INHALATION) EVERY 4 HOURS PRN
Qty: 360 ML | Refills: 5 | Status: SHIPPED | OUTPATIENT
Start: 2019-10-15 | End: 2019-12-23 | Stop reason: SDUPTHER

## 2019-10-15 RX ORDER — DOXYCYCLINE HYCLATE 100 MG
100 TABLET ORAL 2 TIMES DAILY
Qty: 14 TABLET | Refills: 0 | Status: SHIPPED | OUTPATIENT
Start: 2019-10-15 | End: 2019-10-22

## 2019-10-15 RX ORDER — ALBUTEROL SULFATE 90 UG/1
2 AEROSOL, METERED RESPIRATORY (INHALATION) EVERY 6 HOURS PRN
Qty: 1 INHALER | Refills: 3 | Status: SHIPPED | OUTPATIENT
Start: 2019-10-15 | End: 2019-11-20 | Stop reason: SDUPTHER

## 2019-10-15 RX ORDER — DULOXETIN HYDROCHLORIDE 30 MG/1
30 CAPSULE, DELAYED RELEASE ORAL DAILY
COMMUNITY

## 2019-10-15 ASSESSMENT — ENCOUNTER SYMPTOMS
BLOOD IN STOOL: 0
DIARRHEA: 0
SORE THROAT: 0
VOICE CHANGE: 0
ANAL BLEEDING: 0
CHOKING: 0
RHINORRHEA: 0
SHORTNESS OF BREATH: 0
STRIDOR: 0
SINUS PRESSURE: 1
ABDOMINAL DISTENTION: 0
APNEA: 0
COUGH: 0
BACK PAIN: 1
CHEST TIGHTNESS: 0
CONSTIPATION: 0
WHEEZING: 0
ABDOMINAL PAIN: 0

## 2019-11-01 ENCOUNTER — HOSPITAL ENCOUNTER (OUTPATIENT)
Dept: PHYSICAL THERAPY | Age: 74
Setting detail: THERAPIES SERIES
Discharge: HOME | End: 2019-11-01
Payer: MEDICARE

## 2019-11-01 PROCEDURE — 97530 THERAPEUTIC ACTIVITIES: CPT

## 2019-11-01 PROCEDURE — 97162 PT EVAL MOD COMPLEX 30 MIN: CPT

## 2019-11-06 ENCOUNTER — HOSPITAL ENCOUNTER (OUTPATIENT)
Dept: PHYSICAL THERAPY | Age: 74
Setting detail: THERAPIES SERIES
Discharge: HOME | End: 2019-11-06
Payer: MEDICARE

## 2019-11-06 PROCEDURE — 97113 AQUATIC THERAPY/EXERCISES: CPT

## 2019-11-08 ENCOUNTER — HOSPITAL ENCOUNTER (OUTPATIENT)
Dept: PHYSICAL THERAPY | Age: 74
Setting detail: THERAPIES SERIES
Discharge: HOME | End: 2019-11-08
Payer: MEDICARE

## 2019-11-08 PROCEDURE — 97113 AQUATIC THERAPY/EXERCISES: CPT

## 2019-11-19 ENCOUNTER — HOSPITAL ENCOUNTER (OUTPATIENT)
Dept: PHYSICAL THERAPY | Age: 74
Setting detail: THERAPIES SERIES
Discharge: HOME | End: 2019-11-19
Payer: MEDICARE

## 2019-11-19 PROCEDURE — 97113 AQUATIC THERAPY/EXERCISES: CPT

## 2019-11-20 RX ORDER — ALBUTEROL SULFATE 90 UG/1
2 AEROSOL, METERED RESPIRATORY (INHALATION) EVERY 6 HOURS PRN
Qty: 3 INHALER | Refills: 0 | Status: SHIPPED | OUTPATIENT
Start: 2019-11-20 | End: 2021-01-01 | Stop reason: SDUPTHER

## 2019-11-21 ENCOUNTER — HOSPITAL ENCOUNTER (OUTPATIENT)
Dept: PHYSICAL THERAPY | Age: 74
Setting detail: THERAPIES SERIES
Discharge: HOME | End: 2019-11-21
Payer: MEDICARE

## 2019-11-21 PROCEDURE — 97113 AQUATIC THERAPY/EXERCISES: CPT

## 2019-11-27 ENCOUNTER — HOSPITAL ENCOUNTER (OUTPATIENT)
Dept: CT IMAGING | Age: 74
Discharge: HOME | End: 2019-11-27
Payer: MEDICARE

## 2019-11-27 ENCOUNTER — HOSPITAL ENCOUNTER (OUTPATIENT)
Dept: VASCULAR LAB | Age: 74
Discharge: HOME | End: 2019-11-27
Payer: MEDICARE

## 2019-11-27 DIAGNOSIS — I65.23 ATHEROSCLEROSIS OF BOTH CAROTID ARTERIES: ICD-10-CM

## 2019-11-27 DIAGNOSIS — I77.810 THORACIC AORTIC ECTASIA (HCC): ICD-10-CM

## 2019-11-27 PROCEDURE — 71250 CT THORAX DX C-: CPT

## 2019-11-27 PROCEDURE — 93880 EXTRACRANIAL BILAT STUDY: CPT

## 2019-12-03 ENCOUNTER — TELEPHONE (OUTPATIENT)
Dept: VASCULAR SURGERY | Age: 74
End: 2019-12-03

## 2019-12-03 ENCOUNTER — HOSPITAL ENCOUNTER (OUTPATIENT)
Dept: PHYSICAL THERAPY | Age: 74
Setting detail: THERAPIES SERIES
Discharge: HOME | End: 2019-12-03
Payer: MEDICARE

## 2019-12-03 DIAGNOSIS — I65.23 ATHEROSCLEROSIS OF BOTH CAROTID ARTERIES: Primary | ICD-10-CM

## 2019-12-03 DIAGNOSIS — I77.810 THORACIC AORTIC ECTASIA (HCC): ICD-10-CM

## 2019-12-03 PROCEDURE — 97113 AQUATIC THERAPY/EXERCISES: CPT

## 2019-12-05 ENCOUNTER — HOSPITAL ENCOUNTER (OUTPATIENT)
Dept: PHYSICAL THERAPY | Age: 74
Setting detail: THERAPIES SERIES
Discharge: HOME | End: 2019-12-05
Payer: MEDICARE

## 2019-12-10 ENCOUNTER — HOSPITAL ENCOUNTER (OUTPATIENT)
Dept: PHYSICAL THERAPY | Age: 74
Setting detail: THERAPIES SERIES
Discharge: HOME | End: 2019-12-10
Payer: MEDICARE

## 2019-12-10 PROCEDURE — 97113 AQUATIC THERAPY/EXERCISES: CPT

## 2019-12-12 ENCOUNTER — HOSPITAL ENCOUNTER (OUTPATIENT)
Dept: PHYSICAL THERAPY | Age: 74
Setting detail: THERAPIES SERIES
Discharge: HOME | End: 2019-12-12
Payer: MEDICARE

## 2019-12-12 PROCEDURE — 97113 AQUATIC THERAPY/EXERCISES: CPT

## 2019-12-17 ENCOUNTER — APPOINTMENT (OUTPATIENT)
Dept: PHYSICAL THERAPY | Age: 74
End: 2019-12-17
Payer: MEDICARE

## 2019-12-18 ENCOUNTER — HOSPITAL ENCOUNTER (OUTPATIENT)
Dept: PHYSICAL THERAPY | Age: 74
Setting detail: THERAPIES SERIES
Discharge: HOME | End: 2019-12-18
Payer: MEDICARE

## 2019-12-18 PROCEDURE — 97116 GAIT TRAINING THERAPY: CPT

## 2019-12-18 PROCEDURE — 97110 THERAPEUTIC EXERCISES: CPT

## 2019-12-18 PROCEDURE — 97530 THERAPEUTIC ACTIVITIES: CPT

## 2019-12-23 RX ORDER — LEVALBUTEROL INHALATION SOLUTION 0.63 MG/3ML
0.63 SOLUTION RESPIRATORY (INHALATION) EVERY 4 HOURS PRN
Qty: 360 ML | Refills: 1 | Status: SHIPPED | OUTPATIENT
Start: 2019-12-23 | End: 2019-12-23

## 2019-12-23 RX ORDER — LEVALBUTEROL INHALATION SOLUTION 0.63 MG/3ML
SOLUTION RESPIRATORY (INHALATION)
Qty: 1080 ML | Refills: 0 | Status: SHIPPED | OUTPATIENT
Start: 2019-12-23

## 2020-01-08 ENCOUNTER — APPOINTMENT (OUTPATIENT)
Dept: PHYSICAL THERAPY | Age: 75
End: 2020-01-08
Payer: MEDICARE

## 2020-01-10 ENCOUNTER — APPOINTMENT (OUTPATIENT)
Dept: PHYSICAL THERAPY | Age: 75
End: 2020-01-10
Payer: MEDICARE

## 2020-01-13 ENCOUNTER — HOSPITAL ENCOUNTER (OUTPATIENT)
Dept: PHYSICAL THERAPY | Age: 75
Setting detail: THERAPIES SERIES
Discharge: HOME | End: 2020-01-13
Payer: MEDICARE

## 2020-01-15 ENCOUNTER — APPOINTMENT (OUTPATIENT)
Dept: PHYSICAL THERAPY | Age: 75
End: 2020-01-15
Payer: MEDICARE

## 2020-01-20 ENCOUNTER — HOSPITAL ENCOUNTER (OUTPATIENT)
Dept: PHYSICAL THERAPY | Age: 75
Setting detail: THERAPIES SERIES
Discharge: HOME | End: 2020-01-20
Payer: MEDICARE

## 2020-01-20 NOTE — PROGRESS NOTES
Physical Therapy  Cancellation/No-show Note  Patient Name:  Rahul Acosta  :  1945   Date:  2020  Cancelled visits to date: 3  No-shows to date: 1    Patient status for today's appointment patient:  [x]  Cancelled , ,    []  Rescheduled appointment  []  No-show         Reason given by patient:  []  Patient ill - has the flu  []  Conflicting appointment  []  No transportation    []  Conflict with work  []  No reason given  [x]  Other:     Comments:  Was up all night and just went to bed at 9 a.m. Phone call information:   []  Phone call made today to patient at 12:40 pm at number provided:      []  Patient answered, conversation as follows:    []  Patient did not answer, message left as follows:  Left message notifying of her 2nd straight cancel or no show appt. Asked if pt was still sick, to call and let us know.   Reminded her of her next scheduled 1:1 pool appt on  at 11 am   [x]  Phone call not made today    Electronically signed by:  Beti Ang PT

## 2020-01-22 ENCOUNTER — APPOINTMENT (OUTPATIENT)
Dept: PHYSICAL THERAPY | Age: 75
End: 2020-01-22
Payer: MEDICARE

## 2020-01-24 ENCOUNTER — HOSPITAL ENCOUNTER (OUTPATIENT)
Dept: PHYSICAL THERAPY | Age: 75
Setting detail: THERAPIES SERIES
Discharge: HOME | End: 2020-01-24
Payer: MEDICARE

## 2020-01-24 PROCEDURE — 97113 AQUATIC THERAPY/EXERCISES: CPT

## 2020-01-24 NOTE — FLOWSHEET NOTE
EXERCISES; IF IN RED - SEATED EXERCISE ONLY; IN/OUT OF POOL VIA CHAIR LIFT; FATIGUES QUICKLY; VARIABLE LE MOVEMENT  AquaticTherapy Dates of Service: 11/6, 11/8, 11/19, 11/21, 12/3, 12/10, 1/24   Aquatic Visits Exercises/Activities:  43991 and / or 41429   Transfers:          % Immersion:            Ambulation:   UE Exercises:  1/24: done in seated    Forwards  x 2 lap w/ 1 N min A/mod A, x 1 lap end of session Shoulder Shrugs      Lateral   Shoulder Circles      Retro  Scapular Retraction   x 10    Marching  Push Downs       Cariocas   Punching     Jog    Rowing     Multifidi walkouts with paddle   Elbow Flex/Ext X 10       Shldr Flex/Ext X 10      Shldr aBd/aDd X 10   LE Exercises: 1 Shldr Horiz aBd/aDd X 10    HR/TR X 10 Shldr IR/ER    Marches X 10 Arm Circles X 10 cw/ccw   Squats X 10  PNF Diagonals    Hamstring Curls X 10  Wall Push Ups    Hip Flexion (SLR) X 10   Figure 8's    Hip aBduction (SLR) X 10 Bilateral Pull Downs    Hip Extension (SLR) X 10       Hip aDduction (SLR) X      Hip Circles X 10  Functional:    Hip IR/Er  Step up forward    TrA Set   Step up lateral     Pelvic Tilts  X 10 Step down     Fig 8's   Lunges Forward    LE PNF  Lunges Retro      Lunges Lateral     Balance:   Lower ab curl with noodle     SLS       Tandem Stance Semi-Tandem,  x 30 sec ea way with fingertips     NBOS eyes open 2 x 30 sec with fingertipsSeated:     NBOS eyes closed  Ankle pumps   Hand to Opposite Knee  Ankle Circles   Fwd Step ups to SLS  Ankle DF/PF   Lateral Step ups to SLS  Ankle Inv/Ev   Stop/Go Gait   Bicycle  2X 20 B   Normal HARIKA  Knee Flex/Ext 2X 20 B     Hip IR/ER   Stretching:   Hip aBd/aDd     Marching    Gastroc/Soleus  SLR   Hamstring   Noodle:     Knee Flex Stretch  Leg Press    Piriformis   Noodle Hang at [employer]    Hip Flexor  Noodle Hang Deep Water    SKTC  Noodle Bicycle     DKTC       ITB      Quad  Deep Water:    Mid Back   Jog    UT  Jumping Jacks    Post Shoulder  Heel to Toe    Ladder Pull  Hand to Opposite Knee    Pec Stretch  Rocking Horse      [employer]          Cervical:   Other:     AROM Flexion      AROM Extension      AROM Side Bending      AROM Rotation      Chin Tucks      Chin Nods        Aquatic Abbreviation Key  B= Belt DB= Dumbells T= Theratube   H= Hydrotone N= Noodles W= Weights   P= Paddles S= Speedo equipment K= Kickboard       Pt. Education:  12/18: Extensive discussion with pt today on goals of PT and limitations with pool interventions. Discussed that pool is a good starting point however to maximize functional strength and ambulation progressions, best environment is with land based PT and strongly encouraging pt to continue working on land based session. Pt becoming very frustrated and tearful expressing that in the pool she can work out and does not feels pain or soreness after and is afraid to fully transition out of the pool as therex out of the water tends to significantly increase her pain. After ambulation and pt was able to ambulate significant farther with increased endurance and control as well as improved with RW pt agreeable to continue with PT both pool as well as in clinic with expectation that as long as she is progressing in clinic with minimal symptoms with continue to gradually work to get out of the pool. 11/2/19 Pt was educated on PT POC, Diagnosis, Prognosis, pathomechanics as well as frequency and duration of scheduling future physical therapy appointments. Time was also taken on this day to answer all patient questions and participation in PT. 11/2/19 Pt was educated on aquatic therapy intervention and POC as well as taken on tour of pool area in which pt was shown family changing rooms, how to utilize lockers what to wear for sessions as well as emphasized treatments take place in group setting. Pt was also educated on process of checking in at  and reporting back to pool area taking care with transition to pool area due to wet floors.  Pt was also issued pool handout which outlines process, reiterates cancellation/no show policy as well as further instructions on accessing lockers. Pt was also informed that this PT does NOT do aquatic PT and will discuss case with PT in pool to work with. Discussed with pt need for daughter to help pt get back to locker room area as well as for changing and PT will focus on pool activity only    HEP instruction:      Therapeutic Exercise and NMR EXR  [] (23872) Provided verbal/tactile cueing for activities related to strengthening, flexibility, endurance, ROM for improvements in  [] LE / Lumbar: LE, proximal hip, and core control with self care, mobility, lifting, ambulation. [] UE / Cervical: cervical, postural, scapular, scapulothoracic and UE control with self care, reaching, carrying, lifting, house/yardwork, driving, computer work.  [] (36966) Provided verbal/tactile cueing for activities related to improving balance, coordination, kinesthetic sense, posture, motor skill, proprioception to assist with   [] LE / lumbar: LE, proximal hip, and core control in self care, mobility, lifting, ambulation and eccentric single leg control. [] UE / cervical: cervical, scapular, scapulothoracic and UE control with self care, reaching, carrying, lifting, house/yardwork, driving, computer work. [x] (33018) Aquatic therapy with therapeutic exercise. Provided verbal and tactile cueing for activities related to strengthening, flexibility, endurance, ROM for improvements in  [x] LE / lumbar: LE, proximal hip, and core control in self care, mobility, lifting, ambulation and eccentric single leg control.    [x] UE / cervical: cervical, scapular, scapulothoracic and UE control with self care, reaching, carrying, lifting, house/yardwork, driving, computer work.   [] (63828) Therapist is in constant attendance of 2 or more patients providing skilled therapy interventions, but not providing any significant amount of measurable one-on-one time to either patient, for improvements in  [] LE / lumbar: LE, proximal hip, and core control in self care, mobility, lifting, ambulation and eccentric single leg control. [] UE / cervical: cervical, scapular, scapulothoracic and UE control with self care, reaching, carrying, lifting, house/yardwork, driving, computer work.      NMR and Therapeutic Activities:    [] (43960 or 33625) Provided verbal/tactile cueing for activities related to improving balance, coordination, kinesthetic sense, posture, motor skill, proprioception and motor activation to allow for proper function of   [] LE: / Lumbar core, proximal hip and LE with self care and ADLs  [] UE / Cervical: cervical, postural, scapular, scapulothoracic and UE control with self care, carrying, lifting, driving, computer work.   [] (37630) Gait Re-education- Provided training and instruction to the patient for proper LE, core and proximal hip recruitment and positioning and eccentric body weight control with ambulation re-education including up and down stairs     Home Exercise Program:    [] (84672) Reviewed/Progressed HEP activities related to strengthening, flexibility, endurance, ROM of   [] LE / Lumbar: core, proximal hip and LE for functional self-care, mobility, lifting and ambulation/stair navigation   [] UE / Cervical: cervical, postural, scapular, scapulothoracic and UE control with self care, reaching, carrying, lifting, house/yardwork, driving, computer work  [] (87878)Reviewed/Progressed HEP activities related to improving balance, coordination, kinesthetic sense, posture, motor skill, proprioception of   [] LE: core, proximal hip and LE for self care, mobility, lifting, and ambulation/stair navigation    [] UE / Cervical: cervical, postural,  scapular, scapulothoracic and UE control with self care, reaching, carrying, lifting, house/yardwork, driving, computer work    Manual Treatments:  PROM / STM / Oscillations-Mobs:  G-I, II, III, IV (Duyen, Inf., Post.)  [] (55838) Provided manual therapy to mobilize LE, proximal hip and/or LS spine soft tissue/joints for the purpose of modulating pain, promoting relaxation,  increasing ROM, reducing/eliminating soft tissue swelling/inflammation/restriction, improving soft tissue extensibility and allowing for proper ROM for normal function with   [] LE / lumbar: self care, mobility, lifting and ambulation. [] UE / Cervical: self care, reaching, carrying, lifting, house/yardwork, driving, computer work. Modalities:  [] (77003) Vasopneumatic compression: Utilized vasopneumatic compression to decrease edema / swelling for the purpose of improving mobility and quad tone / recruitment which will allow for increased overall function including but not limited to self-care, transfers, ambulation, and ascending / descending stairs. Modalities:      Charges:  Timed Code Treatment Minutes: 40   Total Treatment Minutes: 40     [] EVAL - LOW (49568)   [] EVAL - MOD (16209)  [] EVAL - HIGH (62991)  [] RE-EVAL (98296)  [] TE (65115) x      [] Ionto  [] NMR (40971) x      [] Vaso  [] Manual (78789) x      [] Ultrasound  [] TA x       [] Mech Traction (03680)  [] Gait Training x     [] ES (un) (58784):   [x] Aquatic therapy x 3      [] Other:   [] Group:     GOALS:  Patient stated goal: maximize independence to be able to walk through her house without help and do as much for herself in her home as able.      Therapist goals for Patient:   Short Term Goals: To be achieved in: 2 weeks  1. Independent in HEP and progression per patient tolerance, in order to prevent re-injury. PROGRESSING  2. Patient will have a decrease in pain to facilitate improvement in movement, function, and ADLs as indicated by Functional Deficits. PROGRESSING     Long Term Goals: To be achieved in: 6 weeks  1.  Patient will demonstrate an increase in postural awareness and control to allow for proper functional mobility as indicated by patients Functional Deficits. PROGRESSING  4. Patient will demonstrate an increase in Strength to at least 4/5 to allow for proper functional mobility as indicated by patients Functional Deficits. PROGRESSING  5. Patient will be able to demonstrates household ambulation distances with LRAD and supervision only. PROGRESSING  6. Patient will be able to demonstrate all functional transfers with Mod I. PROGRESSING       Overall Progression Towards Functional goals/ Treatment Progress Update:  [] Patient is progressing as expected towards functional goals listed. [x] Progression is slowed due to complexities/Impairments listed. [] Progression has been slowed due to co-morbidities. [] Plan just implemented, too soon to assess goals progression <30days   [] Goals require adjustment due to lack of progress  [] Patient is not progressing as expected and requires additional follow up with physician  [] Other;  Improvements in lateral side stepping, decreased scissoring, but significant overall fatigue. Had 1 episode of RUE muscle spasm that went away     Persisting Functional Limitations/Impairments:  [x]Sleeping [x]Sitting               [x]Standing [x]Transfers        [x]Walking [x]Kneeling               [x]Stairs [x]Squatting / bending   [x]ADLs [x]Reaching  [x]Lifting  [x]Housework  [x]Driving [x]Job related tasks  [x]Sports/Recreation []Other:        ASSESSMENT: Pt tolerated a longer session than expected this date due to not having therapy for almost a month. Pt needs cueing while ambulating in pool to keep legs underneath of her and to turn fully before sitting. Pt demo'd shortened step length on R LE but was able to correct with cueing.     Treatment/Activity Tolerance:  [x] Patient able to complete tx  [] Patient limited by fatique  [] Patient limited by pain  [x] Patient limited by other medical complications  [] Other:     Prognosis: [] Good [x] Fair  [] Poor    Patient Requires Follow-up: [x] Yes  [] No Plan for next treatment session: Aquatic activity as above 2 x per week, land based PT for 1 x per week    PLAN: See eval. PT 2-3x / week for 6 weeks. [x] Continue per plan of care [] Alter current plan (see comments)  [] Plan of care initiated [] Hold pending MD visit [] Discharge    Electronically signed by:      Courtney Carvalho PT DPT    Note: If patient does not return for scheduled/ recommended follow up visits, his note will serve as a discharge from care along with most recent update on progress.

## 2020-01-27 ENCOUNTER — HOSPITAL ENCOUNTER (OUTPATIENT)
Dept: PHYSICAL THERAPY | Age: 75
Setting detail: THERAPIES SERIES
Discharge: HOME | End: 2020-01-27
Payer: MEDICARE

## 2020-01-27 PROCEDURE — 97113 AQUATIC THERAPY/EXERCISES: CPT

## 2020-01-27 NOTE — FLOWSHEET NOTE
OF POOL VIA CHAIR LIFT; FATIGUES QUICKLY; VARIABLE LE MOVEMENT  AquaticTherapy Dates of Service: 11/6, 11/8, 11/19, 11/21, 12/3, 12/10, 1/24, 1/27   Aquatic Visits Exercises/Activities:  17295 and / or 23983   Transfers:          % Immersion:            Ambulation:   UE Exercises:  1/24, 1/27: done in seated    Forwards  x 2 lap w/ 1 N min A/mod A, x 1 lap end of session Shoulder Shrugs      Lateral   Shoulder Circles      Retro  Scapular Retraction   x 15    Marching  Push Downs       Cariocas   Punching X 15    Jog    Rowing     Multifidi walkouts with paddle   Elbow Flex/Ext X 15       Shldr Flex/Ext X 15      Shldr aBd/aDd X 15   LE Exercises: 1 Shldr Horiz aBd/aDd X 15    HR/TR X 15 Shldr IR/ER    Marches X 15 Arm Circles X 15 cw/ccw   Squats X 15  PNF Diagonals    Hamstring Curls X 15  Wall Push Ups    Hip Flexion (SLR) X 15   Figure 8's    Hip aBduction (SLR) X 15 Bilateral Pull Downs    Hip Extension (SLR) X 15       Hip aDduction (SLR) X      Hip Circles X 15  Functional:    Hip IR/Er X 15 Step up forward    TrA Set   Step up lateral     Pelvic Tilts  X 15 Step down     Fig 8's   Lunges Forward    LE PNF  Lunges Retro      Lunges Lateral     Balance:   Lower ab curl with noodle     SLS       Tandem Stance Semi-Tandem,  2 x 30 sec ea way with fingertips     NBOS eyes open 2 x 30 sec with fingertipsSeated:     NBOS eyes closed  Ankle pumps   Hand to Opposite Knee  Ankle Circles   Fwd Step ups to SLS  Ankle DF/PF   Lateral Step ups to SLS  Ankle Inv/Ev   Stop/Go Gait   Bicycle  2X 20 B   Normal HARIKA  Knee Flex/Ext 2X 20 B     Hip IR/ER   Stretching:   Hip aBd/aDd     Marching    Gastroc/Soleus  SLR   Hamstring   Noodle:     Knee Flex Stretch  Leg Press    Piriformis   Noodle Hang at [employer]    Hip Flexor  Noodle Hang Deep Water    SKTC  Noodle Bicycle     DKTC       ITB      Quad  Deep Water:    Mid Back   Jog    UT  Jumping Jacks    Post Shoulder  Heel to Toe    Ladder Pull  Hand to Opposite Knee    Pec Stretch Rocking Horse      [employer]          Cervical:   Other:     AROM Flexion      AROM Extension      AROM Side Bending      AROM Rotation      Chin Tucks      Chin Nods        Aquatic Abbreviation Key  B= Belt DB= Dumbells T= Theratube   H= Hydrotone N= Noodles W= Weights   P= Paddles S= Speedo equipment K= Kickboard       Pt. Education:  12/18: Extensive discussion with pt today on goals of PT and limitations with pool interventions. Discussed that pool is a good starting point however to maximize functional strength and ambulation progressions, best environment is with land based PT and strongly encouraging pt to continue working on land based session. Pt becoming very frustrated and tearful expressing that in the pool she can work out and does not feels pain or soreness after and is afraid to fully transition out of the pool as therex out of the water tends to significantly increase her pain. After ambulation and pt was able to ambulate significant farther with increased endurance and control as well as improved with RW pt agreeable to continue with PT both pool as well as in clinic with expectation that as long as she is progressing in clinic with minimal symptoms with continue to gradually work to get out of the pool. 11/2/19 Pt was educated on PT POC, Diagnosis, Prognosis, pathomechanics as well as frequency and duration of scheduling future physical therapy appointments. Time was also taken on this day to answer all patient questions and participation in PT. 11/2/19 Pt was educated on aquatic therapy intervention and POC as well as taken on tour of pool area in which pt was shown family changing rooms, how to utilize lockers what to wear for sessions as well as emphasized treatments take place in group setting. Pt was also educated on process of checking in at  and reporting back to pool area taking care with transition to pool area due to wet floors.  Pt was also issued pool handout which improvements in  [] LE / lumbar: LE, proximal hip, and core control in self care, mobility, lifting, ambulation and eccentric single leg control. [] UE / cervical: cervical, scapular, scapulothoracic and UE control with self care, reaching, carrying, lifting, house/yardwork, driving, computer work.      NMR and Therapeutic Activities:    [] (54699 or 21732) Provided verbal/tactile cueing for activities related to improving balance, coordination, kinesthetic sense, posture, motor skill, proprioception and motor activation to allow for proper function of   [] LE: / Lumbar core, proximal hip and LE with self care and ADLs  [] UE / Cervical: cervical, postural, scapular, scapulothoracic and UE control with self care, carrying, lifting, driving, computer work.   [] (55964) Gait Re-education- Provided training and instruction to the patient for proper LE, core and proximal hip recruitment and positioning and eccentric body weight control with ambulation re-education including up and down stairs     Home Exercise Program:    [] (54589) Reviewed/Progressed HEP activities related to strengthening, flexibility, endurance, ROM of   [] LE / Lumbar: core, proximal hip and LE for functional self-care, mobility, lifting and ambulation/stair navigation   [] UE / Cervical: cervical, postural, scapular, scapulothoracic and UE control with self care, reaching, carrying, lifting, house/yardwork, driving, computer work  [] (37322)Reviewed/Progressed HEP activities related to improving balance, coordination, kinesthetic sense, posture, motor skill, proprioception of   [] LE: core, proximal hip and LE for self care, mobility, lifting, and ambulation/stair navigation    [] UE / Cervical: cervical, postural,  scapular, scapulothoracic and UE control with self care, reaching, carrying, lifting, house/yardwork, driving, computer work    Manual Treatments:  PROM / STM / Oscillations-Mobs:  G-I, II, III, IV (PA's, Inf., Post.)  [] (46006) PROGRESSING  4. Patient will demonstrate an increase in Strength to at least 4/5 to allow for proper functional mobility as indicated by patients Functional Deficits. PROGRESSING  5. Patient will be able to demonstrates household ambulation distances with LRAD and supervision only. PROGRESSING  6. Patient will be able to demonstrate all functional transfers with Mod I. PROGRESSING       Overall Progression Towards Functional goals/ Treatment Progress Update:  [] Patient is progressing as expected towards functional goals listed. [x] Progression is slowed due to complexities/Impairments listed. [] Progression has been slowed due to co-morbidities. [] Plan just implemented, too soon to assess goals progression <30days   [] Goals require adjustment due to lack of progress  [] Patient is not progressing as expected and requires additional follow up with physician  [] Other;  Improvements in lateral side stepping, decreased scissoring, but significant overall fatigue. Had 1 episode of RUE muscle spasm that went away     Persisting Functional Limitations/Impairments:  [x]Sleeping [x]Sitting               [x]Standing [x]Transfers        [x]Walking [x]Kneeling               [x]Stairs [x]Squatting / bending   [x]ADLs [x]Reaching  [x]Lifting  [x]Housework  [x]Driving [x]Job related tasks  [x]Sports/Recreation []Other:        ASSESSMENT: Continued cues this date to for patient to keep legs underneath of her while ambulating. Tactile cueing given to patient to stabilize with core and glutes while walking.      Treatment/Activity Tolerance:  [x] Patient able to complete tx  [] Patient limited by fatique  [] Patient limited by pain  [x] Patient limited by other medical complications  [] Other:     Prognosis: [] Good [x] Fair  [] Poor    Patient Requires Follow-up: [x] Yes  [] No         Plan for next treatment session: Aquatic activity as above 2 x per week, land based PT for 1 x per week    PLAN: See eval. PT 2-3x / week

## 2020-01-29 ENCOUNTER — HOSPITAL ENCOUNTER (OUTPATIENT)
Dept: PHYSICAL THERAPY | Age: 75
Setting detail: THERAPIES SERIES
Discharge: HOME | End: 2020-01-29
Payer: MEDICARE

## 2020-01-29 NOTE — PROGRESS NOTES
Physical Therapy  Cancellation/No-show Note  Patient Name:  Gavino Shukla  :  1945   Date:  2020  Cancelled visits to date: 4  No-shows to date: 1    Patient status for today's appointment patient:  [x]  Cancelled , , ,   []  Rescheduled appointment  []  No-show         Reason given by patient:  []  Patient ill - has the flu  []  Conflicting appointment  []  No transportation    []  Conflict with work  []  No reason given  [x]  Other:     Comments:  Daughter was sick, unable to get to PT due to lack of transportation.        Phone call information:   []  Phone call made today to patient at 12:40 pm at number provided:      []  Patient answered, conversation as follows:    []  Patient did not answer, message left as follows:    [x]  Phone call not made today    Electronically signed by:  Teri Linares, PT

## 2020-01-31 ENCOUNTER — TELEPHONE (OUTPATIENT)
Dept: PULMONOLOGY | Age: 75
End: 2020-01-31

## 2020-01-31 ENCOUNTER — APPOINTMENT (OUTPATIENT)
Dept: PHYSICAL THERAPY | Age: 75
End: 2020-01-31
Payer: MEDICARE

## 2020-01-31 NOTE — TELEPHONE ENCOUNTER
Pt called in asking if we can send her records to Dr. Rachel De Santiago in [place]. Request sent to [facility] 1/31/2020.

## 2020-02-07 ENCOUNTER — HOSPITAL ENCOUNTER (OUTPATIENT)
Dept: PHYSICAL THERAPY | Age: 75
Setting detail: THERAPIES SERIES
Discharge: HOME | End: 2020-02-07
Payer: MEDICARE

## 2020-03-06 ENCOUNTER — HOSPITAL ENCOUNTER (OUTPATIENT)
Dept: WOMENS IMAGING | Age: 75
Discharge: HOME | End: 2020-03-06
Payer: MEDICARE

## 2020-03-06 PROCEDURE — G0279 TOMOSYNTHESIS, MAMMO: HCPCS

## 2020-06-26 ENCOUNTER — HOSPITAL ENCOUNTER (OUTPATIENT)
Dept: CT IMAGING | Age: 75
Discharge: HOME | End: 2020-06-26
Payer: MEDICARE

## 2020-06-26 PROCEDURE — 71250 CT THORAX DX C-: CPT

## 2020-07-21 ENCOUNTER — TELEPHONE (OUTPATIENT)
Dept: VASCULAR SURGERY | Age: 75
End: 2020-07-21

## 2020-11-03 PROBLEM — J44.9 COPD (CHRONIC OBSTRUCTIVE PULMONARY DISEASE) (HCC): Status: RESOLVED | Noted: 2020-11-03 | Resolved: 2020-11-03

## 2021-01-01 ENCOUNTER — HOSPITAL ENCOUNTER (EMERGENCY)
Age: 76
Discharge: ANOTHER HOSPITAL | End: 2021-04-05
Attending: STUDENT IN AN ORGANIZED HEALTH CARE EDUCATION/TRAINING PROGRAM
Payer: MEDICARE

## 2021-01-01 ENCOUNTER — APPOINTMENT (OUTPATIENT)
Dept: GENERAL RADIOLOGY | Age: 76
End: 2021-01-01
Payer: MEDICARE

## 2021-01-01 ENCOUNTER — HOSPITAL ENCOUNTER (OUTPATIENT)
Dept: VASCULAR LAB | Age: 76
Discharge: HOME | End: 2021-09-07
Payer: MEDICARE

## 2021-01-01 ENCOUNTER — TELEPHONE (OUTPATIENT)
Dept: VASCULAR SURGERY | Age: 76
End: 2021-01-01

## 2021-01-01 ENCOUNTER — APPOINTMENT (OUTPATIENT)
Dept: CT IMAGING | Age: 76
End: 2021-01-01
Payer: MEDICARE

## 2021-01-01 ENCOUNTER — HOSPITAL ENCOUNTER (EMERGENCY)
Age: 76
Discharge: HOME | End: 2021-02-01
Payer: MEDICARE

## 2021-01-01 ENCOUNTER — HOSPITAL ENCOUNTER (OUTPATIENT)
Dept: CT IMAGING | Age: 76
Discharge: HOME | End: 2021-09-07
Payer: MEDICARE

## 2021-01-01 ENCOUNTER — HOSPITAL ENCOUNTER (OUTPATIENT)
Dept: GENERAL RADIOLOGY | Age: 76
Discharge: HOME | End: 2021-05-18
Payer: MEDICARE

## 2021-01-01 ENCOUNTER — HOSPITAL ENCOUNTER (OUTPATIENT)
Age: 76
Discharge: HOME | End: 2021-09-07
Payer: MEDICARE

## 2021-01-01 ENCOUNTER — OFFICE VISIT (OUTPATIENT)
Dept: VASCULAR SURGERY | Age: 76
End: 2021-01-01
Payer: MEDICARE

## 2021-01-01 ENCOUNTER — HOSPITAL ENCOUNTER (OUTPATIENT)
Dept: WOMENS IMAGING | Age: 76
Discharge: HOME | End: 2021-05-18
Payer: MEDICARE

## 2021-01-01 ENCOUNTER — CARE COORDINATION (OUTPATIENT)
Dept: CARE COORDINATION | Age: 76
End: 2021-01-01

## 2021-01-01 VITALS
HEIGHT: 65 IN | WEIGHT: 148 LBS | TEMPERATURE: 97.8 F | DIASTOLIC BLOOD PRESSURE: 73 MMHG | HEART RATE: 71 BPM | OXYGEN SATURATION: 94 % | BODY MASS INDEX: 24.66 KG/M2 | RESPIRATION RATE: 20 BRPM | SYSTOLIC BLOOD PRESSURE: 134 MMHG

## 2021-01-01 VITALS — BODY MASS INDEX: 26.66 KG/M2 | HEIGHT: 65 IN | WEIGHT: 160 LBS

## 2021-01-01 VITALS
OXYGEN SATURATION: 95 % | DIASTOLIC BLOOD PRESSURE: 80 MMHG | RESPIRATION RATE: 18 BRPM | HEART RATE: 68 BPM | SYSTOLIC BLOOD PRESSURE: 167 MMHG | TEMPERATURE: 97.4 F

## 2021-01-01 DIAGNOSIS — I65.23 CAROTID ATHEROSCLEROSIS, BILATERAL: Primary | ICD-10-CM

## 2021-01-01 DIAGNOSIS — I73.9 PVD (PERIPHERAL VASCULAR DISEASE) (HCC): Primary | ICD-10-CM

## 2021-01-01 DIAGNOSIS — Z20.822 SUSPECTED COVID-19 VIRUS INFECTION: ICD-10-CM

## 2021-01-01 DIAGNOSIS — I65.23 BILATERAL CAROTID ARTERY STENOSIS: ICD-10-CM

## 2021-01-01 DIAGNOSIS — J18.9 PNEUMONIA DUE TO ORGANISM: Primary | ICD-10-CM
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
## 2021-01-01 DIAGNOSIS — S22.41XA CLOSED FRACTURE OF MULTIPLE RIBS OF RIGHT SIDE, INITIAL ENCOUNTER: ICD-10-CM

## 2021-01-01 DIAGNOSIS — Z12.31 BREAST CANCER SCREENING BY MAMMOGRAM: ICD-10-CM

## 2021-01-01 DIAGNOSIS — I71.20 THORACIC AORTIC ANEURYSM WITHOUT RUPTURE: ICD-10-CM

## 2021-01-01 DIAGNOSIS — Z85.3 PERSONAL HISTORY OF BREAST CANCER: ICD-10-CM

## 2021-01-01 DIAGNOSIS — I65.23 BILATERAL CAROTID ARTERY STENOSIS: Primary | ICD-10-CM

## 2021-01-01 DIAGNOSIS — M81.0 OSTEOPOROSIS, POSTMENOPAUSAL: ICD-10-CM

## 2021-01-01 LAB
A/G RATIO: 1.4 (ref 1.1–2.2)
ABO/RH: NORMAL
ALBUMIN SERPL-MCNC: 3.8 G/DL (ref 3.4–5)
ALP BLD-CCNC: 95 U/L (ref 40–129)
ALT SERPL-CCNC: 10 U/L (ref 10–40)
ANION GAP SERPL CALCULATED.3IONS-SCNC: 11 MMOL/L (ref 3–16)
ANION GAP SERPL CALCULATED.3IONS-SCNC: 8 MMOL/L (ref 3–16)
ANTIBODY SCREEN: NORMAL
APTT: 34.5 SEC (ref 24.2–36.2)
AST SERPL-CCNC: 9 U/L (ref 15–37)
BASOPHILS ABSOLUTE: 0.1 K/UL (ref 0–0.2)
BASOPHILS ABSOLUTE: 0.1 K/UL (ref 0–0.2)
BASOPHILS RELATIVE PERCENT: 0.5 %
BASOPHILS RELATIVE PERCENT: 0.6 %
BILIRUB SERPL-MCNC: 0.3 MG/DL (ref 0–1)
BUN BLDV-MCNC: 10 MG/DL (ref 7–20)
BUN BLDV-MCNC: 11 MG/DL (ref 7–20)
BUN BLDV-MCNC: 7 MG/DL (ref 7–20)
CALCIUM SERPL-MCNC: 8.4 MG/DL (ref 8.3–10.6)
CALCIUM SERPL-MCNC: 8.7 MG/DL (ref 8.3–10.6)
CHLORIDE BLD-SCNC: 103 MMOL/L (ref 99–110)
CHLORIDE BLD-SCNC: 105 MMOL/L (ref 99–110)
CO2: 22 MMOL/L (ref 21–32)
CO2: 26 MMOL/L (ref 21–32)
CREAT SERPL-MCNC: 0.7 MG/DL (ref 0.6–1.2)
EKG ATRIAL RATE: 68 BPM
EKG DIAGNOSIS: NORMAL
EKG P AXIS: 16 DEGREES
EKG P-R INTERVAL: 202 MS
EKG Q-T INTERVAL: 398 MS
EKG QRS DURATION: 100 MS
EKG QTC CALCULATION (BAZETT): 423 MS
EKG R AXIS: -40 DEGREES
EKG T AXIS: 25 DEGREES
EKG VENTRICULAR RATE: 68 BPM
EOSINOPHILS ABSOLUTE: 0 K/UL (ref 0–0.6)
EOSINOPHILS ABSOLUTE: 0.2 K/UL (ref 0–0.6)
EOSINOPHILS RELATIVE PERCENT: 0.3 %
EOSINOPHILS RELATIVE PERCENT: 1.9 %
GFR AFRICAN AMERICAN: >60
GFR NON-AFRICAN AMERICAN: >60
GLOBULIN: 2.7 G/DL
GLUCOSE BLD-MCNC: 143 MG/DL (ref 70–99)
GLUCOSE BLD-MCNC: 164 MG/DL (ref 70–99)
HCT VFR BLD CALC: 43.3 % (ref 36–48)
HCT VFR BLD CALC: 46 % (ref 36–48)
HEMOGLOBIN: 14 G/DL (ref 12–16)
HEMOGLOBIN: 15.1 G/DL (ref 12–16)
INR BLD: 1.23 (ref 0.86–1.14)
LYMPHOCYTES ABSOLUTE: 1.1 K/UL (ref 1–5.1)
LYMPHOCYTES ABSOLUTE: 2.2 K/UL (ref 1–5.1)
LYMPHOCYTES RELATIVE PERCENT: 16.3 %
LYMPHOCYTES RELATIVE PERCENT: 8.8 %
MCH RBC QN AUTO: 30.6 PG (ref 26–34)
MCH RBC QN AUTO: 31.1 PG (ref 26–34)
MCHC RBC AUTO-ENTMCNC: 32.2 G/DL (ref 31–36)
MCHC RBC AUTO-ENTMCNC: 32.7 G/DL (ref 31–36)
MCV RBC AUTO: 94.9 FL (ref 80–100)
MCV RBC AUTO: 95 FL (ref 80–100)
MONOCYTES ABSOLUTE: 0.6 K/UL (ref 0–1.3)
MONOCYTES ABSOLUTE: 0.6 K/UL (ref 0–1.3)
MONOCYTES RELATIVE PERCENT: 4.4 %
MONOCYTES RELATIVE PERCENT: 5 %
NEUTROPHILS ABSOLUTE: 10.4 K/UL (ref 1.7–7.7)
NEUTROPHILS ABSOLUTE: 10.7 K/UL (ref 1.7–7.7)
NEUTROPHILS RELATIVE PERCENT: 78.4 %
NEUTROPHILS RELATIVE PERCENT: 83.8 %
PDW BLD-RTO: 14.1 % (ref 12.4–15.4)
PDW BLD-RTO: 14.4 % (ref 12.4–15.4)
PLATELET # BLD: 162 K/UL (ref 135–450)
PLATELET # BLD: 210 K/UL (ref 135–450)
PMV BLD AUTO: 10.5 FL (ref 5–10.5)
PMV BLD AUTO: 10.6 FL (ref 5–10.5)
POTASSIUM SERPL-SCNC: 3.5 MMOL/L (ref 3.5–5.1)
POTASSIUM SERPL-SCNC: 3.6 MMOL/L (ref 3.5–5.1)
PRO-BNP: 405 PG/ML (ref 0–449)
PROTHROMBIN TIME: 14.3 SEC (ref 10–13.2)
RBC # BLD: 4.56 M/UL (ref 4–5.2)
RBC # BLD: 4.85 M/UL (ref 4–5.2)
SARS-COV-2, PCR: NOT DETECTED
SODIUM BLD-SCNC: 136 MMOL/L (ref 136–145)
SODIUM BLD-SCNC: 139 MMOL/L (ref 136–145)
TOTAL PROTEIN: 6.5 G/DL (ref 6.4–8.2)
TROPONIN: <0.01 NG/ML
WBC # BLD: 12.8 K/UL (ref 4–11)
WBC # BLD: 13.3 K/UL (ref 4–11)

## 2021-01-01 PROCEDURE — 85610 PROTHROMBIN TIME: CPT

## 2021-01-01 PROCEDURE — 84484 ASSAY OF TROPONIN QUANT: CPT

## 2021-01-01 PROCEDURE — 93880 EXTRACRANIAL BILAT STUDY: CPT

## 2021-01-01 PROCEDURE — 72125 CT NECK SPINE W/O DYE: CPT

## 2021-01-01 PROCEDURE — 96375 TX/PRO/DX INJ NEW DRUG ADDON: CPT

## 2021-01-01 PROCEDURE — 6360000004 HC RX CONTRAST MEDICATION: Performed by: SURGERY

## 2021-01-01 PROCEDURE — 1090F PRES/ABSN URINE INCON ASSESS: CPT | Performed by: SURGERY

## 2021-01-01 PROCEDURE — G8417 CALC BMI ABV UP PARAM F/U: HCPCS | Performed by: SURGERY

## 2021-01-01 PROCEDURE — 93010 ELECTROCARDIOGRAM REPORT: CPT | Performed by: INTERNAL MEDICINE

## 2021-01-01 PROCEDURE — 99213 OFFICE O/P EST LOW 20 MIN: CPT | Performed by: SURGERY

## 2021-01-01 PROCEDURE — 86901 BLOOD TYPING SEROLOGIC RH(D): CPT

## 2021-01-01 PROCEDURE — 85025 COMPLETE CBC W/AUTO DIFF WBC: CPT

## 2021-01-01 PROCEDURE — 71275 CT ANGIOGRAPHY CHEST: CPT

## 2021-01-01 PROCEDURE — 84520 ASSAY OF UREA NITROGEN: CPT

## 2021-01-01 PROCEDURE — 73030 X-RAY EXAM OF SHOULDER: CPT

## 2021-01-01 PROCEDURE — 2580000003 HC RX 258: Performed by: PHYSICIAN ASSISTANT

## 2021-01-01 PROCEDURE — G8399 PT W/DXA RESULTS DOCUMENT: HCPCS | Performed by: SURGERY

## 2021-01-01 PROCEDURE — 83880 ASSAY OF NATRIURETIC PEPTIDE: CPT

## 2021-01-01 PROCEDURE — 71260 CT THORAX DX C+: CPT

## 2021-01-01 PROCEDURE — 6370000000 HC RX 637 (ALT 250 FOR IP): Performed by: STUDENT IN AN ORGANIZED HEALTH CARE EDUCATION/TRAINING PROGRAM

## 2021-01-01 PROCEDURE — 99284 EMERGENCY DEPT VISIT MOD MDM: CPT

## 2021-01-01 PROCEDURE — 86900 BLOOD TYPING SEROLOGIC ABO: CPT

## 2021-01-01 PROCEDURE — 36415 COLL VENOUS BLD VENIPUNCTURE: CPT

## 2021-01-01 PROCEDURE — 82565 ASSAY OF CREATININE: CPT

## 2021-01-01 PROCEDURE — 80053 COMPREHEN METABOLIC PANEL: CPT

## 2021-01-01 PROCEDURE — 6360000002 HC RX W HCPCS: Performed by: PHYSICIAN ASSISTANT

## 2021-01-01 PROCEDURE — 3017F COLORECTAL CA SCREEN DOC REV: CPT | Performed by: SURGERY

## 2021-01-01 PROCEDURE — 6360000004 HC RX CONTRAST MEDICATION: Performed by: PHYSICIAN ASSISTANT

## 2021-01-01 PROCEDURE — 6370000000 HC RX 637 (ALT 250 FOR IP): Performed by: PHYSICIAN ASSISTANT

## 2021-01-01 PROCEDURE — 4004F PT TOBACCO SCREEN RCVD TLK: CPT | Performed by: SURGERY

## 2021-01-01 PROCEDURE — U0003 INFECTIOUS AGENT DETECTION BY NUCLEIC ACID (DNA OR RNA); SEVERE ACUTE RESPIRATORY SYNDROME CORONAVIRUS 2 (SARS-COV-2) (CORONAVIRUS DISEASE [COVID-19]), AMPLIFIED PROBE TECHNIQUE, MAKING USE OF HIGH THROUGHPUT TECHNOLOGIES AS DESCRIBED BY CMS-2020-01-R: HCPCS

## 2021-01-01 PROCEDURE — G8427 DOCREV CUR MEDS BY ELIG CLIN: HCPCS | Performed by: SURGERY

## 2021-01-01 PROCEDURE — 93005 ELECTROCARDIOGRAM TRACING: CPT | Performed by: PHYSICIAN ASSISTANT

## 2021-01-01 PROCEDURE — 80048 BASIC METABOLIC PNL TOTAL CA: CPT

## 2021-01-01 PROCEDURE — 77063 BREAST TOMOSYNTHESIS BI: CPT

## 2021-01-01 PROCEDURE — 1123F ACP DISCUSS/DSCN MKR DOCD: CPT | Performed by: SURGERY

## 2021-01-01 PROCEDURE — 86850 RBC ANTIBODY SCREEN: CPT

## 2021-01-01 PROCEDURE — 85730 THROMBOPLASTIN TIME PARTIAL: CPT

## 2021-01-01 PROCEDURE — 96365 THER/PROPH/DIAG IV INF INIT: CPT

## 2021-01-01 PROCEDURE — 71045 X-RAY EXAM CHEST 1 VIEW: CPT

## 2021-01-01 PROCEDURE — 70450 CT HEAD/BRAIN W/O DYE: CPT

## 2021-01-01 PROCEDURE — 4040F PNEUMOC VAC/ADMIN/RCVD: CPT | Performed by: SURGERY

## 2021-01-01 PROCEDURE — 99283 EMERGENCY DEPT VISIT LOW MDM: CPT

## 2021-01-01 PROCEDURE — 77080 DXA BONE DENSITY AXIAL: CPT

## 2021-01-01 RX ORDER — AZITHROMYCIN 250 MG/1
TABLET, FILM COATED ORAL
Qty: 1 PACKET | Refills: 0 | Status: SHIPPED | OUTPATIENT
Start: 2021-01-01 | End: 2021-01-01

## 2021-01-01 RX ORDER — ONDANSETRON 4 MG/1
8 TABLET, ORALLY DISINTEGRATING ORAL ONCE
Status: COMPLETED | OUTPATIENT
Start: 2021-01-01 | End: 2021-01-01

## 2021-01-01 RX ORDER — NICOTINE 21 MG/24HR
1 PATCH, TRANSDERMAL 24 HOURS TRANSDERMAL DAILY
Status: DISCONTINUED | OUTPATIENT
Start: 2021-01-01 | End: 2021-01-01 | Stop reason: HOSPADM

## 2021-01-01 RX ORDER — PREDNISONE 10 MG/1
40 TABLET ORAL DAILY
Qty: 20 TABLET | Refills: 0 | Status: SHIPPED | OUTPATIENT
Start: 2021-01-01 | End: 2021-01-01

## 2021-01-01 RX ORDER — ALBUTEROL SULFATE 90 UG/1
2 AEROSOL, METERED RESPIRATORY (INHALATION) EVERY 6 HOURS PRN
Qty: 3 INHALER | Refills: 0 | Status: SHIPPED | OUTPATIENT
Start: 2021-01-01

## 2021-01-01 RX ORDER — HYDROCODONE BITARTRATE AND ACETAMINOPHEN 5; 325 MG/1; MG/1
1 TABLET ORAL EVERY 4 HOURS PRN
Status: DISCONTINUED | OUTPATIENT
Start: 2021-01-01 | End: 2021-01-01 | Stop reason: HOSPADM

## 2021-01-01 RX ORDER — HYDROCODONE BITARTRATE AND ACETAMINOPHEN 5; 325 MG/1; MG/1
1 TABLET ORAL ONCE
Status: COMPLETED | OUTPATIENT
Start: 2021-01-01 | End: 2021-01-01

## 2021-01-01 RX ADMIN — HYDROCODONE BITARTRATE AND ACETAMINOPHEN 1 TABLET: 5; 325 TABLET ORAL at 17:03

## 2021-01-01 RX ADMIN — ONDANSETRON 8 MG: 4 TABLET, ORALLY DISINTEGRATING ORAL at 14:10

## 2021-01-01 RX ADMIN — IOPAMIDOL 75 ML: 755 INJECTION, SOLUTION INTRAVENOUS at 15:24

## 2021-01-01 RX ADMIN — IOPAMIDOL 75 ML: 755 INJECTION, SOLUTION INTRAVENOUS at 13:07

## 2021-01-01 RX ADMIN — AZITHROMYCIN MONOHYDRATE 500 MG: 500 INJECTION, POWDER, LYOPHILIZED, FOR SOLUTION INTRAVENOUS at 17:02

## 2021-01-01 RX ADMIN — Medication 1000 MG: at 17:02

## 2021-01-01 RX ADMIN — HYDROCODONE BITARTRATE AND ACETAMINOPHEN 1 TABLET: 5; 325 TABLET ORAL at 14:10

## 2021-01-01 ASSESSMENT — ENCOUNTER SYMPTOMS
COUGH: 0
BACK PAIN: 0
ABDOMINAL PAIN: 1
ABDOMINAL PAIN: 0
CHEST TIGHTNESS: 1
NAUSEA: 0
VOMITING: 0
VOICE CHANGE: 0
WHEEZING: 0
CONSTIPATION: 0
ABDOMINAL DISTENTION: 0
SHORTNESS OF BREATH: 0
SHORTNESS OF BREATH: 1
COLOR CHANGE: 0
PHOTOPHOBIA: 0
DIARRHEA: 0
COUGH: 1
STRIDOR: 0
TROUBLE SWALLOWING: 0
SORE THROAT: 0
VOMITING: 0
BACK PAIN: 1
RESPIRATORY NEGATIVE: 1
NAUSEA: 0
CHEST TIGHTNESS: 0
COLOR CHANGE: 0

## 2021-01-01 ASSESSMENT — PAIN DESCRIPTION - DESCRIPTORS: DESCRIPTORS: STABBING

## 2021-01-01 ASSESSMENT — PAIN DESCRIPTION - PAIN TYPE: TYPE: CHRONIC PAIN

## 2021-01-01 ASSESSMENT — PAIN DESCRIPTION - FREQUENCY: FREQUENCY: CONTINUOUS

## 2021-01-01 ASSESSMENT — PAIN DESCRIPTION - LOCATION: LOCATION: BACK

## 2021-01-01 ASSESSMENT — PAIN SCALES - GENERAL: PAINLEVEL_OUTOF10: 9

## 2021-01-15 DIAGNOSIS — Z01.812 PRE-PROCEDURE LAB EXAM: Primary | ICD-10-CM

## 2021-01-25 ENCOUNTER — HOSPITAL ENCOUNTER (OUTPATIENT)
Age: 76
Discharge: HOME | End: 2021-01-25
Payer: MEDICARE

## 2021-01-25 DIAGNOSIS — Z01.812 PRE-PROCEDURE LAB EXAM: ICD-10-CM

## 2021-01-25 LAB
ANION GAP SERPL CALCULATED.3IONS-SCNC: 9 MMOL/L (ref 3–16)
BUN BLDV-MCNC: 9 MG/DL (ref 7–20)
CALCIUM SERPL-MCNC: 8.9 MG/DL (ref 8.3–10.6)
CHLORIDE BLD-SCNC: 103 MMOL/L (ref 99–110)
CO2: 26 MMOL/L (ref 21–32)
CREAT SERPL-MCNC: 0.7 MG/DL (ref 0.6–1.2)
GFR AFRICAN AMERICAN: >60
GFR NON-AFRICAN AMERICAN: >60
GLUCOSE BLD-MCNC: 95 MG/DL (ref 70–99)
POTASSIUM SERPL-SCNC: 4 MMOL/L (ref 3.5–5.1)
SODIUM BLD-SCNC: 138 MMOL/L (ref 136–145)

## 2021-01-25 PROCEDURE — 36415 COLL VENOUS BLD VENIPUNCTURE: CPT

## 2021-01-25 PROCEDURE — 80048 BASIC METABOLIC PNL TOTAL CA: CPT

## 2021-01-29 ENCOUNTER — HOSPITAL ENCOUNTER (OUTPATIENT)
Dept: CT IMAGING | Age: 76
Discharge: HOME | End: 2021-01-29
Payer: MEDICARE

## 2021-01-29 ENCOUNTER — HOSPITAL ENCOUNTER (OUTPATIENT)
Dept: VASCULAR LAB | Age: 76
Discharge: HOME | End: 2021-01-29
Payer: MEDICARE

## 2021-01-29 DIAGNOSIS — I71.20 THORACIC AORTIC ANEURYSM WITHOUT RUPTURE: ICD-10-CM

## 2021-01-29 DIAGNOSIS — I65.23 ATHEROSCLEROSIS OF BOTH CAROTID ARTERIES: ICD-10-CM

## 2021-01-29 PROCEDURE — 93880 EXTRACRANIAL BILAT STUDY: CPT

## 2021-01-29 PROCEDURE — 6360000004 HC RX CONTRAST MEDICATION: Performed by: SURGERY

## 2021-01-29 PROCEDURE — 71275 CT ANGIOGRAPHY CHEST: CPT

## 2021-01-29 RX ADMIN — IOPAMIDOL 75 ML: 755 INJECTION, SOLUTION INTRAVENOUS at 17:09

## 2021-02-01 NOTE — ED PROVIDER NOTES
[address] [facility]        Pt Name: Andreas Ibrahim  MRN: [id number]  Birthdate 1945  Date of evaluation: 2/1/2021  Provider: Paresh Gray PA-C  PCP: Aruna Roibn MD     I have seen and evaluated this patient with my supervising physician No att. providers found. CHIEF COMPLAINT       Chief Complaint   Patient presents with    Concern For COVID-19     Sent by PCP/lung doctor rt need for covid test.  Chest tightness/SOB lasting one week in duration. Hx of lung cancer. HISTORY OF PRESENT ILLNESS   (Location, Timing/Onset, Context/Setting, Quality, Duration, Modifying Factors, Severity, Associated Signs and Symptoms)  Note limiting factors. Andreas Ibrahim is a 77 y.o. female with history of Buerger's disease, COPD, lung cancer, aortic aneurysm who presents to the emergency department with complaints of cough, congestion, shortness of breath and chest tightness for 1 week. Her vascular surgeon ordered a CT scan of the chest a few days ago which showed groundglass opacities and her pulmonologist recommended that she come to the emergency department for a Covid test.  Patient rates her chest tightness to be a 7 out of 10 on pain scale. Denies hemoptysis, palpitations, headache, dizziness, abdominal pain, pain or swelling in extremities. CTA chest 1/29/21  Unchanged thoracic aneurysm since June 2020.       New extensive bilateral pulmonary disease with new ground-glass opacities and   new subcentimeter nodules. Poornima Hemming are most likely infectious or inflammatory   in etiology, follow-up chest CT in 3 months recommended to ensure resolution   however       Advanced emphysema         Nursing Notes were all reviewed and agreed with or any disagreements were addressed in the HPI. REVIEW OF SYSTEMS    (2-9 systems for level 4, 10 or more for level 5)     Review of Systems   Constitutional: Positive for fatigue.  Negative for chills and fever. HENT: Positive for congestion. Negative for sore throat, tinnitus, trouble swallowing and voice change. Eyes: Negative for visual disturbance. Respiratory: Positive for cough, chest tightness and shortness of breath. Negative for wheezing and stridor. Cardiovascular: Negative for palpitations and leg swelling. Gastrointestinal: Negative for abdominal distention, abdominal pain, nausea and vomiting. Musculoskeletal: Negative for back pain, neck pain and neck stiffness. Skin: Negative for color change, pallor, rash and wound. Neurological: Negative for dizziness, tremors, seizures, syncope, facial asymmetry, speech difficulty, weakness, light-headedness, numbness and headaches. Psychiatric/Behavioral: Negative for confusion. All other systems reviewed and are negative. Positives and Pertinent negatives as per HPI. Except as noted above in the ROS, all other systems were reviewed and negative.        PAST MEDICAL HISTORY     Past Medical History:   Diagnosis Date    Aortic aneurysm [facility])     monitored by Dr. Coby Jacob Buerger's disease [facility])     Cancer of vulva ([facility] 2008    microscopic invasive squamous carcinoma vulva    Cataract     COPD (chronic obstructive pulmonary disease) ([facility]     Emphysema (subcutaneous) (surgical) resulting from a procedure     History of radiation therapy      Completed external beam radiation therapy 04/23/14 total 5000 cGy to the right breast.     Hypertension     Lung bullae ([facility]     monitored by Dr. Yasmani Gilmore vascular dis          SURGICAL HISTORY     Past Surgical History:   Procedure Laterality Date    BREAST LUMPECTOMY Right 2/6/14    BREAST SURGERY      tumors removed    BRONCHOSCOPY  01/25/2018    BAL    EYE SURGERY      lasik    JOINT REPLACEMENT Bilateral     right and left KNEE    KNEE ARTHROSCOPY      KNEE SURGERY  9/1/10    REMOVAL OF RETAINED ANTIBIOTIC SPACERS,LEFT KNEE DEBRIDEMENT ANSD SYNOVECTOMY WITH FROZEN SECTION. LEFT KNEE PLACEMENT OF ANTIBIOTIC SPACER LEFT KNEE    OTHER SURGICAL HISTORY  3-2010    subclavian stent left    OTHER SURGICAL HISTORY      stents bilateral femoral arteries    OTHER SURGICAL HISTORY Left 2/16/15    excision of left vulva lesion    IN THORSC DX LUNGS/PERICAR/MED/PLEURAL SPACE W/O BX Left 11/2/2018    LEFT VIDEO ASSISTED THORACOSCOPY, WEDGE RESECTION, MEDIASTINAL LYMPH NODE DISSECTION performed by Zonia Duval MD at [address]      stent each groin    VULVECTOMY      History of radical vulvectomy with a left inguinal lymph node dissection November 2008. [address]       Discharge Medication List as of 2/1/2021  7:41 PM      CONTINUE these medications which have NOT CHANGED    Details   levalbuterol (XOPENEX) 0.63 MG/3ML nebulization TAKE 3 MLS BY MOUTH VIA NEBULIZER EVERY 4 HOURS AS NEEDED FOR WHEEZING, Disp-1080 mL, R-0**Patient requests 90 days supply**Normal      fluticasone-salmeterol (ADVAIR HFA) 230-21 MCG/ACT inhaler INHALE 2 PUFFS BY MOUTH TWICE DAILY, Disp-1 Inhaler, R-2Phone In      XARELTO 20 MG TABS tablet TK 1 T PO QAM, R-2, DAWHistorical Med      DULoxetine (CYMBALTA) 30 MG extended release capsule Take 30 mg by mouth dailyHistorical Med      !!  Umeclidinium Bromide (INCRUSE ELLIPTA) 62.5 MCG/INH AEPB Inhale 1 puff into the lungs daily, Disp-1 each, R-3Normal      !! INCRUSE ELLIPTA 62.5 MCG/INH AEPB INHALE 1 PUFF BY MOUTH DAILY AS DIRECTED, Disp-1 each, R-6Normal      ibuprofen (ADVIL;MOTRIN) 400 MG tablet Take 1 tablet by mouth every 6 hours as needed for Pain (incisional), Disp-90 tablet, R-3Print      amLODIPine (NORVASC) 5 MG tablet Take 5 mg by mouth dailyHistorical Med      exemestane (AROMASIN) 25 MG chemo tablet TAKE 1 TABLET BY MOUTH ONCE A DAY FOR BREAST CANCER, Disp-90 tablet, R-0Normal      OXYGEN Inhale into the lungs At night prnHistorical Med      HYDROcodone-acetaminophen (NORCO)  MG per tablet Take 1 tablet by mouth every 6 hours as needed for Pain., Disp-15 tablet, R-0      bisacodyl (DULCOLAX) 5 MG EC tablet Take 5 mg by mouth nightly as needed for Constipation. levothyroxine (SYNTHROID) 50 MCG tablet Take 50 mcg by mouth Daily. guaifenesin (MUCINEX) 600 MG SR tablet Take 800 mg by mouth 3 times daily. potassium chloride SA (K-DUR;KLOR-CON) 20 MEQ tablet Take 20 mEq by mouth daily Historical Med      pregabalin (LYRICA) 150 MG capsule Take 150 mg by mouth 2 times daily. rosuvastatin (CRESTOR) 10 MG tablet Take 20 mg by mouth daily. lisinopril (PRINIVIL;ZESTRIL) 10 MG tablet Take 20 mg by mouth daily Historical Med      lorazepam (ATIVAN) 1 MG tablet Take 3 mg by mouth every evening. fluticasone (FLONASE) 50 MCG/ACT nasal spray 2 sprays by Nasal route daily. !! - Potential duplicate medications found. Please discuss with provider. ALLERGIES     Codeine and Penicillins    FAMILYHISTORY       Family History   Problem Relation Age of Onset    Cancer Mother         cervical    Stroke Sister     Heart Disease Brother     Breast Cancer Maternal Aunt         x2          SOCIAL HISTORY       Social History     Tobacco Use    Smoking status: Current Every Day Smoker     Packs/day: 2.00     Years: 57.00     Pack years: 114.00     Types: Cigarettes    Smokeless tobacco: Never Used    Tobacco comment: started to smoke at 16 / smoked up to 2 ppd / now smoking 0.50 ppd cigarettes   Substance Use Topics    Alcohol use: No    Drug use: Yes     Comment: marijuana       SCREENINGS             PHYSICAL EXAM    (up to 7 for level 4, 8 or more for level 5)     ED Triage Vitals   BP Temp Temp src Pulse Resp SpO2 Height Weight   02/01/21 1753 02/01/21 1757 -- 02/01/21 1753 02/01/21 1753 02/01/21 1753 -- --   (!) 167/80 97.4 °F (36.3 °C)  68 18 95 %         Physical Exam  Vitals signs and nursing note reviewed. Constitutional:       Appearance: Normal appearance. She is well-developed. She is not toxic-appearing or diaphoretic. HENT:      Head: Normocephalic and atraumatic. Right Ear: External ear normal.      Left Ear: External ear normal.      Nose: Nose normal.      Mouth/Throat:      Mouth: Mucous membranes are moist.      Pharynx: Oropharynx is clear. Eyes:      General: No scleral icterus. Right eye: No discharge. Left eye: No discharge. Extraocular Movements: Extraocular movements intact. Conjunctiva/sclera: Conjunctivae normal.      Pupils: Pupils are equal, round, and reactive to light. Neck:      Musculoskeletal: Normal range of motion. Cardiovascular:      Rate and Rhythm: Normal rate. Pulmonary:      Effort: Pulmonary effort is normal.      Breath sounds: Wheezing present. Abdominal:      General: Bowel sounds are normal.      Palpations: Abdomen is soft. Tenderness: There is no abdominal tenderness. There is no right CVA tenderness or left CVA tenderness. Musculoskeletal: Normal range of motion. Comments: No extremity edema, posterior calf or thigh tenderness, palpable cord, discoloration. Negative homans. Skin:     General: Skin is warm and dry. Capillary Refill: Capillary refill takes less than 2 seconds. Coloration: Skin is not jaundiced or pale. Findings: No bruising, erythema, lesion or rash. Neurological:      General: No focal deficit present. Mental Status: She is alert and oriented to person, place, and time.    Psychiatric:         Mood and Affect: Mood normal.         Behavior: Behavior normal.         DIAGNOSTIC RESULTS   LABS:    Labs Reviewed   CBC WITH AUTO DIFFERENTIAL - Abnormal; Notable for the following components:       Result Value    WBC 13.3 (*)     MPV 10.6 (*)     Neutrophils Absolute 10.4 (*)     All other components within normal limits    Narrative:     Performed at:  [facility] [address], [place], [address]   Phone [phone]   COMPREHENSIVE METABOLIC PANEL - Abnormal; Notable for the following components:    Glucose 164 (*)     AST 9 (*)     All other components within normal limits    Narrative:     Performed at:  [facility] [address] [facility],  [place], [address]   Phone [phone]   TROPONIN    Narrative:     Performed at:  [facility] [address] [facility] [address] [employer]   Phone [phone]   BRAIN NATRIURETIC PEPTIDE    Narrative:     Performed at:  [facility] [address] [facility],  [place], [address]   Phone [phone]       All other labs were within normal range or not returned as of this dictation. EKG: All EKG's are interpreted by the Emergency Department Physician in the absence of a cardiologist.  Please see their note for interpretation of EKG. RADIOLOGY:   Non-plain film images such as CT, Ultrasound and MRI are read by the radiologist. Plain radiographic images are visualized and preliminarily interpreted by the ED Provider with the below findings:        Interpretation per the Radiologist below, if available at the time of this note:    XR CHEST PORTABLE   Final Result   Unchanged diffuse bilateral pulmonary disease over the past 3 days, most   likely infectious or inflammatory, less likely metastatic disease. Overall   stable appearance of the chest.           Cta Chest W Wo Contrast    Result Date: 1/30/2021  EXAMINATION: CTA OF THE CHEST WITH AND WITHOUT CONTRAST 1/29/2021 5:08 pm TECHNIQUE: CTA of the chest was performed before and after the administration of intravenous contrast.  Multiplanar reformatted images are provided for review. MIP images are provided for review. Dose modulation, iterative reconstruction, and/or weight based adjustment of the mA/kV was utilized to reduce the radiation dose to as low as reasonably achievable.  COMPARISON: Chest CT June 26, 2020 HISTORY: ORDERING SYSTEM PROVIDED HISTORY: Thoracic aortic aneurysm without rupture [facility]) TECHNOLOGIST PROVIDED HISTORY: Reason for Exam: Thoracic aortic aneurysm without rupture Acuity: Unknown Type of Exam: Unknown FINDINGS: Aorta: No evidence of thoracic aortic aneurysm or dissection. No acute abnormality of the aorta. Mediastinum: Interval dilation of the descending aorta up to 5.0 cm is unchanged. This is measured on coronal image 86 series 800 compared to coronal image 103 series 601 of the June 2020 examination. Diffuse ectasia and peripheral plaque versus thrombus again noted, no definitive change. No dissection. No pericardial effusion. There is a patent stent in the proximal left subclavian artery again noted. Unchanged 1.4 cm left thyroid nodule. Lungs/Pleura: Extensive small ground-glass opacities and subcentimeter ill-defined nodules are present throughout both lungs, new. This is upper lung predominant. No pneumothorax. Advanced emphysema again noted. No pleural effusion no focal dense consolidation Upper Abdomen: Negative. Soft Tissues/Bones: No acute bone or soft tissue abnormality. Unchanged thoracic aneurysm since June 2020. New extensive bilateral pulmonary disease with new ground-glass opacities and new subcentimeter nodules.   These are most likely infectious or inflammatory in etiology, follow-up chest CT in 3 months recommended to ensure resolution however Advanced emphysema     Vl Dup Carotid Bilateral    Result Date: 2/1/2021  Carotid Duplex Study  Demographics   Patient Name       Kadi VILLARREAL   Date of Study      01/29/2021         Gender              Female   Patient Number     [id number]         Date of Birth       1945   Visit Number       [id number]          Age                 77 year(s)   Accession Number   [id number]         Room Number   Corporate ID       [id number]            Sonographer         Bogdan Segundo,                                                            [facility] [address] [facility] [address] Ordering Physician Demetrio Noland., Interpreting        [facility]                     CNP                Physician           Marcelino Pope MD,                                                            [address] , [address]  Procedure Type of Study:   Cerebral:Carotid, VL CAROTID DUPLEX BILATERAL. Vascular Sonographer Report  Additional Indications:SEYMOUR Impressions Right Impression The right internal carotid artery appears to have a <50% diameter reducing stenosis based on velocity criteria. The right vertebral artery demonstrates normal antegrade flow. The right subclavian artery is visualized and demonstrates turbulent flow. Left Impression The left internal carotid artery appears to have a 50-69% diameter reducing stenosis based on velocity criteria, however cannot rule out more significant stenosis due to calcific shadowing. The left distal common carotid artery demonstrates mild focal plaque formation. The left vertebral artery demonstrates abnormal pre-occlusive flow. The left subclavian artery is visualized and demonstrates turbulent flow. Conclusions   Summary   There is <50% stenosis of the right internal carotid arteries. There is 50-69% stenosis of the left internal carotid arteries. The left vertebral artery demonstrates abnormal pre-occlusive flow. The left subclavian artery is visualized and demonstrates turbulent flow. Signature   ------------------------------------------------------------------  Electronically signed by Marcelino Pope MD, [address] , [address]  (Interpreting physician) on 02/01/2021 at 02:15 PM  ------------------------------------------------------------------  Blood Pressure:Right arm 150/ mmHg. Left arm 146/ mmHg. Patient Status:Routine. [address] [place] [address] - Vascular Lab. Technical Quality:Adequate visualization. Plaque   - A plaque was found in the Right ICA. Irregular. The plaque characteristics are: heterogeneous texture. - A plaque was found in the Left ICA. Irregular. The plaque characteristics are: heterogeneous texture. There is evidence of calcified plaque. Velocities are measured in cm/s ; Diameters are measured in mm Carotid Right Measurements +---------------+----+----+-----+----+ ! Location       ! PSV ! EDV ! Angle! RI  ! +---------------+----+----+-----+----+ ! Prox CCA       !105 !14. 8!54   !0.86! +---------------+----+----+-----+----+ ! Mid CCA        !71  !15. 5!50   !0.78! +---------------+----+----+-----+----+ ! Dist CCA       !57. 1!13. 5!60   !0.76! +---------------+----+----+-----+----+ ! Prox ICA       !116 ! 35  !60   !0.7 ! +---------------+----+----+-----+----+ ! Mid ICA        !108 !19. 5!50   !0.82! +---------------+----+----+-----+----+ ! Dist ICA       !86. 3!22. 3!78   !0.74! +---------------+----+----+-----+----+ ! Prox ECA       !104 !    !61   !    ! +---------------+----+----+-----+----+ ! Vertebral      !48. 7!    !60   !    ! +---------------+----+----+-----+----+ ! Prox Subclavian! 258 !    !60   !    ! +---------------+----+----+-----+----+   - There is antegrade vertebral flow noted on the right side. - Additional Measurements:ICAPSV/CCAPSV 1.63. ICAEDV/CCAEDV 2.36. Carotid Left Measurements +---------------+----+----+-----+----+ ! Location       ! PSV ! EDV ! Angle! RI  ! +---------------+----+----+-----+----+ ! Prox CCA       !66. 3!14. 5!60   !0.78! +---------------+----+----+-----+----+ ! Mid CCA        !64.8!11. 6!60   !0.82! +---------------+----+----+-----+----+ ! Dist CCA       !66. 7!95  !60   !0.83! +---------------+----+----+-----+----+ ! Prox ICA       !111 !39. 9!60   !0.64! +---------------+----+----+-----+----+ ! Mid ICA        !128 !24. 3!60   !0.81! +---------------+----+----+-----+----+ ! Dist ICA       !83.5!21. 7!54   !0.74! +---------------+----+----+-----+----+ ! Prox ECA       !102 !    !61   !    ! +---------------+----+----+-----+----+ ! Vertebral      !51.7!    !60   !    ! +---------------+----+----+-----+----+ ! Prox Subclavian! 177 !    !61   !    ! 02/01/2021 07:47:34 PM      PATIENT REFERREDTO:  Jas Serrano MD  [facility], [address] [place] [address] [phone]    In 3 days      [facility] Emergency Department  [address] [facility]  [phone]    If symptoms worsen    follow up with your vascular surgeon and pulmonologist            DISCHARGE MEDICATIONS:  Discharge Medication List as of 2/1/2021  7:41 PM      START taking these medications    Details   azithromycin (ZITHROMAX) 250 MG tablet Take 2 tablets (500 mg) on Day 1, followed by 1 tablet (250 mg) once daily on Days 2 through 5., Disp-1 packet, R-0Print      predniSONE (DELTASONE) 10 MG tablet Take 4 tablets by mouth daily for 5 doses, Disp-20 tablet, R-0Print             DISCONTINUED MEDICATIONS:  Discharge Medication List as of 2/1/2021  7:41 PM                 (Please note that portions of this note were completed with a voice recognition program.  Efforts were made to edit the dictations but occasionally words are mis-transcribed.)    Santino Malik PA-C (electronically signed)            Santino Malik PA-C  02/01/21 2002

## 2021-02-02 NOTE — TELEPHONE ENCOUNTER
Discussed results of carotid duplex and CTA chest with patient. Carotid duplex showing less than 50% stenosis of the right ICA and 50-69% of the left ICA. Plan to repeat carotid duplex in 6 months. CTA chest showing unchanged descending thoracic aneurysm measuring 5 cm. Plan to repeat CTA chest in 6 months. Patient was seen in the ED yesterday for cough, congestion and SOB with recent findings of ground-glass opacities on her CT scan. She was swabbed for COVID and given abx and prednisone.     Electronically signed by SUSIE Gonzalez CNP on 2/2/2021 at 12:27 PM

## 2021-04-05 NOTE — ED TRIAGE NOTES
Pt arrived via dtr d/t right side pain that radiates to the back from a fall that occurred on Saturday.

## 2021-04-05 NOTE — ED PROVIDER NOTES
Constitutional: Negative for activity change, appetite change, chills, diaphoresis, fatigue and fever. Eyes: Negative for photophobia and visual disturbance. Respiratory: Negative. Negative for cough, chest tightness and shortness of breath. Cardiovascular: Positive for chest pain. Negative for palpitations and leg swelling. Gastrointestinal: Positive for abdominal pain. Negative for constipation, diarrhea, nausea and vomiting. Genitourinary: Negative for decreased urine volume, difficulty urinating, dysuria, flank pain, frequency, hematuria and urgency. Musculoskeletal: Positive for arthralgias, back pain, gait problem and myalgias. Negative for joint swelling, neck pain and neck stiffness. Skin: Negative for color change, pallor, rash and wound. Neurological: Positive for headaches. Negative for dizziness, tremors, seizures, syncope, facial asymmetry, speech difficulty, weakness, light-headedness and numbness. Positives and Pertinent negatives as per HPI. Except as noted above in the ROS, all other systems were reviewed and negative.        PAST MEDICAL HISTORY     Past Medical History:   Diagnosis Date    Aortic aneurysm [facility])     monitored by Dr. Valle Frames Buerger's disease [facility])     Cancer of vulva ([facility] 2008    microscopic invasive squamous carcinoma vulva    Cataract     COPD (chronic obstructive pulmonary disease) ([facility]     Emphysema (subcutaneous) (surgical) resulting from a procedure     History of radiation therapy      Completed external beam radiation therapy 04/23/14 total 5000 cGy to the right breast.     Hypertension     Lung bullae ([facility] [facility]     monitored by Dr. Chari Leventhal vascular dis          SURGICAL HISTORY     Past Surgical History:   Procedure Laterality Date    BREAST LUMPECTOMY Right 2/6/14    BREAST SURGERY      tumors removed    BRONCHOSCOPY  01/25/2018    BAL    EYE SURGERY      lasik    JOINT REPLACEMENT Bilateral     right and left KNEE    KNEE ARTHROSCOPY      KNEE SURGERY  9/1/10    REMOVAL OF RETAINED ANTIBIOTIC SPACERS,LEFT KNEE DEBRIDEMENT ANSD SYNOVECTOMY WITH FROZEN SECTION. LEFT KNEE PLACEMENT OF ANTIBIOTIC SPACER LEFT KNEE    OTHER SURGICAL HISTORY  3-2010    subclavian stent left    OTHER SURGICAL HISTORY      stents bilateral femoral arteries    OTHER SURGICAL HISTORY Left 2/16/15    excision of left vulva lesion    RI THORSC DX LUNGS/PERICAR/MED/PLEURAL SPACE W/O BX Left 11/2/2018    LEFT VIDEO ASSISTED THORACOSCOPY, WEDGE RESECTION, MEDIASTINAL LYMPH NODE DISSECTION performed by Brandon Garnett MD at [facility]      stent each groin    VULVECTOMY      History of radical vulvectomy with a left inguinal lymph node dissection November 2008. CURRENTMEDICATIONS       Previous Medications    ALBUTEROL SULFATE  (90 BASE) MCG/ACT INHALER    Inhale 2 puffs into the lungs every 6 hours as needed for Wheezing    AMLODIPINE (NORVASC) 5 MG TABLET    Take 5 mg by mouth daily    BISACODYL (DULCOLAX) 5 MG EC TABLET    Take 5 mg by mouth nightly as needed for Constipation. DULOXETINE (CYMBALTA) 30 MG EXTENDED RELEASE CAPSULE    Take 30 mg by mouth daily    EXEMESTANE (AROMASIN) 25 MG CHEMO TABLET    TAKE 1 TABLET BY MOUTH ONCE A DAY FOR BREAST CANCER    FLUTICASONE (FLONASE) 50 MCG/ACT NASAL SPRAY    2 sprays by Nasal route daily. FLUTICASONE-SALMETEROL (ADVAIR HFA) 230-21 MCG/ACT INHALER    INHALE 2 PUFFS BY MOUTH TWICE DAILY    GUAIFENESIN (MUCINEX) 600 MG SR TABLET    Take 800 mg by mouth 3 times daily. HYDROCODONE-ACETAMINOPHEN (NORCO)  MG PER TABLET    Take 1 tablet by mouth every 6 hours as needed for Pain.     IBUPROFEN (ADVIL;MOTRIN) 400 MG TABLET    Take 1 tablet by mouth every 6 hours as needed for Pain (incisional)    INCRUSE ELLIPTA 62.5 MCG/INH AEPB    INHALE 1 PUFF BY MOUTH DAILY AS DIRECTED    LEVALBUTEROL (XOPENEX) 0.63 MG/3ML NEBULIZATION    TAKE 3 MLS BY MOUTH VIA NEBULIZER EVERY 4 HOURS AS NEEDED FOR WHEEZING    LEVOTHYROXINE (SYNTHROID) 50 MCG TABLET    Take 50 mcg by mouth Daily. LISINOPRIL (PRINIVIL;ZESTRIL) 10 MG TABLET    Take 20 mg by mouth daily     LORAZEPAM (ATIVAN) 1 MG TABLET    Take 3 mg by mouth every evening. OXYGEN    Inhale into the lungs At night prn    POTASSIUM CHLORIDE SA (K-DUR;KLOR-CON) 20 MEQ TABLET    Take 20 mEq by mouth daily     PREGABALIN (LYRICA) 150 MG CAPSULE    Take 150 mg by mouth 2 times daily. ROSUVASTATIN (CRESTOR) 10 MG TABLET    Take 20 mg by mouth daily. UMECLIDINIUM BROMIDE (INCRUSE ELLIPTA) 62.5 MCG/INH AEPB    Inhale 1 puff into the lungs daily    XARELTO 20 MG TABS TABLET    TK 1 T PO QAM         ALLERGIES     Codeine and Penicillins    FAMILYHISTORY       Family History   Problem Relation Age of Onset    Cancer Mother         cervical    Stroke Sister     Heart Disease Brother     Breast Cancer Maternal Aunt         x2          SOCIAL HISTORY       Social History     Tobacco Use    Smoking status: Current Every Day Smoker     Packs/day: 2.00     Years: 57.00     Pack years: 114.00     Types: Cigarettes    Smokeless tobacco: Never Used    Tobacco comment: started to smoke at 16 / smoked up to 2 ppd / now smoking 0.50 ppd cigarettes   Substance Use Topics    Alcohol use: No    Drug use: Yes     Comment: marijuana       SCREENINGS             PHYSICAL EXAM    (up to 7 for level 4, 8 or more for level 5)     ED Triage Vitals [04/05/21 1329]   BP Temp Temp Source Pulse Resp SpO2 Height Weight   (!) 141/72 97.8 °F (36.6 °C) Oral 74 20 95 % 5' 5\" (1.651 m) 148 lb (67.1 kg)       Physical Exam  Constitutional:       General: She is not in acute distress. Appearance: Normal appearance. She is well-developed. She is not ill-appearing, toxic-appearing or diaphoretic. HENT:      Head: Normocephalic and atraumatic. Comments: Atraumatic. No raccoon eyes or figueroa sign. No epistaxis. No septal hematoma.   No trismus or jaw malocclusion. No evidence of [place] fracture. Right Ear: External ear normal.      Left Ear: External ear normal.      Nose: Nose normal. No congestion or rhinorrhea. Mouth/Throat:      Mouth: Mucous membranes are moist.      Pharynx: No oropharyngeal exudate or posterior oropharyngeal erythema. Eyes:      General:         Right eye: No discharge. Left eye: No discharge. Extraocular Movements: Extraocular movements intact. Conjunctiva/sclera: Conjunctivae normal.      Pupils: Pupils are equal, round, and reactive to light. Neck:      Musculoskeletal: Normal range of motion and neck supple. No neck rigidity or muscular tenderness. Cardiovascular:      Rate and Rhythm: Normal rate and regular rhythm. Pulses: Normal pulses. Heart sounds: Normal heart sounds. No murmur. No friction rub. No gallop. Comments: 2+ radial pulses bilaterally. No pedal edema. No calf tenderness. No JVD. Pulmonary:      Effort: Pulmonary effort is normal. No respiratory distress. Breath sounds: Normal breath sounds. No stridor. No wheezing, rhonchi or rales. Chest:      Chest wall: Tenderness present. Abdominal:      General: Abdomen is flat. Bowel sounds are normal. There is no distension. Palpations: Abdomen is soft. There is no mass. Tenderness: There is abdominal tenderness in the right upper quadrant and right lower quadrant. There is no right CVA tenderness, left CVA tenderness, guarding or rebound. Negative signs include Spence's sign, Rovsing's sign and McBurney's sign. Hernia: No hernia is present. Musculoskeletal: Normal range of motion. Comments: Tenderness to palpation over the right lateral inferior rib cage. No crepitus or step-off. No skin changes. No bruising. No anterior chest wall tenderness. Potential patient over the clavicles. Does have tender palpation of the right shoulder.   Decreased range of motion strength which patient states is chronic secondary to rotator cuff injury in the past.  No tenderness over the scapula. No other tender palpation diffusely throughout the bilateral upper extremities. Full range of motion and strength at baseline. Distal neurovascular intact. No pelvis instability. No TTP to the bilateral lower extremities. Chronic decreased range of motion and strength to the bilateral lower extremities which patient states is baseline secondary to previous surgeries/injuries. Distal neurovascular intact. Gait deferred. No TTP to the midline cervical, thoracic or lumbar spine. No crepitus or step-off. Does have tender outpatient over the right cervical paraspinal musculature and right thoracic paraspinal musculature. Lymphadenopathy:      Cervical: No cervical adenopathy. Skin:     General: Skin is warm and dry. Coloration: Skin is not pale. Findings: No erythema. Neurological:      General: No focal deficit present. Mental Status: She is alert and oriented to person, place, and time. GCS: GCS eye subscore is 4. GCS verbal subscore is 5. GCS motor subscore is 6. Cranial Nerves: Cranial nerves are intact. No cranial nerve deficit, dysarthria or facial asymmetry. Sensory: Sensation is intact. No sensory deficit. Motor: Motor function is intact. Comments: Gait deferred.    Psychiatric:         Behavior: Behavior normal.         DIAGNOSTIC RESULTS   LABS:    Labs Reviewed   CBC WITH AUTO DIFFERENTIAL - Abnormal; Notable for the following components:       Result Value    WBC 12.8 (*)     Neutrophils Absolute 10.7 (*)     All other components within normal limits    Narrative:     Performed at:  [facility] [address], [place], [address]   Phone [phone]   BASIC METABOLIC PANEL - Abnormal; Notable for the following components:    Glucose 143 (*)     All other components within normal limits    Narrative: RECOMMENDATIONS:   3.3 cm abdominal aortic aneurysm. Recommend follow-up every 3 years. Reference: J Am Shabana Radiol 4362;96:232-765. CT CERVICAL SPINE WO CONTRAST   Final Result   No acute abnormality of the cervical spine. CT HEAD WO CONTRAST   Final Result   No acute traumatic intracranial abnormality. There is atrophy and   small-vessel ischemic change      Right maxillary sinus disease           No results found. PROCEDURES   Unless otherwise noted below, none     Procedures    CRITICAL CARE TIME   The total critical care time spent while evaluating and treating this patient was 35 minutes. This excludes time spent doing separately billable procedures. This includes time at the bedside, data interpretation, medication management, obtaining critical history from collateral sources if the patient is unable to provide it directly, and physician consultation. Specifics of interventions taken and potentially life-threatening diagnostic considerations are listed above in the medical decision making.         CONSULTS:  None      EMERGENCY DEPARTMENT COURSE and DIFFERENTIAL DIAGNOSIS/MDM:   Vitals:    Vitals:    04/05/21 1329   BP: (!) 141/72   Pulse: 74   Resp: 20   Temp: 97.8 °F (36.6 °C)   TempSrc: Oral   SpO2: 95%   Weight: 148 lb (67.1 kg)   Height: 5' 5\" (1.651 m)       Patient was given the following medications:  Medications   HYDROcodone-acetaminophen (NORCO) 5-325 MG per tablet 1 tablet (1 tablet Oral Given 4/5/21 1703)   nicotine (NICODERM CQ) 21 MG/24HR 1 patch (1 patch Transdermal Patch Applied 4/5/21 1837)   HYDROcodone-acetaminophen (NORCO) 5-325 MG per tablet 1 tablet (1 tablet Oral Given 4/5/21 1410)   ondansetron (ZOFRAN-ODT) disintegrating tablet 8 mg (8 mg Oral Given 4/5/21 1410)   iopamidol (ISOVUE-370) 76 % injection 75 mL (75 mLs Intravenous Given 4/5/21 1524)   cefTRIAXone (ROCEPHIN) 1000 mg in sterile water 10 mL IV syringe (1,000 mg Intravenous Given 4/5/21 1702) azithromycin (ZITHROMAX) 500 mg in D5W 250ml Vial Mate (0 mg Intravenous Stopped 4/5/21 1817)           Patient is a 78-year-old female who presents to the ED with complaint of a fall. Fall from wheelchair on Saturday evening. Patient anticoagulated on Xarelto. CBC showed white count of 12.8 with normal hemoglobin and platelets. BMP unremarkable. Coags obtained and type and screen obtained. X-ray of the right shoulder showed AC joint degenerative changes with deformity of the proximal humerus laterally suggesting impingement. CT of the chest abdomen pelvis obtained and showed nondisplaced right anterior fourth through sixth rib fractures with right basilar atelectasis versus pneumonia with small parapneumonic effusion. Incidental 3.3 cm aortic aneurysm noted. Stable 4.5 cm thoracic aortic aneurysm noted. No other acute abnormality noted. CT of the head and cervical spine unremarkable. Given history and physical examination suffering from fall with multiple rib fractures. Given the concern for potential pneumonia with parapneumonic effusion was started on antibiotics here in the ED. Patient has no fever or hypoxia. No cough. Could be atelectasis secondary to recent rib fracture. Do not believe represents hemothorax. Given history and physical examination suffering from fall with multiple rib fractures. Given patient's presentation will require transfer to the [place] for further evaluation for trauma. Case discussed with attending at the [facility], Dr. Ángel Garcia, who graciously agreed to accept patient for transfer at this time. Patient transferred in stable condition. FINAL IMPRESSION      1. Fall from wheelchair, initial encounter    2. Closed fracture of multiple ribs of right side, initial encounter          DISPOSITION/PLAN   DISPOSITION Decision To Transfer 04/05/2021 04:27:02 PM      PATIENT REFERREDTO:  No follow-up provider specified.     DISCHARGE MEDICATIONS:  New

## 2021-04-05 NOTE — ED NOTES
Bed: 15  Expected date:   Expected time:   Means of arrival:   Comments:  [address] [address] [place], RN  04/05/21 7034

## 2021-04-05 NOTE — ED NOTES
Pt comes in today by EMS due to a fall out of her wheelchair that occurred on Saturday night. C/o pain right side into right upper back. Pt states she did hit her head when she fell forward. Denies any LOC. Denies any other complaints. Pt is alert and oriented. Tenderness to ribcage. No bruising. Pt x1 assist to bedpan. Other vitals stable. 2L NC  Applied for comfort and low O2 of 88%. Pt saturating fine at 96%. Will continue to monitor.      Michelle Najera RN  04/05/21 1924

## 2021-04-19 NOTE — ED PROVIDER NOTES
I independently performed a history and physical on Parminder Nunez. All diagnostic, treatment, and disposition decisions were made by myself in conjunction with the advanced practice provider. Briefly, this is a 77 y.o. female here for fall from her wheelchair. She fell Saturday night after rolling forward. Landed on her R anterior lateral rib wall. No head trauma. Has shoulder pain as well worse with movement. Is on xarelto. On exam pt is resting comfortably  Head atraumatic  No C spine tenderness  Cardiac RRR, no murmur, +area of tenderness across R lateral rib wall  Lungs clear bilaterally, no increased work of breathing  Abdomen soft nontender  +R anterior shoulder tenderness no deformity  Neuro no drift in extremities     XR SHOULDER RIGHT (MIN 2 VIEWS)   Final Result   No acute abnormality. AC joint degenerative changes with sclerosis and deformity of the proximal   humerus laterally suggesting impingement      Interstitial prominence in the visualized portion of the right lung may be   related to interstitial lung disease         CT CHEST ABDOMEN PELVIS W CONTRAST   Final Result   1. Nondisplaced right anterior 4th through 6th rib fractures. 2. Right basilar segmental atelectasis versus pneumonia with small   parapneumonic effusion. 3. No acute abdominopelvic abnormality. 4. Small 3.3 cm mid abdominal aortic aneurysm. 5. Stable 4.5 cm descending thoracic aortic aneurysm. RECOMMENDATIONS:   3.3 cm abdominal aortic aneurysm. Recommend follow-up every 3 years. Reference: J Am Shabana Radiol 9115;94:259-112. CT CERVICAL SPINE WO CONTRAST   Final Result   No acute abnormality of the cervical spine. CT HEAD WO CONTRAST   Final Result   No acute traumatic intracranial abnormality.   There is atrophy and   small-vessel ischemic change      Right maxillary sinus disease           Labs Reviewed   CBC WITH AUTO DIFFERENTIAL - Abnormal; Notable for the following components: Result Value    WBC 12.8 (*)     Neutrophils Absolute 10.7 (*)     All other components within normal limits    Narrative:     Performed at:  [facility]  [address], [place]   Phone [phone]   BASIC METABOLIC PANEL - Abnormal; Notable for the following components:    Glucose 143 (*)     All other components within normal limits    Narrative:     Performed at:  [facility]  [address], [place]   Phone [phone] - Abnormal; Notable for the following components:    Protime 14.3 (*)     INR 1.23 (*)     All other components within normal limits    Narrative:     Performed at:  [facility] [address], [place]   Phone [phone]   APTT    Narrative:     Performed at:  [facility] [address], [place]   Phone [phone]   TYPE AND SCREEN    Narrative:     Performed at:  [facility] [address], [place]   Phone [phone]     Medications   HYDROcodone-acetaminophen [facility]) 5-325 MG per tablet 1 tablet (1 tablet Oral Given 4/5/21 1410)   ondansetron (ZOFRAN-ODT) disintegrating tablet 8 mg (8 mg Oral Given 4/5/21 1410)   iopamidol (ISOVUE-370) 76 % injection 75 mL (75 mLs Intravenous Given 4/5/21 1524)   cefTRIAXone (ROCEPHIN) 1000 mg in sterile water 10 mL IV syringe (1,000 mg Intravenous Given 4/5/21 1702)   azithromycin (ZITHROMAX) 500 mg in D5W 250ml Vial Mate (0 mg Intravenous Stopped 4/5/21 1817)         Pt is 76 yo F on AC p/w mechanical fall from wheelchair. Pt appears comfortable upon presentation with vitals reassuring. Her trauma exam as above warrants above imaging showing 3 consecutive rib fractures an resultant parapneumonic effusion concerning for PNA vs bleed.  She does have leukocytosis today as well and started on broad spectrum abx. She does warrant trauma transfer to  for above and consents to this. The total Critical Care time is 35 minutes which excludes separately billable procedures. Patient Referrals:  No follow-up provider specified. Discharge Medications:  Discharge Medication List as of 4/5/2021  9:12 PM          FINAL IMPRESSION  1. Fall from wheelchair, initial encounter    2. Closed fracture of multiple ribs of right side, initial encounter        Blood pressure 134/73, pulse 71, temperature 97.8 °F (36.6 °C), temperature source Oral, resp. rate 20, height 5' 5\" (1.651 m), weight 148 lb (67.1 kg), last menstrual period 03/11/1991, SpO2 94 %, not currently breastfeeding.      For further details of [facility] emergency department encounter, please see documentation by advanced practice provider        Giovanna Gonzalez MD  04/19/21 5917

## 2021-09-28 NOTE — PROGRESS NOTES
[facility])   Vascular Surgery Followup    Referring Provider:  Eulalia Persaud MD     No chief complaint on file. History of Present Illness:  80-year-old female here today for follow-up of carotid atherosclerosis, peripheral vascular disease, thoracic aortic aneurysm. She underwent studies of all of these areas and is here today to discuss the results. She denies TIA or stroke. She states since a neck surgery 2 years ago she is nonambulatory. She is only able to stand for a minute or so at a time. Denies pain in her legs. Denies TIA or stroke. Denies chest pain. Past Medical History:   has a past medical history of Aortic aneurysm ([address] Arthritis, Buerger's disease ([address] Cancer of vulva ([address] Cataract, COPD (chronic obstructive pulmonary disease) ([address] Emphysema (subcutaneous) (surgical) resulting from a procedure, History of radiation therapy, Hypertension, Lung bullae ([address] and Periph vascular dis. Surgical History:   has a past surgical history that includes Breast surgery; Knee arthroscopy; Vulvectomy (); other surgical history (3-2010); eye surgery; knee surgery (9/1/10); vascular surgery; Breast lumpectomy (Right, 2/6/14); other surgical history; other surgical history (Left, 2/16/15); joint replacement (Bilateral); bronchoscopy (01/25/2018); and pr thorsc dx lungs/pericar/med/pleural space w/o bx (Left, 11/2/2018). Social History:   reports that she has been smoking cigarettes. She has a 114.00 pack-year smoking history. She has never used smokeless tobacco. She reports current drug use. She reports that she does not drink alcohol. Family History:  family history includes Breast Cancer in her maternal aunt; Cancer in her mother; Heart Disease in her brother; Stroke in her sister.      Home Medications:  Current Outpatient Medications   Medication Sig Dispense Refill    albuterol sulfate  (90 Base) MCG/ACT inhaler Inhale 2 puffs into the lungs every 6 hours as been no unanticipated weight loss. There's been no change in energy level, sleep pattern, or activity level. · Eyes: No visual changes or diplopia. No scleral icterus. · ENT: No Headaches, hearing loss or vertigo. No mouth sores or sore throat. · Cardiovascular: Reviewed in HPI  · Respiratory: No cough or wheezing, no sputum production. No hematemesis. · Gastrointestinal: No abdominal pain, appetite loss, blood in stools. No change in bowel or bladder habits. · Genitourinary: No dysuria, trouble voiding, or hematuria. · Musculoskeletal:  No gait disturbance, weakness or joint complaints. · Integumentary: No rash or pruritis. · Neurological: No headache, diplopia, change in muscle strength, numbness or tingling. No change in gait, balance, coordination, mood, affect, memory, mentation, behavior. · Psychiatric: No anxiety, no depression. · Endocrine: No malaise, fatigue or temperature intolerance. No excessive thirst, fluid intake, or urination. No tremor. · Hematologic/Lymphatic: No abnormal bruising or bleeding, blood clots or swollen lymph nodes. · Allergic/Immunologic: No nasal congestion or hives. Physical Examination:    There were no vitals filed for this visit. General appearance: alert, appears stated age, cooperative and no distress  Head: Normocephalic, without obvious abnormality, atraumatic  Neck: no adenopathy, no carotid bruit, no JVD, supple, symmetrical, trachea midline and thyroid: not enlarged, symmetric, no tenderness/mass/nodules  Lungs: clear to auscultation bilaterally  Heart: regular rate and rhythm, S1, S2 normal, no murmur, click, rub or gallop  Abdomen: soft, non-tender.  Bowel sounds normal. No masses,  no organomegaly  Extremities: extremities normal, atraumatic, no cyanosis or edema    Pulses:   L brachial 2 R brachial 2   L radial 2 R radial 2   L femoral 2 R femoral 2   L popliteal 2 R popliteal 2   L posterior tibial + R posterior tibial +   L dorsalis pedis + R dorsalis pedis +   Doppler Signals:  +    Neurologic: Grossly normal    MEDICAL DECISION MAKING/TESTING  I have reviewed the testing personally and my interpretation is below. Right Impression   The right internal carotid artery appears to have a <50% diameter reducing   stenosis based on velocity criteria. The right vertebral artery demonstrates normal antegrade flow. The right subclavian artery is visualized and demonstrates multiphasic flow. Left Impression   The left internal carotid artery appears to have a 50-69% diameter reducing   stenosis based on velocity criteria. The left vertebral artery demonstrates normal antegrade flow. The left subclavian artery is visualized and demonstrates multiphasic flow.            Right Impression   Right MAURA: 0.76 . This is consistent with mild to moderate arterial   insufficiency at rest.   Imaging reveals moderate focal plaque in the common femoral artery. Imaging of the right lower extremity reveals normal multiphasic flow with no   evidence of hemodynamically significant stenosis. Left Impression   Left MAURA: 0.92. This is consistent with mild arterial insufficiency at rest.   Imaging of the left lower extremity reveals normal multiphasic flow with no   evidence of hemodynamically significant stenosis. 1. Stable aneurysm of the descending thoracic aorta. 2. Advanced centrilobular emphysema. 3. Nodular ground-glass opacities from prior CT have progressed to become   more of a diffuse ground-glass pattern in the upper lung zones.  This   indicates chronic progressive change that may represent hypersensitivity   pneumonitis or chronic eosinophilic pneumonia.  Other infectious or   inflammatory etiologies may be considered. 4. Multinodular thyroid.  Stable appearance from prior imaging.  Correlate   with prior history and workup.          Assessment:     Patient Active Problem List   Diagnosis    Breast mass, right    Breast [facility]    Osteoporosis    [facility]    Diabetes ([facility]    Dysphagia, oropharyngeal    Peripheral blood vessel disorder (HCC)    Procidentia of rectum    Gonalgia    Cervical spinal stenosis    HTN (hypertension), benign    Pulmonary emphysema (HCC)    History of cancer of vulva    Smoking    History of right breast cancer    Malignant neoplasm of lower-outer quadrant of right female breast ([facility]    History of vulvar dysplasia    Encounter for follow-up surveillance of breast cancer    Depression    Pap smear of cervix shows high risk HPV present    Chronic respiratory failure with hypoxia (HCC)    Abnormal CT of the chest    Respiratory failure (HCC)    Acute respiratory failure (HCC)    Moderate COPD (chronic obstructive pulmonary disease) (HCC)    Chest pain    Squamous cell carcinoma of left lung (HCC)    Primary cancer of left upper lobe of lung (HCC)    Generalized weakness    Numbness and tingling    Cervical disc disease with myelopathy    Dyslipidemia       Plan:  1. Carotid atherosclerosis, bilateral  Stable asymptomatic carotid atherosclerosis. Continue routine surveillance imaging with repeat carotid duplex scan in 1 year  - VL DUP CAROTID BILATERAL; Future    2. Thoracic aortic aneurysm without rupture (HCC)  Unchanged 5.1 cm descending thoracic aortic aneurysm. CT scan findings reviewed with patient. Plan for repeat imaging in 1 year  - CTA CHEST ABDOMEN PELVIS W CONTRAST; Future    3. PVD -stable and asymptomatic at this time. No need for further follow-up imaging unless she develops new symptoms    Thank you for allowing me to participate in the care of this individual.  Please do not hesitate to contact me with any questions. Jenifer Genao M.D., FACS.   9/28/2021  2:41 PM

## 2022-01-01 ENCOUNTER — APPOINTMENT (OUTPATIENT)
Dept: GENERAL RADIOLOGY | Age: 77
DRG: 445 | End: 2022-01-01
Payer: COMMERCIAL

## 2022-01-01 ENCOUNTER — APPOINTMENT (OUTPATIENT)
Dept: CT IMAGING | Age: 77
DRG: 445 | End: 2022-01-01
Payer: COMMERCIAL

## 2022-01-01 ENCOUNTER — HOSPITAL ENCOUNTER (INPATIENT)
Age: 77
LOS: 4 days | Discharge: HOSPICE | DRG: 445 | End: 2022-01-27
Attending: INTERNAL MEDICINE | Admitting: INTERNAL MEDICINE
Payer: COMMERCIAL

## 2022-01-01 ENCOUNTER — ANESTHESIA EVENT (OUTPATIENT)
Dept: ENDOSCOPY | Age: 77
DRG: 445 | End: 2022-01-01
Payer: COMMERCIAL

## 2022-01-01 ENCOUNTER — ANESTHESIA (OUTPATIENT)
Dept: ENDOSCOPY | Age: 77
DRG: 445 | End: 2022-01-01
Payer: COMMERCIAL

## 2022-01-01 ENCOUNTER — HOSPITAL ENCOUNTER (EMERGENCY)
Age: 77
End: 2022-01-30
Attending: STUDENT IN AN ORGANIZED HEALTH CARE EDUCATION/TRAINING PROGRAM
Payer: COMMERCIAL

## 2022-01-01 VITALS
OXYGEN SATURATION: 96 % | DIASTOLIC BLOOD PRESSURE: 55 MMHG | TEMPERATURE: 97.2 F | SYSTOLIC BLOOD PRESSURE: 116 MMHG | RESPIRATION RATE: 13 BRPM

## 2022-01-01 VITALS
RESPIRATION RATE: 18 BRPM | OXYGEN SATURATION: 92 % | BODY MASS INDEX: 29.29 KG/M2 | SYSTOLIC BLOOD PRESSURE: 160 MMHG | DIASTOLIC BLOOD PRESSURE: 82 MMHG | HEIGHT: 65 IN | WEIGHT: 175.8 LBS | HEART RATE: 86 BPM | TEMPERATURE: 98.3 F

## 2022-01-01 DIAGNOSIS — E80.6 HYPERBILIRUBINEMIA: ICD-10-CM

## 2022-01-01 DIAGNOSIS — Z51.5 HOSPICE CARE: ICD-10-CM

## 2022-01-01 DIAGNOSIS — R74.01 TRANSAMINITIS: ICD-10-CM

## 2022-01-01 DIAGNOSIS — R17 JAUNDICE, NON-NEONATAL: Primary | ICD-10-CM

## 2022-01-01 DIAGNOSIS — I46.9 CARDIAC ARREST (HCC): Primary | ICD-10-CM

## 2022-01-01 DIAGNOSIS — K86.89 PANCREATIC MASS: ICD-10-CM

## 2022-01-01 LAB
A/G RATIO: 1.3 (ref 1.1–2.2)
ACETAMINOPHEN LEVEL: 6 UG/ML (ref 10–30)
AFP: 1.6 UG/L
ALBUMIN SERPL-MCNC: 2.5 G/DL (ref 3.4–5)
ALBUMIN SERPL-MCNC: 2.6 G/DL (ref 3.4–5)
ALBUMIN SERPL-MCNC: 2.7 G/DL (ref 3.4–5)
ALBUMIN SERPL-MCNC: 2.7 G/DL (ref 3.4–5)
ALBUMIN SERPL-MCNC: 2.8 G/DL (ref 3.4–5)
ALBUMIN SERPL-MCNC: 2.8 G/DL (ref 3.4–5)
ALP BLD-CCNC: 1123 U/L (ref 40–129)
ALP BLD-CCNC: 1657 U/L (ref 40–129)
ALP BLD-CCNC: 1746 U/L (ref 40–129)
ALP BLD-CCNC: 1780 U/L (ref 40–129)
ALP BLD-CCNC: 903 U/L (ref 40–129)
ALT SERPL-CCNC: 248 U/L (ref 10–40)
ALT SERPL-CCNC: 359 U/L (ref 10–40)
ALT SERPL-CCNC: 459 U/L (ref 10–40)
ALT SERPL-CCNC: 522 U/L (ref 10–40)
ALT SERPL-CCNC: 563 U/L (ref 10–40)
AMMONIA: 28 UMOL/L (ref 11–51)
ANION GAP SERPL CALCULATED.3IONS-SCNC: 12 MMOL/L (ref 3–16)
ANION GAP SERPL CALCULATED.3IONS-SCNC: 12 MMOL/L (ref 3–16)
ANION GAP SERPL CALCULATED.3IONS-SCNC: 13 MMOL/L (ref 3–16)
ANION GAP SERPL CALCULATED.3IONS-SCNC: 15 MMOL/L (ref 3–16)
ANION GAP SERPL CALCULATED.3IONS-SCNC: 15 MMOL/L (ref 3–16)
ANION GAP SERPL CALCULATED.3IONS-SCNC: 21 MMOL/L (ref 3–16)
ANISOCYTOSIS: ABNORMAL
APTT: 33.1 SEC (ref 26.2–38.6)
APTT: 35.5 SEC (ref 26.2–38.6)
AST SERPL-CCNC: 135 U/L (ref 15–37)
AST SERPL-CCNC: 249 U/L (ref 15–37)
AST SERPL-CCNC: 542 U/L (ref 15–37)
AST SERPL-CCNC: 619 U/L (ref 15–37)
AST SERPL-CCNC: 784 U/L (ref 15–37)
BACTERIA: ABNORMAL /HPF
BANDED NEUTROPHILS RELATIVE PERCENT: 2 % (ref 0–7)
BASE EXCESS VENOUS: -4 MMOL/L (ref -3–3)
BASOPHILS ABSOLUTE: 0 K/UL (ref 0–0.2)
BASOPHILS RELATIVE PERCENT: 0 %
BILIRUB SERPL-MCNC: 10.3 MG/DL (ref 0–1)
BILIRUB SERPL-MCNC: 13.8 MG/DL (ref 0–1)
BILIRUB SERPL-MCNC: 14 MG/DL (ref 0–1)
BILIRUB SERPL-MCNC: 3.2 MG/DL (ref 0–1)
BILIRUB SERPL-MCNC: 4.4 MG/DL (ref 0–1)
BILIRUBIN DIRECT: 2.2 MG/DL (ref 0–0.3)
BILIRUBIN DIRECT: 3.1 MG/DL (ref 0–0.3)
BILIRUBIN DIRECT: 7.7 MG/DL (ref 0–0.3)
BILIRUBIN DIRECT: 9.6 MG/DL (ref 0–0.3)
BILIRUBIN DIRECT: >10 MG/DL (ref 0–0.3)
BILIRUBIN URINE: ABNORMAL
BILIRUBIN, INDIRECT: 1 MG/DL (ref 0–1)
BILIRUBIN, INDIRECT: 1.3 MG/DL (ref 0–1)
BILIRUBIN, INDIRECT: 2.6 MG/DL (ref 0–1)
BILIRUBIN, INDIRECT: 4.4 MG/DL (ref 0–1)
BILIRUBIN, INDIRECT: ABNORMAL MG/DL (ref 0–1)
BLOOD CULTURE, ROUTINE: NORMAL
BLOOD, URINE: NEGATIVE
BUN BLDV-MCNC: 19 MG/DL (ref 7–20)
BUN BLDV-MCNC: 20 MG/DL (ref 7–20)
BUN BLDV-MCNC: 21 MG/DL (ref 7–20)
BUN BLDV-MCNC: 22 MG/DL (ref 7–20)
BUN BLDV-MCNC: 31 MG/DL (ref 7–20)
BUN BLDV-MCNC: 35 MG/DL (ref 7–20)
CA 19-9: 5506 U/ML (ref 0–35)
CALCIUM SERPL-MCNC: 8.3 MG/DL (ref 8.3–10.6)
CALCIUM SERPL-MCNC: 8.5 MG/DL (ref 8.3–10.6)
CALCIUM SERPL-MCNC: 8.9 MG/DL (ref 8.3–10.6)
CALCIUM SERPL-MCNC: 9 MG/DL (ref 8.3–10.6)
CALCIUM SERPL-MCNC: 9.1 MG/DL (ref 8.3–10.6)
CALCIUM SERPL-MCNC: 9.8 MG/DL (ref 8.3–10.6)
CARBOXYHEMOGLOBIN: 5.9 % (ref 0–1.5)
CEA: 23.1 NG/ML (ref 0–5)
CHLORIDE BLD-SCNC: 103 MMOL/L (ref 99–110)
CHLORIDE BLD-SCNC: 104 MMOL/L (ref 99–110)
CHLORIDE BLD-SCNC: 106 MMOL/L (ref 99–110)
CHLORIDE BLD-SCNC: 106 MMOL/L (ref 99–110)
CHLORIDE BLD-SCNC: 111 MMOL/L (ref 99–110)
CHLORIDE BLD-SCNC: 97 MMOL/L (ref 99–110)
CLARITY: ABNORMAL
CO2: 11 MMOL/L (ref 21–32)
CO2: 15 MMOL/L (ref 21–32)
CO2: 18 MMOL/L (ref 21–32)
CO2: 19 MMOL/L (ref 21–32)
CO2: 20 MMOL/L (ref 21–32)
CO2: 20 MMOL/L (ref 21–32)
COLOR: ABNORMAL
CREAT SERPL-MCNC: 0.6 MG/DL (ref 0.6–1.2)
CREAT SERPL-MCNC: 0.6 MG/DL (ref 0.6–1.2)
CREAT SERPL-MCNC: 0.8 MG/DL (ref 0.6–1.2)
CREAT SERPL-MCNC: <0.5 MG/DL (ref 0.6–1.2)
CULTURE, BLOOD 2: NORMAL
EKG ATRIAL RATE: 73 BPM
EKG DIAGNOSIS: NORMAL
EKG P AXIS: 78 DEGREES
EKG P-R INTERVAL: 192 MS
EKG Q-T INTERVAL: 372 MS
EKG QRS DURATION: 84 MS
EKG QTC CALCULATION (BAZETT): 409 MS
EKG R AXIS: -33 DEGREES
EKG T AXIS: 29 DEGREES
EKG VENTRICULAR RATE: 73 BPM
EOSINOPHILS ABSOLUTE: 0 K/UL (ref 0–0.6)
EOSINOPHILS ABSOLUTE: 0.1 K/UL (ref 0–0.6)
EOSINOPHILS ABSOLUTE: 0.2 K/UL (ref 0–0.6)
EOSINOPHILS RELATIVE PERCENT: 0 %
EOSINOPHILS RELATIVE PERCENT: 1 %
EOSINOPHILS RELATIVE PERCENT: 1 %
EPITHELIAL CELLS, UA: 7 /HPF (ref 0–5)
FERRITIN: 219.2 NG/ML (ref 15–150)
FOLATE: 10.75 NG/ML (ref 4.78–24.2)
GFR AFRICAN AMERICAN: >60
GFR NON-AFRICAN AMERICAN: >60
GLUCOSE BLD-MCNC: 120 MG/DL (ref 70–99)
GLUCOSE BLD-MCNC: 121 MG/DL (ref 70–99)
GLUCOSE BLD-MCNC: 135 MG/DL (ref 70–99)
GLUCOSE BLD-MCNC: 142 MG/DL (ref 70–99)
GLUCOSE BLD-MCNC: 152 MG/DL (ref 70–99)
GLUCOSE BLD-MCNC: 175 MG/DL (ref 70–99)
GLUCOSE BLD-MCNC: 185 MG/DL (ref 70–99)
GLUCOSE BLD-MCNC: 192 MG/DL (ref 70–99)
GLUCOSE BLD-MCNC: 95 MG/DL (ref 70–99)
GLUCOSE BLD-MCNC: 96 MG/DL (ref 70–99)
GLUCOSE URINE: NEGATIVE MG/DL
HCO3 VENOUS: 21.4 MMOL/L (ref 23–29)
HCT VFR BLD CALC: 26.7 % (ref 36–48)
HCT VFR BLD CALC: 30.5 % (ref 36–48)
HCT VFR BLD CALC: 31.8 % (ref 36–48)
HCT VFR BLD CALC: 34 % (ref 36–48)
HCT VFR BLD CALC: 36.1 % (ref 36–48)
HEMATOLOGY PATH CONSULT: NO
HEMATOLOGY PATH CONSULT: NO
HEMATOLOGY PATH CONSULT: NORMAL
HEMATOLOGY PATH CONSULT: YES
HEMOGLOBIN, VEN, REDUCED: 3 %
HEMOGLOBIN: 10.1 G/DL (ref 12–16)
HEMOGLOBIN: 11.1 G/DL (ref 12–16)
HEMOGLOBIN: 8.5 G/DL (ref 12–16)
HEMOGLOBIN: 9.7 G/DL (ref 12–16)
HEMOGLOBIN: 9.7 G/DL (ref 12–16)
HYPOCHROMIA: ABNORMAL
INR BLD: 1.36 (ref 0.88–1.12)
INR BLD: 1.42 (ref 0.88–1.12)
IRON SATURATION: 28 % (ref 15–50)
IRON: 49 UG/DL (ref 37–145)
KETONES, URINE: ABNORMAL MG/DL
LACTIC ACID, SEPSIS: 1.9 MMOL/L (ref 0.4–1.9)
LACTIC ACID: 0.7 MMOL/L (ref 0.4–2)
LEUKOCYTE ESTERASE, URINE: ABNORMAL
LIPASE: 18 U/L (ref 13–60)
LIPASE: 62 U/L (ref 13–60)
LYMPHOCYTES ABSOLUTE: 0.3 K/UL (ref 1–5.1)
LYMPHOCYTES ABSOLUTE: 0.4 K/UL (ref 1–5.1)
LYMPHOCYTES ABSOLUTE: 2.5 K/UL (ref 1–5.1)
LYMPHOCYTES ABSOLUTE: 2.7 K/UL (ref 1–5.1)
LYMPHOCYTES ABSOLUTE: 5.2 K/UL (ref 1–5.1)
LYMPHOCYTES RELATIVE PERCENT: 13 %
LYMPHOCYTES RELATIVE PERCENT: 15 %
LYMPHOCYTES RELATIVE PERCENT: 2 %
LYMPHOCYTES RELATIVE PERCENT: 25 %
LYMPHOCYTES RELATIVE PERCENT: 3 %
MCH RBC QN AUTO: 30.1 PG (ref 26–34)
MCH RBC QN AUTO: 30.2 PG (ref 26–34)
MCH RBC QN AUTO: 30.5 PG (ref 26–34)
MCH RBC QN AUTO: 30.6 PG (ref 26–34)
MCH RBC QN AUTO: 30.6 PG (ref 26–34)
MCHC RBC AUTO-ENTMCNC: 28.5 G/DL (ref 31–36)
MCHC RBC AUTO-ENTMCNC: 30.6 G/DL (ref 31–36)
MCHC RBC AUTO-ENTMCNC: 31.8 G/DL (ref 31–36)
MCHC RBC AUTO-ENTMCNC: 32 G/DL (ref 31–36)
MCHC RBC AUTO-ENTMCNC: 32 G/DL (ref 31–36)
MCV RBC AUTO: 107.4 FL (ref 80–100)
MCV RBC AUTO: 94.5 FL (ref 80–100)
MCV RBC AUTO: 94.7 FL (ref 80–100)
MCV RBC AUTO: 95.6 FL (ref 80–100)
MCV RBC AUTO: 99.5 FL (ref 80–100)
METAMYELOCYTES RELATIVE PERCENT: 4 %
METHEMOGLOBIN VENOUS: <0 %
MICROSCOPIC EXAMINATION: YES
MONOCYTES ABSOLUTE: 0 K/UL (ref 0–1.3)
MONOCYTES ABSOLUTE: 0.4 K/UL (ref 0–1.3)
MONOCYTES ABSOLUTE: 1.2 K/UL (ref 0–1.3)
MONOCYTES ABSOLUTE: 1.4 K/UL (ref 0–1.3)
MONOCYTES ABSOLUTE: 1.7 K/UL (ref 0–1.3)
MONOCYTES RELATIVE PERCENT: 0 %
MONOCYTES RELATIVE PERCENT: 11 %
MONOCYTES RELATIVE PERCENT: 2 %
MONOCYTES RELATIVE PERCENT: 8 %
MONOCYTES RELATIVE PERCENT: 8 %
MONONUCLEAR UNIDENTIFIED CELLS: 6 %
NEUTROPHILS ABSOLUTE: 11.1 K/UL (ref 1.7–7.7)
NEUTROPHILS ABSOLUTE: 12.6 K/UL (ref 1.7–7.7)
NEUTROPHILS ABSOLUTE: 12.9 K/UL (ref 1.7–7.7)
NEUTROPHILS ABSOLUTE: 13.9 K/UL (ref 1.7–7.7)
NEUTROPHILS ABSOLUTE: 17.5 K/UL (ref 1.7–7.7)
NEUTROPHILS RELATIVE PERCENT: 61 %
NEUTROPHILS RELATIVE PERCENT: 80 %
NEUTROPHILS RELATIVE PERCENT: 84 %
NEUTROPHILS RELATIVE PERCENT: 85 %
NEUTROPHILS RELATIVE PERCENT: 89 %
NITRITE, URINE: POSITIVE
O2 CONTENT, VEN: 14 VOL %
O2 SAT, VEN: 97 %
O2 THERAPY: ABNORMAL
PCO2, VEN: 39.2 MMHG (ref 40–50)
PDW BLD-RTO: 17.5 % (ref 12.4–15.4)
PDW BLD-RTO: 18 % (ref 12.4–15.4)
PDW BLD-RTO: 18.5 % (ref 12.4–15.4)
PDW BLD-RTO: 19.1 % (ref 12.4–15.4)
PDW BLD-RTO: 19.8 % (ref 12.4–15.4)
PERFORMED ON: ABNORMAL
PH UA: 6 (ref 5–8)
PH VENOUS: 7.34 (ref 7.35–7.45)
PHOSPHORUS: 4.3 MG/DL (ref 2.5–4.9)
PHOSPHORUS: 4.9 MG/DL (ref 2.5–4.9)
PLATELET # BLD: 185 K/UL (ref 135–450)
PLATELET # BLD: 189 K/UL (ref 135–450)
PLATELET # BLD: 197 K/UL (ref 135–450)
PLATELET # BLD: 203 K/UL (ref 135–450)
PLATELET # BLD: 209 K/UL (ref 135–450)
PLATELET SLIDE REVIEW: ADEQUATE
PMV BLD AUTO: 10.9 FL (ref 5–10.5)
PMV BLD AUTO: 11.6 FL (ref 5–10.5)
PMV BLD AUTO: 11.7 FL (ref 5–10.5)
PMV BLD AUTO: 12 FL (ref 5–10.5)
PMV BLD AUTO: 12 FL (ref 5–10.5)
PO2, VEN: 79.9 MMHG (ref 25–40)
POLYCHROMASIA: ABNORMAL
POLYCHROMASIA: ABNORMAL
POTASSIUM REFLEX MAGNESIUM: 5.5 MMOL/L (ref 3.5–5.1)
POTASSIUM SERPL-SCNC: 4.4 MMOL/L (ref 3.5–5.1)
POTASSIUM SERPL-SCNC: 4.7 MMOL/L (ref 3.5–5.1)
POTASSIUM SERPL-SCNC: 5.1 MMOL/L (ref 3.5–5.1)
POTASSIUM SERPL-SCNC: 5.1 MMOL/L (ref 3.5–5.1)
POTASSIUM SERPL-SCNC: 6.2 MMOL/L (ref 3.5–5.1)
PREALBUMIN: 4.9 MG/DL (ref 20–40)
PRO-BNP: 378 PG/ML (ref 0–449)
PROTEIN UA: 30 MG/DL
PROTHROMBIN TIME: 15.6 SEC (ref 9.9–12.7)
PROTHROMBIN TIME: 16.3 SEC (ref 9.9–12.7)
RBC # BLD: 2.79 M/UL (ref 4–5.2)
RBC # BLD: 3.17 M/UL (ref 4–5.2)
RBC # BLD: 3.22 M/UL (ref 4–5.2)
RBC # BLD: 3.36 M/UL (ref 4–5.2)
RBC # BLD: 3.63 M/UL (ref 4–5.2)
RBC UA: ABNORMAL /HPF (ref 0–4)
SARS-COV-2, NAAT: NOT DETECTED
SLIDE REVIEW: ABNORMAL
SODIUM BLD-SCNC: 131 MMOL/L (ref 136–145)
SODIUM BLD-SCNC: 135 MMOL/L (ref 136–145)
SODIUM BLD-SCNC: 136 MMOL/L (ref 136–145)
SODIUM BLD-SCNC: 137 MMOL/L (ref 136–145)
SODIUM BLD-SCNC: 138 MMOL/L (ref 136–145)
SODIUM BLD-SCNC: 141 MMOL/L (ref 136–145)
SPECIFIC GRAVITY UA: >1.03 (ref 1–1.03)
TARGET CELLS: ABNORMAL
TCO2 CALC VENOUS: 51 MMOL/L
TOTAL IRON BINDING CAPACITY: 173 UG/DL (ref 260–445)
TOTAL PROTEIN: 4.6 G/DL (ref 6.4–8.2)
TOTAL PROTEIN: 4.8 G/DL (ref 6.4–8.2)
TOTAL PROTEIN: 5 G/DL (ref 6.4–8.2)
TOTAL PROTEIN: 5.1 G/DL (ref 6.4–8.2)
TOTAL PROTEIN: 6.4 G/DL (ref 6.4–8.2)
TRANSFERRIN: 152 MG/DL (ref 200–360)
TROPONIN: <0.01 NG/ML
URINE CULTURE, ROUTINE: NORMAL
URINE REFLEX TO CULTURE: YES
URINE TYPE: ABNORMAL
UROBILINOGEN, URINE: 1 E.U./DL
VITAMIN B-12: 1634 PG/ML (ref 211–911)
WBC # BLD: 12.9 K/UL (ref 4–11)
WBC # BLD: 14.5 K/UL (ref 4–11)
WBC # BLD: 16.5 K/UL (ref 4–11)
WBC # BLD: 20.6 K/UL (ref 4–11)
WBC # BLD: 20.7 K/UL (ref 4–11)
WBC UA: 10 /HPF (ref 0–5)

## 2022-01-01 PROCEDURE — 2500000003 HC RX 250 WO HCPCS: Performed by: NURSE PRACTITIONER

## 2022-01-01 PROCEDURE — 97535 SELF CARE MNGMENT TRAINING: CPT

## 2022-01-01 PROCEDURE — 99232 SBSQ HOSP IP/OBS MODERATE 35: CPT | Performed by: SURGERY

## 2022-01-01 PROCEDURE — 83605 ASSAY OF LACTIC ACID: CPT

## 2022-01-01 PROCEDURE — 80076 HEPATIC FUNCTION PANEL: CPT

## 2022-01-01 PROCEDURE — 6360000002 HC RX W HCPCS: Performed by: INTERNAL MEDICINE

## 2022-01-01 PROCEDURE — 3700000001 HC ADD 15 MINUTES (ANESTHESIA): Performed by: INTERNAL MEDICINE

## 2022-01-01 PROCEDURE — 6370000000 HC RX 637 (ALT 250 FOR IP): Performed by: INTERNAL MEDICINE

## 2022-01-01 PROCEDURE — 36415 COLL VENOUS BLD VENIPUNCTURE: CPT

## 2022-01-01 PROCEDURE — 92526 ORAL FUNCTION THERAPY: CPT

## 2022-01-01 PROCEDURE — 85730 THROMBOPLASTIN TIME PARTIAL: CPT

## 2022-01-01 PROCEDURE — 87040 BLOOD CULTURE FOR BACTERIA: CPT

## 2022-01-01 PROCEDURE — 94640 AIRWAY INHALATION TREATMENT: CPT

## 2022-01-01 PROCEDURE — 2500000003 HC RX 250 WO HCPCS: Performed by: PHYSICIAN ASSISTANT

## 2022-01-01 PROCEDURE — 80048 BASIC METABOLIC PNL TOTAL CA: CPT

## 2022-01-01 PROCEDURE — 84100 ASSAY OF PHOSPHORUS: CPT

## 2022-01-01 PROCEDURE — C9113 INJ PANTOPRAZOLE SODIUM, VIA: HCPCS | Performed by: INTERNAL MEDICINE

## 2022-01-01 PROCEDURE — 6360000002 HC RX W HCPCS: Performed by: PHYSICIAN ASSISTANT

## 2022-01-01 PROCEDURE — 3609020800 HC EGD W/EUS FNA: Performed by: INTERNAL MEDICINE

## 2022-01-01 PROCEDURE — 82105 ALPHA-FETOPROTEIN SERUM: CPT

## 2022-01-01 PROCEDURE — 85025 COMPLETE CBC W/AUTO DIFF WBC: CPT

## 2022-01-01 PROCEDURE — APPNB30 APP NON BILLABLE TIME 0-30 MINS: Performed by: NURSE PRACTITIONER

## 2022-01-01 PROCEDURE — 1200000000 HC SEMI PRIVATE

## 2022-01-01 PROCEDURE — 2580000003 HC RX 258: Performed by: INTERNAL MEDICINE

## 2022-01-01 PROCEDURE — 6360000002 HC RX W HCPCS: Performed by: NURSE PRACTITIONER

## 2022-01-01 PROCEDURE — APPSS15 APP SPLIT SHARED TIME 0-15 MINUTES: Performed by: NURSE PRACTITIONER

## 2022-01-01 PROCEDURE — 3700000000 HC ANESTHESIA ATTENDED CARE: Performed by: INTERNAL MEDICINE

## 2022-01-01 PROCEDURE — 2700000000 HC OXYGEN THERAPY PER DAY

## 2022-01-01 PROCEDURE — 92950 HEART/LUNG RESUSCITATION CPR: CPT

## 2022-01-01 PROCEDURE — 92610 EVALUATE SWALLOWING FUNCTION: CPT

## 2022-01-01 PROCEDURE — 2500000003 HC RX 250 WO HCPCS: Performed by: INTERNAL MEDICINE

## 2022-01-01 PROCEDURE — 82140 ASSAY OF AMMONIA: CPT

## 2022-01-01 PROCEDURE — 97530 THERAPEUTIC ACTIVITIES: CPT

## 2022-01-01 PROCEDURE — 80053 COMPREHEN METABOLIC PANEL: CPT

## 2022-01-01 PROCEDURE — 6360000002 HC RX W HCPCS: Performed by: STUDENT IN AN ORGANIZED HEALTH CARE EDUCATION/TRAINING PROGRAM

## 2022-01-01 PROCEDURE — 96367 TX/PROPH/DG ADDL SEQ IV INF: CPT

## 2022-01-01 PROCEDURE — 2709999900 HC NON-CHARGEABLE SUPPLY: Performed by: INTERNAL MEDICINE

## 2022-01-01 PROCEDURE — 84134 ASSAY OF PREALBUMIN: CPT

## 2022-01-01 PROCEDURE — 70450 CT HEAD/BRAIN W/O DYE: CPT

## 2022-01-01 PROCEDURE — 84466 ASSAY OF TRANSFERRIN: CPT

## 2022-01-01 PROCEDURE — 94761 N-INVAS EAR/PLS OXIMETRY MLT: CPT

## 2022-01-01 PROCEDURE — 83540 ASSAY OF IRON: CPT

## 2022-01-01 PROCEDURE — 2500000003 HC RX 250 WO HCPCS: Performed by: NURSE ANESTHETIST, CERTIFIED REGISTERED

## 2022-01-01 PROCEDURE — 7100000000 HC PACU RECOVERY - FIRST 15 MIN: Performed by: INTERNAL MEDICINE

## 2022-01-01 PROCEDURE — 71045 X-RAY EXAM CHEST 1 VIEW: CPT

## 2022-01-01 PROCEDURE — 97165 OT EVAL LOW COMPLEX 30 MIN: CPT

## 2022-01-01 PROCEDURE — 88173 CYTOPATH EVAL FNA REPORT: CPT

## 2022-01-01 PROCEDURE — 2580000003 HC RX 258: Performed by: PHYSICIAN ASSISTANT

## 2022-01-01 PROCEDURE — 94660 CPAP INITIATION&MGMT: CPT

## 2022-01-01 PROCEDURE — 2580000003 HC RX 258: Performed by: NURSE PRACTITIONER

## 2022-01-01 PROCEDURE — 6370000000 HC RX 637 (ALT 250 FOR IP): Performed by: NURSE PRACTITIONER

## 2022-01-01 PROCEDURE — 96376 TX/PRO/DX INJ SAME DRUG ADON: CPT

## 2022-01-01 PROCEDURE — 86301 IMMUNOASSAY TUMOR CA 19-9: CPT

## 2022-01-01 PROCEDURE — 2580000003 HC RX 258: Performed by: NURSE ANESTHETIST, CERTIFIED REGISTERED

## 2022-01-01 PROCEDURE — C1874 STENT, COATED/COV W/DEL SYS: HCPCS | Performed by: INTERNAL MEDICINE

## 2022-01-01 PROCEDURE — 3609015100 HC ERCP STENT PLACEMENT BILIARY/PANCREATIC DUCT: Performed by: INTERNAL MEDICINE

## 2022-01-01 PROCEDURE — 6360000002 HC RX W HCPCS: Performed by: HOSPITALIST

## 2022-01-01 PROCEDURE — 82378 CARCINOEMBRYONIC ANTIGEN: CPT

## 2022-01-01 PROCEDURE — 96365 THER/PROPH/DIAG IV INF INIT: CPT

## 2022-01-01 PROCEDURE — 85610 PROTHROMBIN TIME: CPT

## 2022-01-01 PROCEDURE — 99285 EMERGENCY DEPT VISIT HI MDM: CPT

## 2022-01-01 PROCEDURE — 72125 CT NECK SPINE W/O DYE: CPT

## 2022-01-01 PROCEDURE — 0FBG3ZX EXCISION OF PANCREAS, PERCUTANEOUS APPROACH, DIAGNOSTIC: ICD-10-PCS | Performed by: INTERNAL MEDICINE

## 2022-01-01 PROCEDURE — 87635 SARS-COV-2 COVID-19 AMP PRB: CPT

## 2022-01-01 PROCEDURE — 99283 EMERGENCY DEPT VISIT LOW MDM: CPT

## 2022-01-01 PROCEDURE — 83690 ASSAY OF LIPASE: CPT

## 2022-01-01 PROCEDURE — 80143 DRUG ASSAY ACETAMINOPHEN: CPT

## 2022-01-01 PROCEDURE — 82728 ASSAY OF FERRITIN: CPT

## 2022-01-01 PROCEDURE — 84484 ASSAY OF TROPONIN QUANT: CPT

## 2022-01-01 PROCEDURE — 88305 TISSUE EXAM BY PATHOLOGIST: CPT

## 2022-01-01 PROCEDURE — 7100000001 HC PACU RECOVERY - ADDTL 15 MIN: Performed by: INTERNAL MEDICINE

## 2022-01-01 PROCEDURE — 96375 TX/PRO/DX INJ NEW DRUG ADDON: CPT

## 2022-01-01 PROCEDURE — 82746 ASSAY OF FOLIC ACID SERUM: CPT

## 2022-01-01 PROCEDURE — C1753 CATH, INTRAVAS ULTRASOUND: HCPCS | Performed by: INTERNAL MEDICINE

## 2022-01-01 PROCEDURE — 6370000000 HC RX 637 (ALT 250 FOR IP): Performed by: HOSPITALIST

## 2022-01-01 PROCEDURE — 93010 ELECTROCARDIOGRAM REPORT: CPT | Performed by: INTERNAL MEDICINE

## 2022-01-01 PROCEDURE — 99223 1ST HOSP IP/OBS HIGH 75: CPT | Performed by: SURGERY

## 2022-01-01 PROCEDURE — 93005 ELECTROCARDIOGRAM TRACING: CPT | Performed by: PHYSICIAN ASSISTANT

## 2022-01-01 PROCEDURE — 83550 IRON BINDING TEST: CPT

## 2022-01-01 PROCEDURE — 6360000004 HC RX CONTRAST MEDICATION: Performed by: PHYSICIAN ASSISTANT

## 2022-01-01 PROCEDURE — 87086 URINE CULTURE/COLONY COUNT: CPT

## 2022-01-01 PROCEDURE — 88342 IMHCHEM/IMCYTCHM 1ST ANTB: CPT

## 2022-01-01 PROCEDURE — 82607 VITAMIN B-12: CPT

## 2022-01-01 PROCEDURE — 0F798DZ DILATION OF COMMON BILE DUCT WITH INTRALUMINAL DEVICE, VIA NATURAL OR ARTIFICIAL OPENING ENDOSCOPIC: ICD-10-PCS | Performed by: INTERNAL MEDICINE

## 2022-01-01 PROCEDURE — 81003 URINALYSIS AUTO W/O SCOPE: CPT

## 2022-01-01 PROCEDURE — 97162 PT EVAL MOD COMPLEX 30 MIN: CPT

## 2022-01-01 PROCEDURE — 6360000002 HC RX W HCPCS: Performed by: NURSE ANESTHETIST, CERTIFIED REGISTERED

## 2022-01-01 PROCEDURE — 2720000010 HC SURG SUPPLY STERILE: Performed by: INTERNAL MEDICINE

## 2022-01-01 PROCEDURE — 6370000000 HC RX 637 (ALT 250 FOR IP): Performed by: NURSE ANESTHETIST, CERTIFIED REGISTERED

## 2022-01-01 PROCEDURE — 74177 CT ABD & PELVIS W/CONTRAST: CPT

## 2022-01-01 PROCEDURE — 83880 ASSAY OF NATRIURETIC PEPTIDE: CPT

## 2022-01-01 PROCEDURE — 82803 BLOOD GASES ANY COMBINATION: CPT

## 2022-01-01 PROCEDURE — 88341 IMHCHEM/IMCYTCHM EA ADD ANTB: CPT

## 2022-01-01 PROCEDURE — 74328 X-RAY BILE DUCT ENDOSCOPY: CPT

## 2022-01-01 DEVICE — STENT SYSTEM RMV
Type: IMPLANTABLE DEVICE | Status: FUNCTIONAL
Brand: WALLFLEX BILIARY

## 2022-01-01 RX ORDER — ACETAMINOPHEN 325 MG/1
650 TABLET ORAL EVERY 6 HOURS PRN
Status: DISCONTINUED | OUTPATIENT
Start: 2022-01-01 | End: 2022-01-01 | Stop reason: HOSPADM

## 2022-01-01 RX ORDER — SODIUM CHLORIDE 9 MG/ML
INJECTION, SOLUTION INTRAVENOUS CONTINUOUS
Status: DISCONTINUED | OUTPATIENT
Start: 2022-01-01 | End: 2022-01-01 | Stop reason: HOSPADM

## 2022-01-01 RX ORDER — ALBUTEROL SULFATE 90 UG/1
2 AEROSOL, METERED RESPIRATORY (INHALATION) EVERY 6 HOURS PRN
Status: DISCONTINUED | OUTPATIENT
Start: 2022-01-01 | End: 2022-01-01

## 2022-01-01 RX ORDER — HYDROMORPHONE HCL 110MG/55ML
1 PATIENT CONTROLLED ANALGESIA SYRINGE INTRAVENOUS EVERY 4 HOURS PRN
Status: DISCONTINUED | OUTPATIENT
Start: 2022-01-01 | End: 2022-01-01

## 2022-01-01 RX ORDER — FENTANYL CITRATE 50 UG/ML
25 INJECTION, SOLUTION INTRAMUSCULAR; INTRAVENOUS ONCE
Status: COMPLETED | OUTPATIENT
Start: 2022-01-01 | End: 2022-01-01

## 2022-01-01 RX ORDER — IPRATROPIUM BROMIDE AND ALBUTEROL SULFATE 2.5; .5 MG/3ML; MG/3ML
1 SOLUTION RESPIRATORY (INHALATION) 3 TIMES DAILY
Status: DISCONTINUED | OUTPATIENT
Start: 2022-01-01 | End: 2022-01-01 | Stop reason: HOSPADM

## 2022-01-01 RX ORDER — MORPHINE SULFATE 100 MG/5ML
10 SOLUTION ORAL
Qty: 15 ML | Refills: 0 | Status: SHIPPED | OUTPATIENT
Start: 2022-01-01 | End: 2022-01-01

## 2022-01-01 RX ORDER — ONDANSETRON 2 MG/ML
4 INJECTION INTRAMUSCULAR; INTRAVENOUS ONCE
Status: COMPLETED | OUTPATIENT
Start: 2022-01-01 | End: 2022-01-01

## 2022-01-01 RX ORDER — FENTANYL CITRATE 50 UG/ML
25 INJECTION, SOLUTION INTRAMUSCULAR; INTRAVENOUS EVERY 5 MIN PRN
Status: DISCONTINUED | OUTPATIENT
Start: 2022-01-01 | End: 2022-01-01 | Stop reason: HOSPADM

## 2022-01-01 RX ORDER — MORPHINE SULFATE 4 MG/ML
4 INJECTION, SOLUTION INTRAMUSCULAR; INTRAVENOUS
Status: DISCONTINUED | OUTPATIENT
Start: 2022-01-01 | End: 2022-01-01

## 2022-01-01 RX ORDER — LORAZEPAM 1 MG/1
1 TABLET ORAL NIGHTLY
Status: DISCONTINUED | OUTPATIENT
Start: 2022-01-01 | End: 2022-01-01 | Stop reason: HOSPADM

## 2022-01-01 RX ORDER — HYDROMORPHONE HCL 110MG/55ML
1.5 PATIENT CONTROLLED ANALGESIA SYRINGE INTRAVENOUS
Status: DISCONTINUED | OUTPATIENT
Start: 2022-01-01 | End: 2022-01-01 | Stop reason: HOSPADM

## 2022-01-01 RX ORDER — ALBUTEROL SULFATE 90 UG/1
2 AEROSOL, METERED RESPIRATORY (INHALATION) EVERY 4 HOURS PRN
Status: DISCONTINUED | OUTPATIENT
Start: 2022-01-01 | End: 2022-01-01 | Stop reason: HOSPADM

## 2022-01-01 RX ORDER — ACETAMINOPHEN 650 MG/1
650 SUPPOSITORY RECTAL EVERY 6 HOURS PRN
Status: DISCONTINUED | OUTPATIENT
Start: 2022-01-01 | End: 2022-01-01 | Stop reason: HOSPADM

## 2022-01-01 RX ORDER — PANTOPRAZOLE SODIUM 40 MG/1
40 TABLET, DELAYED RELEASE ORAL
Status: DISCONTINUED | OUTPATIENT
Start: 2022-01-01 | End: 2022-01-01 | Stop reason: HOSPADM

## 2022-01-01 RX ORDER — DEXTROSE MONOHYDRATE 25 G/50ML
25 INJECTION, SOLUTION INTRAVENOUS ONCE
Status: COMPLETED | OUTPATIENT
Start: 2022-01-01 | End: 2022-01-01

## 2022-01-01 RX ORDER — BUDESONIDE AND FORMOTEROL FUMARATE DIHYDRATE 160; 4.5 UG/1; UG/1
2 AEROSOL RESPIRATORY (INHALATION) 2 TIMES DAILY
Status: DISCONTINUED | OUTPATIENT
Start: 2022-01-01 | End: 2022-01-01 | Stop reason: HOSPADM

## 2022-01-01 RX ORDER — ONDANSETRON 2 MG/ML
4 INJECTION INTRAMUSCULAR; INTRAVENOUS EVERY 6 HOURS PRN
Status: DISCONTINUED | OUTPATIENT
Start: 2022-01-01 | End: 2022-01-01 | Stop reason: SDUPTHER

## 2022-01-01 RX ORDER — SODIUM CHLORIDE 0.9 % (FLUSH) 0.9 %
5-40 SYRINGE (ML) INJECTION PRN
Status: DISCONTINUED | OUTPATIENT
Start: 2022-01-01 | End: 2022-01-01 | Stop reason: HOSPADM

## 2022-01-01 RX ORDER — SODIUM CHLORIDE 9 MG/ML
25 INJECTION, SOLUTION INTRAVENOUS PRN
Status: DISCONTINUED | OUTPATIENT
Start: 2022-01-01 | End: 2022-01-01 | Stop reason: HOSPADM

## 2022-01-01 RX ORDER — HYDRALAZINE HYDROCHLORIDE 20 MG/ML
5 INJECTION INTRAMUSCULAR; INTRAVENOUS EVERY 10 MIN PRN
Status: DISCONTINUED | OUTPATIENT
Start: 2022-01-01 | End: 2022-01-01 | Stop reason: HOSPADM

## 2022-01-01 RX ORDER — LORAZEPAM 2 MG/ML
1 CONCENTRATE ORAL EVERY 8 HOURS PRN
Qty: 30 ML | Refills: 0 | Status: SHIPPED | OUTPATIENT
Start: 2022-01-01 | End: 2022-02-10

## 2022-01-01 RX ORDER — SODIUM CHLORIDE 0.9 % (FLUSH) 0.9 %
5-40 SYRINGE (ML) INJECTION EVERY 12 HOURS SCHEDULED
Status: DISCONTINUED | OUTPATIENT
Start: 2022-01-01 | End: 2022-01-01 | Stop reason: HOSPADM

## 2022-01-01 RX ORDER — NICOTINE POLACRILEX 4 MG
15 LOZENGE BUCCAL PRN
Status: DISCONTINUED | OUTPATIENT
Start: 2022-01-01 | End: 2022-01-01 | Stop reason: HOSPADM

## 2022-01-01 RX ORDER — PREGABALIN 75 MG/1
150 CAPSULE ORAL 2 TIMES DAILY
Status: DISCONTINUED | OUTPATIENT
Start: 2022-01-01 | End: 2022-01-01 | Stop reason: HOSPADM

## 2022-01-01 RX ORDER — MEPERIDINE HYDROCHLORIDE 25 MG/ML
12.5 INJECTION INTRAMUSCULAR; INTRAVENOUS; SUBCUTANEOUS EVERY 5 MIN PRN
Status: DISCONTINUED | OUTPATIENT
Start: 2022-01-01 | End: 2022-01-01 | Stop reason: HOSPADM

## 2022-01-01 RX ORDER — METHYLPREDNISOLONE SODIUM SUCCINATE 40 MG/ML
40 INJECTION, POWDER, LYOPHILIZED, FOR SOLUTION INTRAMUSCULAR; INTRAVENOUS ONCE
Status: COMPLETED | OUTPATIENT
Start: 2022-01-01 | End: 2022-01-01

## 2022-01-01 RX ORDER — SUCCINYLCHOLINE/SOD CL,ISO/PF 200MG/10ML
SYRINGE (ML) INTRAVENOUS PRN
Status: DISCONTINUED | OUTPATIENT
Start: 2022-01-01 | End: 2022-01-01 | Stop reason: SDUPTHER

## 2022-01-01 RX ORDER — 0.9 % SODIUM CHLORIDE 0.9 %
500 INTRAVENOUS SOLUTION INTRAVENOUS ONCE
Status: COMPLETED | OUTPATIENT
Start: 2022-01-01 | End: 2022-01-01

## 2022-01-01 RX ORDER — GLYCOPYRROLATE 1 MG/5 ML
SYRINGE (ML) INTRAVENOUS PRN
Status: DISCONTINUED | OUTPATIENT
Start: 2022-01-01 | End: 2022-01-01 | Stop reason: SDUPTHER

## 2022-01-01 RX ORDER — EXEMESTANE 25 MG/1
25 TABLET ORAL DAILY
Status: CANCELLED | OUTPATIENT
Start: 2022-01-01

## 2022-01-01 RX ORDER — LEVOTHYROXINE SODIUM 0.03 MG/1
50 TABLET ORAL DAILY
Status: DISCONTINUED | OUTPATIENT
Start: 2022-01-01 | End: 2022-01-01 | Stop reason: HOSPADM

## 2022-01-01 RX ORDER — POLYETHYLENE GLYCOL 3350 17 G/17G
17 POWDER, FOR SOLUTION ORAL DAILY PRN
Status: DISCONTINUED | OUTPATIENT
Start: 2022-01-01 | End: 2022-01-01 | Stop reason: HOSPADM

## 2022-01-01 RX ORDER — LEVALBUTEROL INHALATION SOLUTION 0.63 MG/3ML
0.63 SOLUTION RESPIRATORY (INHALATION) EVERY 6 HOURS PRN
Status: DISCONTINUED | OUTPATIENT
Start: 2022-01-01 | End: 2022-01-01 | Stop reason: HOSPADM

## 2022-01-01 RX ORDER — LABETALOL HYDROCHLORIDE 5 MG/ML
5 INJECTION, SOLUTION INTRAVENOUS EVERY 10 MIN PRN
Status: DISCONTINUED | OUTPATIENT
Start: 2022-01-01 | End: 2022-01-01 | Stop reason: HOSPADM

## 2022-01-01 RX ORDER — NICOTINE 21 MG/24HR
1 PATCH, TRANSDERMAL 24 HOURS TRANSDERMAL DAILY
Status: DISCONTINUED | OUTPATIENT
Start: 2022-01-01 | End: 2022-01-01 | Stop reason: HOSPADM

## 2022-01-01 RX ORDER — ONDANSETRON 2 MG/ML
INJECTION INTRAMUSCULAR; INTRAVENOUS PRN
Status: DISCONTINUED | OUTPATIENT
Start: 2022-01-01 | End: 2022-01-01 | Stop reason: SDUPTHER

## 2022-01-01 RX ORDER — CIPROFLOXACIN 500 MG/1
500 TABLET, FILM COATED ORAL 2 TIMES DAILY
Qty: 20 TABLET | Refills: 0 | Status: SHIPPED | OUTPATIENT
Start: 2022-01-01 | End: 2022-02-06

## 2022-01-01 RX ORDER — MORPHINE SULFATE 4 MG/ML
4 INJECTION, SOLUTION INTRAMUSCULAR; INTRAVENOUS EVERY 30 MIN PRN
Status: DISCONTINUED | OUTPATIENT
Start: 2022-01-01 | End: 2022-01-01

## 2022-01-01 RX ORDER — SODIUM CHLORIDE 9 MG/ML
INJECTION, SOLUTION INTRAVENOUS CONTINUOUS PRN
Status: DISCONTINUED | OUTPATIENT
Start: 2022-01-01 | End: 2022-01-01 | Stop reason: SDUPTHER

## 2022-01-01 RX ORDER — EPINEPHRINE 1 MG/ML
INJECTION, SOLUTION, CONCENTRATE INTRAVENOUS DAILY PRN
Status: COMPLETED | OUTPATIENT
Start: 2022-01-01 | End: 2022-01-01

## 2022-01-01 RX ORDER — MORPHINE SULFATE 2 MG/ML
2 INJECTION, SOLUTION INTRAMUSCULAR; INTRAVENOUS
Status: DISCONTINUED | OUTPATIENT
Start: 2022-01-01 | End: 2022-01-01

## 2022-01-01 RX ORDER — ONDANSETRON 2 MG/ML
4 INJECTION INTRAMUSCULAR; INTRAVENOUS EVERY 6 HOURS PRN
Status: DISCONTINUED | OUTPATIENT
Start: 2022-01-01 | End: 2022-01-01 | Stop reason: HOSPADM

## 2022-01-01 RX ORDER — DEXTROSE MONOHYDRATE 50 MG/ML
100 INJECTION, SOLUTION INTRAVENOUS PRN
Status: DISCONTINUED | OUTPATIENT
Start: 2022-01-01 | End: 2022-01-01 | Stop reason: HOSPADM

## 2022-01-01 RX ORDER — ONDANSETRON 2 MG/ML
4 INJECTION INTRAMUSCULAR; INTRAVENOUS
Status: DISCONTINUED | OUTPATIENT
Start: 2022-01-01 | End: 2022-01-01 | Stop reason: HOSPADM

## 2022-01-01 RX ORDER — AMLODIPINE BESYLATE 5 MG/1
5 TABLET ORAL DAILY
Status: DISCONTINUED | OUTPATIENT
Start: 2022-01-01 | End: 2022-01-01 | Stop reason: HOSPADM

## 2022-01-01 RX ORDER — IPRATROPIUM BROMIDE AND ALBUTEROL SULFATE 2.5; .5 MG/3ML; MG/3ML
1 SOLUTION RESPIRATORY (INHALATION)
Status: DISCONTINUED | OUTPATIENT
Start: 2022-01-01 | End: 2022-01-01

## 2022-01-01 RX ORDER — DEXTROSE MONOHYDRATE 25 G/50ML
12.5 INJECTION, SOLUTION INTRAVENOUS PRN
Status: DISCONTINUED | OUTPATIENT
Start: 2022-01-01 | End: 2022-01-01 | Stop reason: HOSPADM

## 2022-01-01 RX ORDER — ROCURONIUM BROMIDE 10 MG/ML
INJECTION, SOLUTION INTRAVENOUS PRN
Status: DISCONTINUED | OUTPATIENT
Start: 2022-01-01 | End: 2022-01-01 | Stop reason: SDUPTHER

## 2022-01-01 RX ORDER — LIDOCAINE HYDROCHLORIDE 20 MG/ML
INJECTION, SOLUTION EPIDURAL; INFILTRATION; INTRACAUDAL; PERINEURAL PRN
Status: DISCONTINUED | OUTPATIENT
Start: 2022-01-01 | End: 2022-01-01 | Stop reason: SDUPTHER

## 2022-01-01 RX ORDER — FENTANYL CITRATE 50 UG/ML
INJECTION, SOLUTION INTRAMUSCULAR; INTRAVENOUS PRN
Status: DISCONTINUED | OUTPATIENT
Start: 2022-01-01 | End: 2022-01-01 | Stop reason: SDUPTHER

## 2022-01-01 RX ORDER — ALBUTEROL SULFATE 90 UG/1
AEROSOL, METERED RESPIRATORY (INHALATION) PRN
Status: DISCONTINUED | OUTPATIENT
Start: 2022-01-01 | End: 2022-01-01 | Stop reason: SDUPTHER

## 2022-01-01 RX ORDER — LORAZEPAM 2 MG/ML
1 INJECTION INTRAMUSCULAR ONCE
Status: COMPLETED | OUTPATIENT
Start: 2022-01-01 | End: 2022-01-01

## 2022-01-01 RX ORDER — PROPOFOL 10 MG/ML
INJECTION, EMULSION INTRAVENOUS PRN
Status: DISCONTINUED | OUTPATIENT
Start: 2022-01-01 | End: 2022-01-01 | Stop reason: SDUPTHER

## 2022-01-01 RX ORDER — DULOXETIN HYDROCHLORIDE 30 MG/1
30 CAPSULE, DELAYED RELEASE ORAL DAILY
Status: DISCONTINUED | OUTPATIENT
Start: 2022-01-01 | End: 2022-01-01 | Stop reason: HOSPADM

## 2022-01-01 RX ORDER — PHENYLEPHRINE HCL IN 0.9% NACL 1 MG/10 ML
SYRINGE (ML) INTRAVENOUS PRN
Status: DISCONTINUED | OUTPATIENT
Start: 2022-01-01 | End: 2022-01-01 | Stop reason: SDUPTHER

## 2022-01-01 RX ORDER — FENTANYL CITRATE 50 UG/ML
50 INJECTION, SOLUTION INTRAMUSCULAR; INTRAVENOUS EVERY 5 MIN PRN
Status: DISCONTINUED | OUTPATIENT
Start: 2022-01-01 | End: 2022-01-01 | Stop reason: HOSPADM

## 2022-01-01 RX ORDER — ONDANSETRON 4 MG/1
4 TABLET, ORALLY DISINTEGRATING ORAL EVERY 8 HOURS PRN
Status: DISCONTINUED | OUTPATIENT
Start: 2022-01-01 | End: 2022-01-01 | Stop reason: HOSPADM

## 2022-01-01 RX ORDER — DEXAMETHASONE SODIUM PHOSPHATE 4 MG/ML
INJECTION, SOLUTION INTRA-ARTICULAR; INTRALESIONAL; INTRAMUSCULAR; INTRAVENOUS; SOFT TISSUE PRN
Status: DISCONTINUED | OUTPATIENT
Start: 2022-01-01 | End: 2022-01-01 | Stop reason: SDUPTHER

## 2022-01-01 RX ADMIN — LORAZEPAM 1 MG: 1 TABLET ORAL at 21:02

## 2022-01-01 RX ADMIN — DEXTROSE MONOHYDRATE 25 G: 25 INJECTION, SOLUTION INTRAVENOUS at 06:20

## 2022-01-01 RX ADMIN — CEFEPIME HYDROCHLORIDE 2000 MG: 2 INJECTION, POWDER, FOR SOLUTION INTRAVENOUS at 20:58

## 2022-01-01 RX ADMIN — LEVOTHYROXINE SODIUM 50 MCG: 0.03 TABLET ORAL at 06:07

## 2022-01-01 RX ADMIN — Medication 50 MCG: at 15:44

## 2022-01-01 RX ADMIN — HYDROMORPHONE HYDROCHLORIDE 1 MG: 2 INJECTION, SOLUTION INTRAMUSCULAR; INTRAVENOUS; SUBCUTANEOUS at 05:47

## 2022-01-01 RX ADMIN — DULOXETINE HYDROCHLORIDE 30 MG: 30 CAPSULE, DELAYED RELEASE ORAL at 08:54

## 2022-01-01 RX ADMIN — ROCURONIUM BROMIDE 10 MG: 10 INJECTION, SOLUTION INTRAVENOUS at 16:51

## 2022-01-01 RX ADMIN — FENTANYL CITRATE 25 MCG: 50 INJECTION, SOLUTION INTRAMUSCULAR; INTRAVENOUS at 15:55

## 2022-01-01 RX ADMIN — PREGABALIN 150 MG: 75 CAPSULE ORAL at 09:18

## 2022-01-01 RX ADMIN — LORAZEPAM 1 MG: 1 TABLET ORAL at 21:43

## 2022-01-01 RX ADMIN — Medication 2 PUFF: at 10:08

## 2022-01-01 RX ADMIN — HYDROMORPHONE HYDROCHLORIDE 1 MG: 2 INJECTION, SOLUTION INTRAMUSCULAR; INTRAVENOUS; SUBCUTANEOUS at 16:31

## 2022-01-01 RX ADMIN — PREGABALIN 150 MG: 75 CAPSULE ORAL at 08:54

## 2022-01-01 RX ADMIN — MORPHINE SULFATE 4 MG: 4 INJECTION, SOLUTION INTRAMUSCULAR; INTRAVENOUS at 22:27

## 2022-01-01 RX ADMIN — FENTANYL CITRATE 25 MCG: 50 INJECTION, SOLUTION INTRAMUSCULAR; INTRAVENOUS at 15:44

## 2022-01-01 RX ADMIN — METRONIDAZOLE 500 MG: 500 INJECTION, SOLUTION INTRAVENOUS at 21:36

## 2022-01-01 RX ADMIN — Medication 100 MG: at 15:44

## 2022-01-01 RX ADMIN — HYDROMORPHONE HYDROCHLORIDE 1.5 MG: 2 INJECTION, SOLUTION INTRAMUSCULAR; INTRAVENOUS; SUBCUTANEOUS at 02:27

## 2022-01-01 RX ADMIN — METRONIDAZOLE 500 MG: 500 INJECTION, SOLUTION INTRAVENOUS at 00:40

## 2022-01-01 RX ADMIN — SODIUM BICARBONATE 50 MEQ: 84 INJECTION, SOLUTION INTRAVENOUS at 06:21

## 2022-01-01 RX ADMIN — SODIUM CHLORIDE 25 ML: 9 INJECTION, SOLUTION INTRAVENOUS at 22:13

## 2022-01-01 RX ADMIN — IPRATROPIUM BROMIDE AND ALBUTEROL SULFATE 1 AMPULE: .5; 3 SOLUTION RESPIRATORY (INHALATION) at 20:10

## 2022-01-01 RX ADMIN — SODIUM CHLORIDE: 9 INJECTION, SOLUTION INTRAVENOUS at 15:39

## 2022-01-01 RX ADMIN — MORPHINE SULFATE 2 MG: 2 INJECTION, SOLUTION INTRAMUSCULAR; INTRAVENOUS at 10:55

## 2022-01-01 RX ADMIN — METRONIDAZOLE 500 MG: 500 INJECTION, SOLUTION INTRAVENOUS at 22:19

## 2022-01-01 RX ADMIN — PREGABALIN 150 MG: 75 CAPSULE ORAL at 21:43

## 2022-01-01 RX ADMIN — PROPOFOL 50 MG: 10 INJECTION, EMULSION INTRAVENOUS at 15:44

## 2022-01-01 RX ADMIN — IPRATROPIUM BROMIDE AND ALBUTEROL SULFATE 1 AMPULE: .5; 3 SOLUTION RESPIRATORY (INHALATION) at 08:50

## 2022-01-01 RX ADMIN — PREGABALIN 150 MG: 75 CAPSULE ORAL at 21:50

## 2022-01-01 RX ADMIN — LORAZEPAM 1 MG: 2 INJECTION INTRAMUSCULAR; INTRAVENOUS at 22:27

## 2022-01-01 RX ADMIN — METRONIDAZOLE 500 MG: 500 INJECTION, SOLUTION INTRAVENOUS at 05:51

## 2022-01-01 RX ADMIN — HYDROMORPHONE HYDROCHLORIDE 1 MG: 2 INJECTION, SOLUTION INTRAMUSCULAR; INTRAVENOUS; SUBCUTANEOUS at 01:00

## 2022-01-01 RX ADMIN — CEFEPIME HYDROCHLORIDE 2000 MG: 2 INJECTION, POWDER, FOR SOLUTION INTRAVENOUS at 09:23

## 2022-01-01 RX ADMIN — FENTANYL CITRATE 25 MCG: 50 INJECTION, SOLUTION INTRAMUSCULAR; INTRAVENOUS at 16:53

## 2022-01-01 RX ADMIN — FENTANYL CITRATE 25 MCG: 50 INJECTION INTRAMUSCULAR; INTRAVENOUS at 19:00

## 2022-01-01 RX ADMIN — ONDANSETRON 4 MG: 2 INJECTION INTRAMUSCULAR; INTRAVENOUS at 15:50

## 2022-01-01 RX ADMIN — PREGABALIN 150 MG: 75 CAPSULE ORAL at 09:37

## 2022-01-01 RX ADMIN — SODIUM CHLORIDE 25 ML: 9 INJECTION, SOLUTION INTRAVENOUS at 09:17

## 2022-01-01 RX ADMIN — HYDROMORPHONE HYDROCHLORIDE 1.5 MG: 2 INJECTION, SOLUTION INTRAMUSCULAR; INTRAVENOUS; SUBCUTANEOUS at 09:12

## 2022-01-01 RX ADMIN — AMLODIPINE BESYLATE 5 MG: 5 TABLET ORAL at 08:41

## 2022-01-01 RX ADMIN — METRONIDAZOLE 500 MG: 500 INJECTION, SOLUTION INTRAVENOUS at 05:49

## 2022-01-01 RX ADMIN — Medication 2 PUFF: at 20:10

## 2022-01-01 RX ADMIN — SUGAMMADEX 200 MG: 100 INJECTION, SOLUTION INTRAVENOUS at 17:12

## 2022-01-01 RX ADMIN — DULOXETINE HYDROCHLORIDE 30 MG: 30 CAPSULE, DELAYED RELEASE ORAL at 09:37

## 2022-01-01 RX ADMIN — CEFEPIME HYDROCHLORIDE 2000 MG: 2 INJECTION, POWDER, FOR SOLUTION INTRAVENOUS at 08:48

## 2022-01-01 RX ADMIN — LEVOTHYROXINE SODIUM 50 MCG: 0.03 TABLET ORAL at 06:20

## 2022-01-01 RX ADMIN — AMLODIPINE BESYLATE 5 MG: 5 TABLET ORAL at 08:54

## 2022-01-01 RX ADMIN — SODIUM CHLORIDE: 9 INJECTION, SOLUTION INTRAVENOUS at 22:41

## 2022-01-01 RX ADMIN — Medication 0.2 MG: at 15:44

## 2022-01-01 RX ADMIN — METRONIDAZOLE 500 MG: 500 INJECTION, SOLUTION INTRAVENOUS at 22:46

## 2022-01-01 RX ADMIN — CEFEPIME HYDROCHLORIDE 2000 MG: 2 INJECTION, POWDER, FOR SOLUTION INTRAVENOUS at 22:16

## 2022-01-01 RX ADMIN — INSULIN HUMAN 10 UNITS: 100 INJECTION, SOLUTION PARENTERAL at 06:43

## 2022-01-01 RX ADMIN — EPINEPHRINE 1 MG: 1 INJECTION, SOLUTION INTRAMUSCULAR; SUBCUTANEOUS at 18:36

## 2022-01-01 RX ADMIN — METRONIDAZOLE 500 MG: 500 INJECTION, SOLUTION INTRAVENOUS at 14:34

## 2022-01-01 RX ADMIN — IPRATROPIUM BROMIDE AND ALBUTEROL SULFATE 1 AMPULE: .5; 3 SOLUTION RESPIRATORY (INHALATION) at 11:28

## 2022-01-01 RX ADMIN — IPRATROPIUM BROMIDE AND ALBUTEROL SULFATE 1 AMPULE: .5; 3 SOLUTION RESPIRATORY (INHALATION) at 13:07

## 2022-01-01 RX ADMIN — ONDANSETRON 4 MG: 2 INJECTION INTRAMUSCULAR; INTRAVENOUS at 18:59

## 2022-01-01 RX ADMIN — METRONIDAZOLE 500 MG: 500 INJECTION, SOLUTION INTRAVENOUS at 06:14

## 2022-01-01 RX ADMIN — HYDROMORPHONE HYDROCHLORIDE 1.5 MG: 2 INJECTION, SOLUTION INTRAMUSCULAR; INTRAVENOUS; SUBCUTANEOUS at 18:46

## 2022-01-01 RX ADMIN — CEFEPIME HYDROCHLORIDE 2000 MG: 2 INJECTION, POWDER, FOR SOLUTION INTRAVENOUS at 08:54

## 2022-01-01 RX ADMIN — MORPHINE SULFATE 4 MG: 4 INJECTION, SOLUTION INTRAMUSCULAR; INTRAVENOUS at 20:24

## 2022-01-01 RX ADMIN — Medication 2 PUFF: at 12:36

## 2022-01-01 RX ADMIN — HYDROMORPHONE HYDROCHLORIDE 1.5 MG: 2 INJECTION, SOLUTION INTRAMUSCULAR; INTRAVENOUS; SUBCUTANEOUS at 06:03

## 2022-01-01 RX ADMIN — PANTOPRAZOLE SODIUM 40 MG: 40 TABLET, DELAYED RELEASE ORAL at 05:46

## 2022-01-01 RX ADMIN — Medication 2 PUFF: at 11:29

## 2022-01-01 RX ADMIN — FENTANYL CITRATE 25 MCG: 50 INJECTION, SOLUTION INTRAMUSCULAR; INTRAVENOUS at 16:00

## 2022-01-01 RX ADMIN — HYDROMORPHONE HYDROCHLORIDE 1.5 MG: 2 INJECTION, SOLUTION INTRAMUSCULAR; INTRAVENOUS; SUBCUTANEOUS at 14:38

## 2022-01-01 RX ADMIN — LEVOTHYROXINE SODIUM 50 MCG: 0.03 TABLET ORAL at 05:46

## 2022-01-01 RX ADMIN — SODIUM CHLORIDE 500 ML: 9 INJECTION, SOLUTION INTRAVENOUS at 20:18

## 2022-01-01 RX ADMIN — IPRATROPIUM BROMIDE AND ALBUTEROL SULFATE 1 AMPULE: .5; 3 SOLUTION RESPIRATORY (INHALATION) at 16:44

## 2022-01-01 RX ADMIN — DULOXETINE HYDROCHLORIDE 30 MG: 30 CAPSULE, DELAYED RELEASE ORAL at 08:41

## 2022-01-01 RX ADMIN — PREGABALIN 150 MG: 75 CAPSULE ORAL at 08:41

## 2022-01-01 RX ADMIN — PANTOPRAZOLE SODIUM 40 MG: 40 TABLET, DELAYED RELEASE ORAL at 06:07

## 2022-01-01 RX ADMIN — AMLODIPINE BESYLATE 5 MG: 5 TABLET ORAL at 09:18

## 2022-01-01 RX ADMIN — CEFEPIME HYDROCHLORIDE 2000 MG: 2 INJECTION, POWDER, FOR SOLUTION INTRAVENOUS at 21:12

## 2022-01-01 RX ADMIN — IPRATROPIUM BROMIDE AND ALBUTEROL SULFATE 1 AMPULE: .5; 3 SOLUTION RESPIRATORY (INHALATION) at 10:08

## 2022-01-01 RX ADMIN — HYDROMORPHONE HYDROCHLORIDE 1.5 MG: 2 INJECTION, SOLUTION INTRAMUSCULAR; INTRAVENOUS; SUBCUTANEOUS at 12:46

## 2022-01-01 RX ADMIN — LORAZEPAM 1 MG: 1 TABLET ORAL at 01:22

## 2022-01-01 RX ADMIN — METRONIDAZOLE 500 MG: 500 INJECTION, SOLUTION INTRAVENOUS at 13:36

## 2022-01-01 RX ADMIN — METRONIDAZOLE 500 MG: 500 INJECTION, SOLUTION INTRAVENOUS at 13:21

## 2022-01-01 RX ADMIN — ALBUTEROL SULFATE 6 PUFF: 90 AEROSOL, METERED RESPIRATORY (INHALATION) at 17:12

## 2022-01-01 RX ADMIN — IOPAMIDOL 75 ML: 755 INJECTION, SOLUTION INTRAVENOUS at 19:47

## 2022-01-01 RX ADMIN — Medication 2 PUFF: at 08:50

## 2022-01-01 RX ADMIN — AMLODIPINE BESYLATE 5 MG: 5 TABLET ORAL at 09:37

## 2022-01-01 RX ADMIN — HYDROMORPHONE HYDROCHLORIDE 1.5 MG: 2 INJECTION, SOLUTION INTRAMUSCULAR; INTRAVENOUS; SUBCUTANEOUS at 21:50

## 2022-01-01 RX ADMIN — DULOXETINE HYDROCHLORIDE 30 MG: 30 CAPSULE, DELAYED RELEASE ORAL at 09:18

## 2022-01-01 RX ADMIN — PREGABALIN 150 MG: 75 CAPSULE ORAL at 21:01

## 2022-01-01 RX ADMIN — CEFEPIME HYDROCHLORIDE 2000 MG: 2 INJECTION, POWDER, FOR SOLUTION INTRAVENOUS at 21:56

## 2022-01-01 RX ADMIN — SODIUM CHLORIDE: 9 INJECTION, SOLUTION INTRAVENOUS at 01:22

## 2022-01-01 RX ADMIN — SODIUM CHLORIDE 8 MG/HR: 9 INJECTION, SOLUTION INTRAVENOUS at 20:45

## 2022-01-01 RX ADMIN — IPRATROPIUM BROMIDE AND ALBUTEROL SULFATE 1 AMPULE: .5; 3 SOLUTION RESPIRATORY (INHALATION) at 12:35

## 2022-01-01 RX ADMIN — Medication 2 PUFF: at 05:29

## 2022-01-01 RX ADMIN — METHYLPREDNISOLONE SODIUM SUCCINATE 40 MG: 40 INJECTION, POWDER, FOR SOLUTION INTRAMUSCULAR; INTRAVENOUS at 11:42

## 2022-01-01 RX ADMIN — CEFEPIME HYDROCHLORIDE 2000 MG: 2 INJECTION, POWDER, FOR SOLUTION INTRAVENOUS at 09:51

## 2022-01-01 RX ADMIN — LIDOCAINE HYDROCHLORIDE 60 MG: 20 INJECTION, SOLUTION EPIDURAL; INFILTRATION; INTRACAUDAL; PERINEURAL at 15:44

## 2022-01-01 RX ADMIN — HYDROMORPHONE HYDROCHLORIDE 1 MG: 2 INJECTION, SOLUTION INTRAMUSCULAR; INTRAVENOUS; SUBCUTANEOUS at 21:02

## 2022-01-01 RX ADMIN — METRONIDAZOLE 500 MG: 500 INJECTION, SOLUTION INTRAVENOUS at 14:35

## 2022-01-01 RX ADMIN — HYDROMORPHONE HYDROCHLORIDE 1.5 MG: 2 INJECTION, SOLUTION INTRAMUSCULAR; INTRAVENOUS; SUBCUTANEOUS at 11:24

## 2022-01-01 RX ADMIN — LEVOTHYROXINE SODIUM 50 MCG: 0.03 TABLET ORAL at 05:53

## 2022-01-01 RX ADMIN — HYDROMORPHONE HYDROCHLORIDE 1 MG: 2 INJECTION, SOLUTION INTRAMUSCULAR; INTRAVENOUS; SUBCUTANEOUS at 09:43

## 2022-01-01 RX ADMIN — HYDROMORPHONE HYDROCHLORIDE 1.5 MG: 2 INJECTION, SOLUTION INTRAMUSCULAR; INTRAVENOUS; SUBCUTANEOUS at 16:11

## 2022-01-01 RX ADMIN — ROCURONIUM BROMIDE 30 MG: 10 INJECTION, SOLUTION INTRAVENOUS at 16:00

## 2022-01-01 RX ADMIN — DEXAMETHASONE SODIUM PHOSPHATE 10 MG: 4 INJECTION, SOLUTION INTRAMUSCULAR; INTRAVENOUS at 15:50

## 2022-01-01 ASSESSMENT — PULMONARY FUNCTION TESTS
PIF_VALUE: 1
PIF_VALUE: 19
PIF_VALUE: 21
PIF_VALUE: 20
PIF_VALUE: 0
PIF_VALUE: 3
PIF_VALUE: 19
PIF_VALUE: 20
PIF_VALUE: 3
PIF_VALUE: 20
PIF_VALUE: 22
PIF_VALUE: 21
PIF_VALUE: 0
PIF_VALUE: 20
PIF_VALUE: 19
PIF_VALUE: 0
PIF_VALUE: 20
PIF_VALUE: 19
PIF_VALUE: 3
PIF_VALUE: 20
PIF_VALUE: 25
PIF_VALUE: 23
PIF_VALUE: 20
PIF_VALUE: 20
PIF_VALUE: 21
PIF_VALUE: 0
PIF_VALUE: 22
PIF_VALUE: 0
PIF_VALUE: 25
PIF_VALUE: 19
PIF_VALUE: 4
PIF_VALUE: 16
PIF_VALUE: 19
PIF_VALUE: 20
PIF_VALUE: 0
PIF_VALUE: 25
PIF_VALUE: 20
PIF_VALUE: 1
PIF_VALUE: 0
PIF_VALUE: 0
PIF_VALUE: 21
PIF_VALUE: 19
PIF_VALUE: 17
PIF_VALUE: 19
PIF_VALUE: 9
PIF_VALUE: 20
PIF_VALUE: 21
PIF_VALUE: 24
PIF_VALUE: 17
PIF_VALUE: 19
PIF_VALUE: 22
PIF_VALUE: 1
PIF_VALUE: 3
PIF_VALUE: 20
PIF_VALUE: 0
PIF_VALUE: 20
PIF_VALUE: 21
PIF_VALUE: 0
PIF_VALUE: 20
PIF_VALUE: 14
PIF_VALUE: 0
PIF_VALUE: 0
PIF_VALUE: 20
PIF_VALUE: 19
PIF_VALUE: 0
PIF_VALUE: 20
PIF_VALUE: 18
PIF_VALUE: 20
PIF_VALUE: 24
PIF_VALUE: 3
PIF_VALUE: 0
PIF_VALUE: 21
PIF_VALUE: 18
PIF_VALUE: 16
PIF_VALUE: 24
PIF_VALUE: 3
PIF_VALUE: 0
PIF_VALUE: 18
PIF_VALUE: 1
PIF_VALUE: 1
PIF_VALUE: 25
PIF_VALUE: 20
PIF_VALUE: 24
PIF_VALUE: 23
PIF_VALUE: 3
PIF_VALUE: 23
PIF_VALUE: 20
PIF_VALUE: 19
PIF_VALUE: 3
PIF_VALUE: 0
PIF_VALUE: 17
PIF_VALUE: 15
PIF_VALUE: 20
PIF_VALUE: 19
PIF_VALUE: 20
PIF_VALUE: 14
PIF_VALUE: 20
PIF_VALUE: 19
PIF_VALUE: 22
PIF_VALUE: 19
PIF_VALUE: 3
PIF_VALUE: 1
PIF_VALUE: 18
PIF_VALUE: 20
PIF_VALUE: 1
PIF_VALUE: 0
PIF_VALUE: 1
PIF_VALUE: 20
PIF_VALUE: 20
PIF_VALUE: 19
PIF_VALUE: 19
PIF_VALUE: 20
PIF_VALUE: 22
PIF_VALUE: 21
PIF_VALUE: 0
PIF_VALUE: 0
PIF_VALUE: 3

## 2022-01-01 ASSESSMENT — PAIN DESCRIPTION - ORIENTATION
ORIENTATION: RIGHT

## 2022-01-01 ASSESSMENT — PAIN SCALES - GENERAL
PAINLEVEL_OUTOF10: 7
PAINLEVEL_OUTOF10: 7
PAINLEVEL_OUTOF10: 0
PAINLEVEL_OUTOF10: 0
PAINLEVEL_OUTOF10: 7
PAINLEVEL_OUTOF10: 9
PAINLEVEL_OUTOF10: 9
PAINLEVEL_OUTOF10: 10
PAINLEVEL_OUTOF10: 7
PAINLEVEL_OUTOF10: 10
PAINLEVEL_OUTOF10: 9
PAINLEVEL_OUTOF10: 9
PAINLEVEL_OUTOF10: 0
PAINLEVEL_OUTOF10: 10
PAINLEVEL_OUTOF10: 0
PAINLEVEL_OUTOF10: 8
PAINLEVEL_OUTOF10: 7
PAINLEVEL_OUTOF10: 6
PAINLEVEL_OUTOF10: 7
PAINLEVEL_OUTOF10: 9
PAINLEVEL_OUTOF10: 7
PAINLEVEL_OUTOF10: 10
PAINLEVEL_OUTOF10: 10
PAINLEVEL_OUTOF10: 7
PAINLEVEL_OUTOF10: 9

## 2022-01-01 ASSESSMENT — ENCOUNTER SYMPTOMS
COLOR CHANGE: 1
SHORTNESS OF BREATH: 0
RHINORRHEA: 0
NAUSEA: 1
ABDOMINAL PAIN: 1
WHEEZING: 1
EYES NEGATIVE: 1
DIARRHEA: 0
VOMITING: 0
COUGH: 0

## 2022-01-01 ASSESSMENT — PAIN DESCRIPTION - FREQUENCY
FREQUENCY: CONTINUOUS

## 2022-01-01 ASSESSMENT — PAIN DESCRIPTION - LOCATION
LOCATION: ABDOMEN
LOCATION: BACK
LOCATION: ABDOMEN

## 2022-01-01 ASSESSMENT — PAIN DESCRIPTION - PAIN TYPE
TYPE: SURGICAL PAIN
TYPE: ACUTE PAIN

## 2022-01-01 ASSESSMENT — PAIN DESCRIPTION - ONSET
ONSET: ON-GOING

## 2022-01-01 ASSESSMENT — PAIN DESCRIPTION - DESCRIPTORS
DESCRIPTORS: ACHING

## 2022-01-01 ASSESSMENT — LIFESTYLE VARIABLES: SMOKING_STATUS: 1

## 2022-01-01 ASSESSMENT — PAIN DESCRIPTION - PROGRESSION
CLINICAL_PROGRESSION: NOT CHANGED

## 2022-01-23 PROBLEM — K83.1 OBSTRUCTIVE JAUNDICE: Status: ACTIVE | Noted: 2022-01-01

## 2022-01-23 NOTE — ED PROVIDER NOTES
[address] ENCOUNTER        Pt Name: Martin Jaramillo  MRN: [id number]  [place] 1945  Date of evaluation: 1/23/2022  Provider: David Julio PA-C  PCP: Janes Thibodeaux MD  Note Started: 6:45 PM EST       KIMBERLEY. I have evaluated this patient. My supervising physician was available for consultation. CHIEF COMPLAINT       Chief Complaint   Patient presents with    Abnormal Lab     Patient in by squad from home with elevated liver enzymes and abd pain. HISTORY OF PRESENT ILLNESS   (Location, Timing/Onset, Context/Setting, Quality, Duration, Modifying Factors, Severity, Associated Signs and Symptoms)  Note limiting factors. Chief Complaint: Abnormal lab    Martin Jaramillo is a 77 y.o. female who presents to the emergency department today for evaluation for an abnormal laboratory test.  She was sent in for an abnormal laboratory test including elevated liver enzymes. The patient tells me that she always has abdominal pain, cramping however she has had a decrease in appetite since Friday and she states that she is noticing increasing cramping to her upper abdomen which has been ongoing since Friday as well. The patient states that her pain seems to wax and wane on its own, and she otherwise denies any known alleviating or aggravating factors. Patient states she is nauseous and has not had much of an appetite although denies any vomiting or diarrhea. The patient denies any fever or chills. She is no cough or congestion. She denies any chest pain or shortness of breath. The patient states that she fell yesterday, and she states that her wheelchair slipped from behind her, and when lifting her up back into the chair she noticed some increasing pain to her ribs. Patient wears 2 L of oxygen as needed but seems to have been needing it more over the past couple of days. Denies any alcohol use.   She does take Norco for chronic pain, she otherwise has no complaints. She did not hit her head. No loss of consciousness. No vomiting    Nursing Notes were all reviewed and agreed with or any disagreements were addressed in the HPI. REVIEW OF SYSTEMS    (2-9 systems for level 4, 10 or more for level 5)     Review of Systems   Constitutional: Negative for activity change, appetite change, chills and fever. HENT: Negative for congestion and rhinorrhea. Respiratory: Negative for cough and shortness of breath. Cardiovascular: Negative for chest pain. Gastrointestinal: Positive for abdominal pain and nausea. Negative for diarrhea and vomiting. Genitourinary: Negative for difficulty urinating, dysuria and hematuria. Positives and Pertinent negatives as per HPI. Except as noted above in the ROS, all other systems were reviewed and negative. PAST MEDICAL HISTORY     Past Medical History:   Diagnosis Date    Aortic aneurysm [facility])     monitored by Dr. Leni Dames Buerger's disease [facility])     Cancer of vulva ([facility] 2008    microscopic invasive squamous carcinoma vulva    Cataract     COPD (chronic obstructive pulmonary disease) ([facility]     Emphysema (subcutaneous) (surgical) resulting from a procedure     History of radiation therapy      Completed external beam radiation therapy 04/23/14 total 5000 cGy to the right breast.     Hypertension     Lung bullae ([facility] [address] [facility]     monitored by Dr. Denver Cong vascular dis          SURGICAL HISTORY     Past Surgical History:   Procedure Laterality Date    BREAST LUMPECTOMY Right 2/6/14    BREAST SURGERY      tumors removed    BRONCHOSCOPY  01/25/2018    BAL    EYE SURGERY      lasik    JOINT REPLACEMENT Bilateral     right and left KNEE    KNEE ARTHROSCOPY      KNEE SURGERY  9/1/10    REMOVAL OF RETAINED ANTIBIOTIC SPACERS,LEFT KNEE DEBRIDEMENT ANSD SYNOVECTOMY WITH FROZEN SECTION.  LEFT KNEE PLACEMENT OF ANTIBIOTIC SPACER LEFT KNEE    OTHER SURGICAL HISTORY  3-2010    subclavian stent left    OTHER SURGICAL HISTORY      stents bilateral femoral arteries    OTHER SURGICAL HISTORY Left 2/16/15    excision of left vulva lesion    WI THORSC DX LUNGS/PERICAR/MED/PLEURAL SPACE W/O BX Left 11/2/2018    LEFT VIDEO ASSISTED THORACOSCOPY, WEDGE RESECTION, MEDIASTINAL LYMPH NODE DISSECTION performed by Emilie Whitmore MD at [address]      stent each groin    VULVECTOMY      History of radical vulvectomy with a left inguinal lymph node dissection November 2008. CURRENTMEDICATIONS       Previous Medications    ALBUTEROL SULFATE  (90 BASE) MCG/ACT INHALER    Inhale 2 puffs into the lungs every 6 hours as needed for Wheezing    AMLODIPINE (NORVASC) 5 MG TABLET    Take 5 mg by mouth daily    BISACODYL (DULCOLAX) 5 MG EC TABLET    Take 5 mg by mouth nightly as needed for Constipation. DULOXETINE (CYMBALTA) 30 MG EXTENDED RELEASE CAPSULE    Take 30 mg by mouth daily    EXEMESTANE (AROMASIN) 25 MG CHEMO TABLET    TAKE 1 TABLET BY MOUTH ONCE A DAY FOR BREAST CANCER    FLUTICASONE (FLONASE) 50 MCG/ACT NASAL SPRAY    2 sprays by Nasal route daily. FLUTICASONE-SALMETEROL (ADVAIR HFA) 230-21 MCG/ACT INHALER    INHALE 2 PUFFS BY MOUTH TWICE DAILY    GUAIFENESIN (MUCINEX) 600 MG SR TABLET    Take 800 mg by mouth 3 times daily. HYDROCODONE-ACETAMINOPHEN (NORCO)  MG PER TABLET    Take 1 tablet by mouth every 6 hours as needed for Pain. IBUPROFEN (ADVIL;MOTRIN) 400 MG TABLET    Take 1 tablet by mouth every 6 hours as needed for Pain (incisional)    INCRUSE ELLIPTA 62.5 MCG/INH AEPB    INHALE 1 PUFF BY MOUTH DAILY AS DIRECTED    LEVALBUTEROL (XOPENEX) 0.63 MG/3ML NEBULIZATION    TAKE 3 MLS BY MOUTH VIA NEBULIZER EVERY 4 HOURS AS NEEDED FOR WHEEZING    LEVOTHYROXINE (SYNTHROID) 50 MCG TABLET    Take 50 mcg by mouth Daily.     LISINOPRIL (PRINIVIL;ZESTRIL) 10 MG TABLET    Take 20 mg by mouth daily     LORAZEPAM (ATIVAN) 1 MG TABLET    Take 3 mg by mouth every evening. OXYGEN    Inhale into the lungs At night prn    POTASSIUM CHLORIDE SA (K-DUR;KLOR-CON) 20 MEQ TABLET    Take 20 mEq by mouth daily     PREGABALIN (LYRICA) 150 MG CAPSULE    Take 150 mg by mouth 2 times daily. ROSUVASTATIN (CRESTOR) 10 MG TABLET    Take 20 mg by mouth daily. UMECLIDINIUM BROMIDE (INCRUSE ELLIPTA) 62.5 MCG/INH AEPB    Inhale 1 puff into the lungs daily    XARELTO 20 MG TABS TABLET    TK 1 T PO QAM         ALLERGIES     Codeine and Penicillins    FAMILYHISTORY       Family History   Problem Relation Age of Onset    Cancer Mother         cervical    Stroke Sister     Heart Disease Brother     Breast Cancer Maternal Aunt         x2          SOCIAL HISTORY       Social History     Tobacco Use    Smoking status: Current Every Day Smoker     Packs/day: 2.00     Years: 57.00     Pack years: 114.00     Types: Cigarettes    Smokeless tobacco: Never Used    Tobacco comment: started to smoke at 16 / smoked up to 2 ppd / now smoking 0.50 ppd cigarettes   Vaping Use    Vaping Use: Never used   Substance Use Topics    Alcohol use: No    Drug use: Yes     Comment: marijuana       SCREENINGS             PHYSICAL EXAM    (up to 7 for level 4, 8 or more for level 5)     ED Triage Vitals   BP Temp Temp Source Pulse Resp SpO2 Height Weight   01/23/22 1731 01/23/22 1826 01/23/22 1826 01/23/22 1733 01/23/22 1733 01/23/22 1733 -- --   131/76 96.2 °F (35.7 °C) Oral 74 21 97 %         Physical Exam  Vitals and nursing note reviewed. Constitutional:       Appearance: She is well-developed. She is not diaphoretic. HENT:      Head: Normocephalic and atraumatic. Right Ear: External ear normal.      Left Ear: External ear normal.      Nose: Nose normal.   Eyes:      General: Scleral icterus present. Right eye: No discharge. Left eye: No discharge. Neck:      Trachea: No tracheal deviation. Cardiovascular:      Rate and Rhythm: Normal rate and regular rhythm.    Pulmonary: Effort: Pulmonary effort is normal. No respiratory distress. Breath sounds: No wheezing. Comments: Diminished aeration to bilateral bases  Abdominal:      General: Bowel sounds are normal. There is no distension. Tenderness: There is abdominal tenderness in the right upper quadrant and epigastric area. There is no guarding or rebound. Musculoskeletal:         General: Normal range of motion. Cervical back: Normal range of motion and neck supple. Skin:     General: Skin is warm and dry. Coloration: Skin is jaundiced. Neurological:      Mental Status: She is alert and oriented to person, place, and time. Psychiatric:         Behavior: Behavior normal.         DIAGNOSTIC RESULTS   LABS:    Labs Reviewed   CBC WITH AUTO DIFFERENTIAL - Abnormal; Notable for the following components:       Result Value    WBC 20.7 (*)     RBC 3.36 (*)     Hemoglobin 10.1 (*)     Hematocrit 31.8 (*)     RDW 18.0 (*)     MPV 12.0 (*)     Neutrophils Absolute 12.6 (*)     Lymphocytes Absolute 5.2 (*)     Monocytes Absolute 1.7 (*)     Unid. Mononu 6 (*)     Hypochromia Occasional (*)     Target Cells Occasional (*)     All other components within normal limits    Narrative:     Performed at:  [facility] [address], [place], [address]   Phone [phone]   BASIC METABOLIC PANEL - Abnormal; Notable for the following components:    Sodium 131 (*)     Chloride 97 (*)     CO2 19 (*)     Glucose 142 (*)     BUN 21 (*)     CREATININE <0.5 (*)     All other components within normal limits    Narrative:     Performed at:  [facility] [address] [address] [place]   Phone [phone]   LIPASE - Abnormal; Notable for the following components:    Lipase 62.0 (*)     All other components within normal limits    Narrative:     Performed at:  [facility] [address], [place], [address]   Phone [phone]   URINE RT REFLEX TO CULTURE - Abnormal; Notable for the following components:    Clarity, UA CLOUDY (*)     Bilirubin Urine LARGE (*)     Ketones, Urine TRACE (*)     Protein, UA 30 (*)     Nitrite, Urine POSITIVE (*)     Leukocyte Esterase, Urine SMALL (*)     All other components within normal limits    Narrative:     Performed at:  [facility] [address], [facility], [address]   Phone [phone]   PROTIME-INR - Abnormal; Notable for the following components:    Protime 16.3 (*)     INR 1.42 (*)     All other components within normal limits    Narrative:     Performed at:  [facility] [address], [facility], [facility] [address]   Phone [phone]   ACETAMINOPHEN LEVEL - Abnormal; Notable for the following components:    Acetaminophen Level 6 (*)     All other components within normal limits    Narrative:     Performed at:  [facility] [address], [facility], [facility] [employer]   Phone [phone]   BLOOD GAS, VENOUS - Abnormal; Notable for the following components:    pH, Jack 7.344 (*)     pCO2, Jack 39.2 (*)     pO2, Jack 79.9 (*)     HCO3, Venous 21.4 (*)     Base Excess, Jack -4.0 (*)     Carboxyhemoglobin 5.9 (*)     All other components within normal limits    Narrative:     Performed at:  [facility] [address], [facility], [address]   Phone [phone]   HEPATIC FUNCTION PANEL - Abnormal; Notable for the following components:    Albumin 2.8 (*)     Alkaline Phosphatase 1,780 (*)      (*)      (*)     Total Bilirubin 14.0 (*)     Bilirubin, Direct 9.6 (*)     Bilirubin, Indirect 4.4 (*)     All other components within normal limits    Narrative:     Performed at:  [facility] [address], [facility], [facility] [employer]   Phone [phone]   MICROSCOPIC URINALYSIS - Abnormal; Notable for the following components: Bacteria, UA RARE (*)     WBC, UA 10 (*)     Epithelial Cells, UA 7 (*)     All other components within normal limits    Narrative:     Performed at:  [facility]  [address], [address]   Phone [phone], [place]    Narrative:     Performed at:  [facility]  [address] [place],  [place], [address]   Phone [phone]   CULTURE, BLOOD 2   CULTURE, BLOOD 1   CULTURE, URINE   APTT    Narrative:     Performed at:  [facility]  [address],  [place], [address]   Phone [phone]   TROPONIN    Narrative:     Performed at:  [facility] [address] [place], [address]   Phone [phone]   BRAIN NATRIURETIC PEPTIDE    Narrative:     Performed at:  [facility] [address], [address]   Phone [phone]   LACTATE, SEPSIS    Narrative:     Performed at:  [facility] [address],  [place], [address]   Phone [phone]   AMMONIA    Narrative:     Performed at:  [facility] [address], [address]   Phone [phone]   BASIC METABOLIC PANEL   CBC WITH AUTO DIFFERENTIAL   HEPATIC FUNCTION PANEL   PROTIME-INR   LACTIC ACID, PLASMA   LIPASE   MAGNESIUM   PHOSPHORUS   PREALBUMIN   APTT   TRANSFERRIN   AFP TUMOR MARKER   CANCER ANTIGEN 19-9   CEA       When ordered only abnormal lab results are displayed. All other labs were within normal range or not returned as of this dictation. EKG: When ordered, EKG's are interpreted by the Emergency Department Physician in the absence of a cardiologist.  Please see their note for interpretation of EKG.     RADIOLOGY:   Non-plain film images such as CT, Ultrasound and MRI are read by the radiologist. Plain radiographic images are visualized and preliminarily interpreted by the ED Provider with the below findings:        Interpretation per the Radiologist below, if available at the time of this note:    CT ABDOMEN PELVIS W IV CONTRAST Additional Contrast? None   Final Result   1. 4.1 cm low-density mass in the pancreatic head suspicious for a primary   pancreatic neoplasm. Severe intra and extrahepatic biliary ductal dilatation   secondary to the mass. Mild gallbladder wall thickening and pericholecystic   stranding possibly reactive secondary to the common duct obstruction with   acute cholecystitis not excluded. 2. Several small low-density lesions in the liver new from 04/05/2021 with   metastases not excluded. Consider further evaluation with a liver protocol   MRI. 3. 14 mm indeterminate right adrenal nodule. This could also be further   evaluated with MRI. 4. Small ovoid lucent lesion in the right L4 vertebral body unchanged from   04/05/2021. Given the long-term stability this is likely benign. Consider   further evaluation with MRI. CT CERVICAL SPINE WO CONTRAST   Final Result   Moderate degenerative disc changes throughout the lower cervical spine with   which are unchanged with no acute abnormality seen. Status post laminectomy and interbody fusion and C7-T1 which is unchanged. There is some lucency along the pedicle screws of C7 which is could indicate   loosening of the screws which is unchanged. Recommend surgical follow-up      Moderate disc osteophyte complexes at C5-6 and C6-7 which is unchanged. CT HEAD WO CONTRAST   Final Result   Mild atrophy and mild chronic microischemic disease scattered in the deep   white matter which is unchanged with no acute abnormality seen. Chronic sinusitis which is more prominent. XR CHEST PORTABLE   Final Result   Hazy interstitial opacities throughout both lungs which is more prominent on   the left and is increased. This could represent early multisegmental   interstitial pneumonitis. Recommend follow-up      Questionable small hiatal hernia. No results found. PROCEDURES   Unless otherwise noted below, none     Procedures    CRITICAL CARE TIME   Critical Care  There was a high probability of life-threatening clinical deterioration in the patient's condition requiring my urgent intervention. Total critical care time with the patient was 45 minutes excluding separately reportable procedures. Critical care required due to patients this, intra and extrahepatic ductal dilatation resulting in hyperbilirubinemia, jaundice, new transaminitis, GI consultation, surgery consultation, antibiotics, and admission      CONSULTS:  [address] [place], [place] [address]vd and DIFFERENTIAL DIAGNOSIS/MDM:   Vitals:    Vitals:    01/23/22 2136 01/23/22 2137 01/23/22 2143 01/23/22 2158   BP:   (!) 120/90 126/73   Pulse: 91 91 94 94   Resp: 13 19 18 12   Temp:       TempSrc:       SpO2: 100% 91% 90% 94%       Patient was given the following medications:  Medications   morphine injection 4 mg (4 mg IntraVENous Given 1/23/22 2227)   cefepime (MAXIPIME) 2000 mg IVPB minibag (0 mg IntraVENous Stopped 1/23/22 2136)   iopamidol (ISOVUE-370) 76 % injection 75 mL (75 mLs IntraVENous Given 1/23/22 1947)   fentaNYL (SUBLIMAZE) injection 25 mcg (25 mcg IntraVENous Given 1/23/22 1900)   ondansetron (ZOFRAN) injection 4 mg (4 mg IntraVENous Given 1/23/22 1859)   0.9 % sodium chloride bolus (0 mLs IntraVENous Stopped 1/23/22 2136)   metronidazole (FLAGYL) 500 mg in NaCl 100 mL IVPB premix (500 mg IntraVENous New Bag 1/23/22 2136)   LORazepam (ATIVAN) injection 1 mg (1 mg IntraVENous Given 1/23/22 2227)           Briefly, this is a 78-year-old female who presents emergency department today for abnormal laboratory test.  Daughter bedside and states that the patient typically has complaints of abdominal pain. They did have lab work drawn on Thursday, was sent to the ED today.   Patient has had decreased

## 2022-01-24 NOTE — CONSULTS
Gastroenterology Consult Note        Patient: Jermaine Meyers  : 1945  Acct#:      Date:  2022    Subjective:       History of Present Illness  Patient is a 77 y.o.  female admitted with Hyperbilirubinemia [E80.6]  Obstructive jaundice [K83.1]  Transaminitis [R74.01]  Pancreatic mass [K86.89]  Jaundice, non- [R17] who is seen in consult for Pancreatic Mass. Past medical history significant for Breast Ca, HTN, DVT, wheelchair-bound, COPD on 2L nasal cannula at night. Presented to the ED yesterday w/ complaints of abdominal pain. Onset 10 days prior to admission. Located in epigastric area, rated 10/10. She was also noted to be jaundiced. Pain is worsened by eating. Denies N/V/dysphagia. She reports a fever of 100 degrees 1 day PTA. CT abd/pelvis shows 4.1 cm mass in the pancreatic head with possible mets to liver. Also showed severe intra and extra hepatic bilary ductal dilation secondary to Mass. Mild gallbladder wall thickening also appreciated. Currently she is resting in bed, states abdominal pain much improved with IV pain meds. Has been NPO since presentation. Elevated bili (14.0) alphos and transaminases.        Past Medical History:   Diagnosis Date    Aortic aneurysm [facility])     monitored by Dr. Jennifer Mitchell disease [facility])     Cancer of vulva ([facility]     microscopic invasive squamous carcinoma vulva    Cataract     COPD (chronic obstructive pulmonary disease) ([facility] [address] [facility]     Emphysema (subcutaneous) (surgical) resulting from a procedure     History of radiation therapy      Completed external beam radiation therapy 14 total 5000 cGy to the right breast.     Hypertension     Lung bullae ([facility] [address]     monitored by Dr. Kory Conley vascular dis       Past Surgical History:   Procedure Laterality Date    BREAST LUMPECTOMY Right 14    BREAST SURGERY      tumors removed    BRONCHOSCOPY  2018    BAL    EYE SURGERY      froilan Garcia JOINT REPLACEMENT Bilateral     right and left KNEE    KNEE ARTHROSCOPY      KNEE SURGERY  9/1/10    REMOVAL OF RETAINED ANTIBIOTIC SPACERS,LEFT KNEE DEBRIDEMENT ANSD SYNOVECTOMY WITH FROZEN SECTION. LEFT KNEE PLACEMENT OF ANTIBIOTIC SPACER LEFT KNEE    OTHER SURGICAL HISTORY  3-2010    subclavian stent left    OTHER SURGICAL HISTORY      stents bilateral femoral arteries    OTHER SURGICAL HISTORY Left 2/16/15    excision of left vulva lesion    IL THORSC DX LUNGS/PERICAR/MED/PLEURAL SPACE W/O BX Left 11/2/2018    LEFT VIDEO ASSISTED THORACOSCOPY, WEDGE RESECTION, MEDIASTINAL LYMPH NODE DISSECTION performed by Soledad Olivia MD at [facility]      stent each groin    VULVECTOMY      History of radical vulvectomy with a left inguinal lymph node dissection November 2008. Past Endoscopic History Unknown    Admission Meds  No current facility-administered medications on file prior to encounter. Current Outpatient Medications on File Prior to Encounter   Medication Sig Dispense Refill    albuterol sulfate  (90 Base) MCG/ACT inhaler Inhale 2 puffs into the lungs every 6 hours as needed for Wheezing 3 Inhaler 0    levalbuterol (XOPENEX) 0.63 MG/3ML nebulization TAKE 3 MLS BY MOUTH VIA NEBULIZER EVERY 4 HOURS AS NEEDED FOR WHEEZING 1080 mL 0    XARELTO 20 MG TABS tablet TK 1 T PO QAM  2    DULoxetine (CYMBALTA) 30 MG extended release capsule Take 30 mg by mouth daily      ibuprofen (ADVIL;MOTRIN) 400 MG tablet Take 1 tablet by mouth every 6 hours as needed for Pain (incisional) 90 tablet 3    amLODIPine (NORVASC) 5 MG tablet Take 5 mg by mouth daily      exemestane (AROMASIN) 25 MG chemo tablet TAKE 1 TABLET BY MOUTH ONCE A DAY FOR BREAST CANCER 90 tablet 0    OXYGEN Inhale into the lungs At night prn      HYDROcodone-acetaminophen (NORCO)  MG per tablet Take 1 tablet by mouth every 6 hours as needed for Pain.  15 tablet 0    bisacodyl (DULCOLAX) 5 MG EC tablet Take 5 mg by mouth nightly as needed for Constipation.  levothyroxine (SYNTHROID) 50 MCG tablet Take 50 mcg by mouth Daily.  guaifenesin (MUCINEX) 600 MG SR tablet Take 800 mg by mouth 3 times daily.  potassium chloride SA (K-DUR;KLOR-CON) 20 MEQ tablet Take 20 mEq by mouth daily       pregabalin (LYRICA) 150 MG capsule Take 150 mg by mouth 2 times daily.  rosuvastatin (CRESTOR) 10 MG tablet Take 20 mg by mouth daily.  lisinopril (PRINIVIL;ZESTRIL) 10 MG tablet Take 20 mg by mouth daily       lorazepam (ATIVAN) 1 MG tablet Take 3 mg by mouth every evening.  fluticasone (FLONASE) 50 MCG/ACT nasal spray 2 sprays by Nasal route daily.       fluticasone-salmeterol (ADVAIR HFA) 230-21 MCG/ACT inhaler INHALE 2 PUFFS BY MOUTH TWICE DAILY 1 Inhaler 2    Umeclidinium Bromide (INCRUSE ELLIPTA) 62.5 MCG/INH AEPB Inhale 1 puff into the lungs daily 1 each 3    INCRUSE ELLIPTA 62.5 MCG/INH AEPB INHALE 1 PUFF BY MOUTH DAILY AS DIRECTED 1 each 6         Allergies  Allergies   Allergen Reactions    Codeine Hives    Penicillins Hives      Social   Social History     Tobacco Use    Smoking status: Current Every Day Smoker     Packs/day: 2.00     Years: 57.00     Pack years: 114.00     Types: Cigarettes    Smokeless tobacco: Never Used    Tobacco comment: started to smoke at 16 / smoked up to 2 ppd / now smoking 0.50 ppd cigarettes   Substance Use Topics    Alcohol use: No        Family History   Problem Relation Age of Onset    Cancer Mother         cervical    Stroke Sister     Heart Disease Brother     Breast Cancer Maternal Aunt         x2        Review of Systems  Constitutional: negative for fevers, chills, sweats    Ears, nose, mouth, throat, and face: negative for nasal congestion and sore throat   Respiratory: negative for cough and shortness of breath   Cardiovascular: negative for chest pain and dyspnea   Gastrointestinal: see hpi   Genitourinary:negative for dysuria and frequency Integument/breast: negative for pruritus and rash   Hematologic/lymphatic: negative for bleeding and easy bruising   Musculoskeletal:negative for arthralgias and myalgias   Neurological: +for weakness         Physical Exam  Blood pressure 114/71, pulse 87, temperature 98.1 °F (36.7 °C), temperature source Oral, resp. rate 18, height 5' 5\" (1.651 m), weight 159 lb (72.1 kg), last menstrual period 03/11/1991, SpO2 93 %, not currently breastfeeding. General appearance: alert, cooperative, no distress, appears stated age  Eyes: Anicteric  Head: Normocephalic, without obvious abnormality  Lungs: Normal Effort + wjeezomg/   Heart: regular rate and rhythm, normal S1 and S2, no murmurs or rubs  Abdomen: soft,  Bowel sounds normal. No masses,  no organomegaly. Mild ttp   Extremities: atraumatic, no cyanosis or edema  Skin: warm and dry, +jaundice  Neuro: Grossly intact, A&OX3  Musculoskeletal: 5/5  strength BUE      Data Review:    Recent Labs     01/23/22  1802 01/24/22  0424   WBC 20.7* 20.6*   HGB 10.1* 9.7*   HCT 31.8* 30.5*   MCV 94.7 94.5    203     Recent Labs     01/23/22 1802 01/24/22  0424   * 135*   K 5.1 5.5*   CL 97* 103   CO2 19* 20*   PHOS  --  4.9   BUN 21* 19   CREATININE <0.5* <0.5*     Recent Labs     01/23/22 1802 01/24/22  0424   * 619*   * 522*   BILIDIR 9.6* >10.0*   BILITOT 14.0* 13.8*   ALKPHOS 1,780* 1,746*     Recent Labs     01/23/22 1802 01/24/22  0424   LIPASE 62.0* 18.0     Recent Labs     01/23/22 1802 01/24/22  0423   PROTIME 16.3* 15.6*   INR 1.42* 1.36*     No results for input(s): PTT in the last 72 hours. No results for input(s): OCCULTBLD in the last 72 hours.     Imaging Studies:                          CT-scan of abdomen and pelvis W/ IV contrast 1/23/22     Impression   1. 4.1 cm low-density mass in the pancreatic head suspicious for a primary   pancreatic neoplasm.  Severe intra and extrahepatic biliary ductal dilatation   secondary to the mass.  Mild gallbladder wall thickening and pericholecystic   stranding possibly reactive secondary to the common duct obstruction with   acute cholecystitis not excluded. 2. Several small low-density lesions in the liver new from 04/05/2021 with   metastases not excluded.  Consider further evaluation with a liver protocol   MRI. 3. 14 mm indeterminate right adrenal nodule.  This could also be further   evaluated with MRI. 4. Small ovoid lucent lesion in the right L4 vertebral body unchanged from   04/05/2021. Waylan Link the long-term stability this is likely benign.  Consider   further evaluation with MRI. Assessment:     Active Problems:    Obstructive jaundice  Resolved Problems:    * No resolved hospital problems. *    Pancreatic Mass: Per CT w/ IV contrast 4.1 CM low density Mass in the pancreatic head suspicious for primary pancreatic neoplasm. Obstructive jaundice: Secondary to above, severe intra and extrahepatic biliary ductal dilation. Bili 14 Alkphos 1700    COPD: On 2L NC at home while sleep. Wheezing noted today, no SOB. Rapid covid neg 1/23/22    Recommendations:   - NPO  - Plan EUS & ERCP for decompression today with Dr. Ondina Toth. - Monitor LFTs  - Await AFP, CA 19-9    Discussed with Dr. Lynne Michael, [address] [place] [address]    I have personally performed a face to face diagnostic evaluation on this patient. I have interviewed and examined the patient and I agree with the findings and recommended plan of care. In summary, my findings and plan are the following:  Pt with jaundice and CT demonstrates 4cm mass in pancreatic head with biliary obstruction. There are new areas in the liver that are also concerning for metastatic lesions. Pt is on xarelto. Mild upper ab pain, mild ttp. No r/g.  + icteric and jaundiced. Will plan on EUS for diagnosis and ERCP for biliary decompression. Case d/w Dr. Ondina Toth and will plan for procedure today.     Will need

## 2022-01-24 NOTE — PROGRESS NOTES
Speech Language Pathology  HOLD    Shyanne Ortiz  1945      SLP eval and treat orders received. Per discussion with RN, pt is currently NPO for a procedure this date and unable to participate in dysphagia evaluation. Will re-attempt evaluation as pt is able and as schedule allows.      Thank you,     Kj Stewart M.A., [address] [place]  Speech-Language Pathologist  1/24/2022 8:55 AM

## 2022-01-24 NOTE — CONSULTS
[facility] GENERAL AND LAPAROSCOPIC SURGERY                       PATIENT NAME: Parveen Gipson     ADMISSION DATE: 1/23/2022  5:26 PM      TODAY'S DATE: 1/24/2022    Reason for Consult:  Pancreas mass    Requesting Physician:  LIZANDRO Tyson    HISTORY OF PRESENT ILLNESS:              The patient is a 77 y.o. female who presents with jaundice, and a pancreatic mass, HOP, approx 4 cm size. Pt seen in ER with abd pain and admitted, central, mid abd pain, moderate with pressure, worse after eating. No F/C. Has felt jaundiced and had a decreased appetite. No prior intraabd / gastric / GB surgery. Has COPD, intermittent O2 use, and hx of breast cancer, HTN, and Aortic aneurysm. Past Medical History:        Diagnosis Date    Aortic aneurysm [facility])     monitored by Dr. Abel Paredes disease [facility])     Cancer of vulva ([facility] 2008    microscopic invasive squamous carcinoma vulva    Cataract     COPD (chronic obstructive pulmonary disease) ([facility]     Emphysema (subcutaneous) (surgical) resulting from a procedure     History of radiation therapy      Completed external beam radiation therapy 04/23/14 total 5000 cGy to the right breast.     Hypertension     Lung bullae ([facility] [address]     monitored by Dr. Daxa Paez vascular dis        Past Surgical History:        Procedure Laterality Date    BREAST LUMPECTOMY Right 2/6/14    BREAST SURGERY      tumors removed    BRONCHOSCOPY  01/25/2018    BAL    EYE SURGERY      lasik    JOINT REPLACEMENT Bilateral     right and left KNEE    KNEE ARTHROSCOPY      KNEE SURGERY  9/1/10    REMOVAL OF RETAINED ANTIBIOTIC SPACERS,LEFT KNEE DEBRIDEMENT ANSD SYNOVECTOMY WITH FROZEN SECTION.  LEFT KNEE PLACEMENT OF ANTIBIOTIC SPACER LEFT KNEE    OTHER SURGICAL HISTORY  3-2010    subclavian stent left    OTHER SURGICAL HISTORY      stents bilateral femoral arteries    OTHER SURGICAL HISTORY Left 2/16/15    excision of left vulva lesion    AK THORSC DX LUNGS/PERICAR/MED/PLEURAL SPACE W/O BX Left 11/2/2018    LEFT VIDEO ASSISTED THORACOSCOPY, WEDGE RESECTION, MEDIASTINAL LYMPH NODE DISSECTION performed by Aure Stein MD at [address] [place] [address] each groin    VULVECTOMY      History of radical vulvectomy with a left inguinal lymph node dissection November 2008.        Current Medications:   Current Facility-Administered Medications: glucose (GLUTOSE) 40 % oral gel 15 g, 15 g, Oral, PRN  dextrose 50 % IV solution, 12.5 g, IntraVENous, PRN  glucagon (rDNA) injection 1 mg, 1 mg, IntraMUSCular, PRN  dextrose 5 % solution, 100 mL/hr, IntraVENous, PRN  ipratropium-albuterol (DUONEB) nebulizer solution 1 ampule, 1 ampule, Inhalation, Q4H WA  cefepime (MAXIPIME) 2000 mg IVPB minibag, 2,000 mg, IntraVENous, Q12H  albuterol sulfate  (90 Base) MCG/ACT inhaler 2 puff, 2 puff, Inhalation, Q6H PRN  amLODIPine (NORVASC) tablet 5 mg, 5 mg, Oral, Daily  bisacodyl (DULCOLAX) EC tablet 5 mg, 5 mg, Oral, Nightly PRN  DULoxetine (CYMBALTA) extended release capsule 30 mg, 30 mg, Oral, Daily  budesonide-formoterol (SYMBICORT) 160-4.5 MCG/ACT inhaler 2 puff, 2 puff, Inhalation, BID  levalbuterol (XOPENEX) nebulization 0.63 mg, 0.63 mg, Nebulization, Q6H PRN  levothyroxine (SYNTHROID) tablet 50 mcg, 50 mcg, Oral, Daily  pregabalin (LYRICA) capsule 150 mg, 150 mg, Oral, BID  sodium chloride flush 0.9 % injection 5-40 mL, 5-40 mL, IntraVENous, 2 times per day  sodium chloride flush 0.9 % injection 5-40 mL, 5-40 mL, IntraVENous, PRN  0.9 % sodium chloride infusion, 25 mL, IntraVENous, PRN  ondansetron (ZOFRAN-ODT) disintegrating tablet 4 mg, 4 mg, Oral, Q8H PRN **OR** ondansetron (ZOFRAN) injection 4 mg, 4 mg, IntraVENous, Q6H PRN  polyethylene glycol (GLYCOLAX) packet 17 g, 17 g, Oral, Daily PRN  acetaminophen (TYLENOL) tablet 650 mg, 650 mg, Oral, Q6H PRN **OR** acetaminophen (TYLENOL) suppository 650 mg, 650 mg, Rectal, Q6H PRN  nicotine (NICODERM CQ) 21 MG/24HR 1 patch, 1 patch, TransDERmal, Daily  0.9 % sodium chloride infusion, , IntraVENous, Continuous  morphine (PF) injection 2 mg, 2 mg, IntraVENous, Q3H PRN **OR** morphine injection 4 mg, 4 mg, IntraVENous, Q3H PRN  LORazepam (ATIVAN) tablet 1 mg, 1 mg, Oral, Nightly  metronidazole (FLAGYL) 500 mg in NaCl 100 mL IVPB premix, 500 mg, IntraVENous, Q8H  Prior to Admission medications    Medication Sig Start Date End Date Taking? Authorizing Provider   albuterol sulfate  (90 Base) MCG/ACT inhaler Inhale 2 puffs into the lungs every 6 hours as needed for Wheezing 2/1/21  Yes Danny Marcial PA-C   levalbuterol (XOPENEX) 0.63 MG/3ML nebulization TAKE 3 MLS BY MOUTH VIA NEBULIZER EVERY 4 HOURS AS NEEDED FOR WHEEZING 12/23/19  Yes Ezequiel Tobin MD   XARELTO 20 MG TABS tablet TK 1 T PO QAM 9/15/19  Yes Historical Provider, MD   DULoxetine (CYMBALTA) 30 MG extended release capsule Take 30 mg by mouth daily   Yes Historical Provider, MD   ibuprofen (ADVIL;MOTRIN) 400 MG tablet Take 1 tablet by mouth every 6 hours as needed for Pain (incisional) 11/6/18  Yes SUSIE Aviles - CNP   amLODIPine (NORVASC) 5 MG tablet Take 5 mg by mouth daily   Yes Historical Provider, MD   exemestane (AROMASIN) 25 MG chemo tablet TAKE 1 TABLET BY MOUTH ONCE A DAY FOR BREAST CANCER 5/12/17  Yes Lukasz Jones MD   OXYGEN Inhale into the lungs At night prn   Yes Historical Provider, MD   HYDROcodone-acetaminophen (NORCO)  MG per tablet Take 1 tablet by mouth every 6 hours as needed for Pain. 2/16/15  Yes Lindsay Burt MD   bisacodyl (DULCOLAX) 5 MG EC tablet Take 5 mg by mouth nightly as needed for Constipation. Yes Historical Provider, MD   levothyroxine (SYNTHROID) 50 MCG tablet Take 50 mcg by mouth Daily. Yes Historical Provider, MD   guaifenesin (MUCINEX) 600 MG SR tablet Take 800 mg by mouth 3 times daily.    Yes Historical Provider, MD   potassium chloride SA (K-DUR;KLOR-CON) 20 MEQ tablet Take 20 mEq by mouth daily    Yes Historical Provider, MD   pregabalin (LYRICA) 150 MG capsule Take 150 mg by mouth 2 times daily. Yes Historical Provider, MD   rosuvastatin (CRESTOR) 10 MG tablet Take 20 mg by mouth daily. Yes Historical Provider, MD   lisinopril (PRINIVIL;ZESTRIL) 10 MG tablet Take 20 mg by mouth daily    Yes Historical Provider, MD   lorazepam (ATIVAN) 1 MG tablet Take 3 mg by mouth every evening. 3/8/10  Yes Historical Provider, MD   fluticasone (FLONASE) 50 MCG/ACT nasal spray 2 sprays by Nasal route daily. Yes Historical Provider, MD   fluticasone-salmeterol (ADVAIR HFA) 230-21 MCG/ACT inhaler INHALE 2 PUFFS BY MOUTH TWICE DAILY 12/18/19   Devin Elder MD   Umeclidinium Bromide (INCRUSE ELLIPTA) 62.5 MCG/INH AEPB Inhale 1 puff into the lungs daily 10/15/19   Devin Elder MD   INCRUSE ELLIPTA 62.5 MCG/INH AEPB INHALE 1 PUFF BY MOUTH DAILY AS DIRECTED 2/11/19   Oni Goins MD        Allergies:  Codeine and Penicillins    Social History:    reports that she has been smoking cigarettes. She has a 114.00 pack-year smoking history. She has never used smokeless tobacco. She reports current drug use. She reports that she does not drink alcohol.     Family History:        Problem Relation Age of Onset    Cancer Mother         cervical    Stroke Sister     Heart Disease Brother     Breast Cancer Maternal Aunt         x2       REVIEW OF SYSTEMS:  CONSTITUTIONAL:  positive for  fatigue and malaise  HEENT:  negative  RESPIRATORY:  negative  CARDIOVASCULAR:  negative  GASTROINTESTINAL:  negative except for nausea, abdominal pain and jaundice  GENITOURINARY:  negative  HEMATOLOGIC/LYMPHATIC:  negative  NEUROLOGICAL:  negative  SKIN: negative    PHYSICAL EXAM:  VITALS:  /67   Pulse 93   Temp 99.3 °F (37.4 °C) (Oral)   Resp 16   Ht 5' 5\" (1.651 m)   Wt 159 lb (72.1 kg)   LMP 03/11/1991 (Approximate) Comment: menarche 7years old  SpO2 90%   BMI 26.46 kg/m²   24HR INTAKE/OUTPUT:      Intake/Output Summary (Last 24 hours) at 1/24/2022 1219  Last data filed at 1/24/2022 0054  Gross per 24 hour   Intake 100 ml   Output --   Net 100 ml     DRAIN/TUBE OUTPUT:     CONSTITUTIONAL:  alert, mild distress and normal weight  EYES:  sclera clear  ENT:  normocepalic, without obvious abnormality  NECK:  supple, symmetrical, trachea midline and no mass  LUNGS:  clear to auscultation  CARDIOVASCULAR:  regular rate and rhythm and no murmur noted  ABDOMEN:  no scars, normal bowel sounds, soft, non-distended, mild diffuse tenderness noted, voluntary guarding absent, no masses palpated, no hepatosplenomegally and hernia absent  MUSCULOSKELETAL:  0+ pitting edema lower extremities  NEUROLOGIC:  Mental Status Exam:  Level of Alertness:   awake  Orientation:   person, place, time  SKIN:  no bruising or bleeding, normal skin color, texture, turgor and no redness, warmth, or swelling    DATA:    CBC:   Recent Labs     01/23/22  1802 01/24/22 0424   WBC 20.7* 20.6*   HGB 10.1* 9.7*   HCT 31.8* 30.5*    203     BMP:    Recent Labs     01/23/22  1802 01/24/22  0424 01/24/22  1003   * 135* 137   K 5.1 5.5* 5.1   CL 97* 103 104   CO2 19* 20* 20*   BUN 21* 19 20   CREATININE <0.5* <0.5* 0.6   GLUCOSE 142* 135* 95     Hepatic:   Recent Labs     01/23/22  1802 01/24/22  0424   * 619*   * 522*   BILITOT 14.0* 13.8*   ALKPHOS 1,780* 1,746*     Mag:    No results for input(s): MG in the last 72 hours. Phos:     Recent Labs     01/24/22  0424 01/24/22  1003   PHOS 4.9 4.3      INR:   Recent Labs     01/23/22  1802 01/24/22 0423   INR 1.42* 1.36*     LIPASE:   Recent Labs     01/23/22  1802 01/24/22 0424   LIPASE 62.0* 18.0      AMYLASE: No results for input(s): AMYLASE in the last 72 hours.      Radiology Review:     CT HEAD WO CONTRAST    Result Date: 1/23/2022  EXAMINATION: CT OF THE HEAD WITHOUT CONTRAST  1/23/2022 7:40 pm TECHNIQUE: CT of the head was performed without the administration of intravenous contrast. Dose modulation, iterative reconstruction, and/or weight based adjustment of the mA/kV was utilized to reduce the radiation dose to as low as reasonably achievable. COMPARISON: None. HISTORY: ORDERING SYSTEM PROVIDED HISTORY: fall TECHNOLOGIST PROVIDED HISTORY: Reason for exam:->fall Has a \"code stroke\" or \"stroke alert\" been called? ->No Decision Support Exception - unselect if not a suspected or confirmed emergency medical condition->Emergency Medical Condition (MA) Reason for Exam: Abnormal Lab (Patient in by squad from home with elevated liver enzymes and abd pain. ) FINDINGS: BRAIN/VENTRICLES: The ventricles are borderline enlarged and there is diffuse mild prominence of the cortical sulci. There is mild periventricular low density bilaterally. No intracranial hemorrhage or edema is seen. There is no extra-axial fluid collection or mass. ORBITS: The visualized portion of the orbits demonstrate no acute abnormality. SINUSES: There is moderate mucosal thickening throughout the ethmoid and maxillary sinuses. The mastoid air cells demonstrate no acute abnormality. SOFT TISSUES/SKULL:  No acute abnormality of the visualized skull or soft tissues. Mild atrophy and mild chronic microischemic disease scattered in the deep white matter which is unchanged with no acute abnormality seen. Chronic sinusitis which is more prominent. CT CERVICAL SPINE WO CONTRAST    Result Date: 1/23/2022  EXAMINATION: CT OF THE CERVICAL SPINE WITHOUT CONTRAST 1/23/2022 7:40 pm TECHNIQUE: CT of the cervical spine was performed without the administration of intravenous contrast. Multiplanar reformatted images are provided for review. Dose modulation, iterative reconstruction, and/or weight based adjustment of the mA/kV was utilized to reduce the radiation dose to as low as reasonably achievable.  COMPARISON: 04/05/2021 HISTORY: ORDERING SYSTEM PROVIDED HISTORY: fall TECHNOLOGIST PROVIDED HISTORY: Reason for exam:->fall Decision Support Exception - unselect if not a suspected or confirmed emergency medical condition->Emergency Medical Condition (MA) Reason for Exam: Abnormal Lab (Patient in by squad from home with elevated liver enzymes and abd pain. ) FINDINGS: BONES/ALIGNMENT: There is moderately severe disc space narrowing throughout the lower cervical spine with prominent osteophytes throughout which is unchanged. There is minimal subluxation of C4 on C5 which is unchanged. There is mild subluxation of C7 on T1 which is unchanged. There are pedicle screws extending through the C7 and T1 vertebra which are held in place with surgical rods and is unchanged. No fracture is seen. The atlantoaxial joint is intact. There are subchondral cystic or erosive changes along the base of the odontoid process posteriorly which is unchanged. DEGENERATIVE CHANGES: There is mild lucency around the pedicle screw of C7 which is unchanged. There are moderate disc osteophyte complexes at C5-6 and C6-7 causing moderate dural sac effacement. There are laminectomy defects of C7 and T1 which is unchanged. There is moderate facet hypertrophy and sclerosis throughout. SOFT TISSUES: There is no prevertebral soft tissue swelling. There are some hazy reticulonodular opacities along both upper lobes. Moderate degenerative disc changes throughout the lower cervical spine with which are unchanged with no acute abnormality seen. Status post laminectomy and interbody fusion and C7-T1 which is unchanged. There is some lucency along the pedicle screws of C7 which is could indicate loosening of the screws which is unchanged. Recommend surgical follow-up Moderate disc osteophyte complexes at C5-6 and C6-7 which is unchanged.      CT ABDOMEN PELVIS W IV CONTRAST Additional Contrast? None    Result Date: 1/23/2022  EXAMINATION: CT OF THE ABDOMEN AND PELVIS WITH CONTRAST 1/23/2022 7:41 pm TECHNIQUE: CT of the abdomen and pelvis was performed with the administration of intravenous contrast. Multiplanar reformatted images are provided for review. Dose modulation, iterative reconstruction, and/or weight based adjustment of the mA/kV was utilized to reduce the radiation dose to as low as reasonably achievable. COMPARISON: CT chest angiogram 09/07/2021. CT chest, abdomen, and pelvis 04/05/2021. HISTORY: ORDERING SYSTEM PROVIDED HISTORY: juancice r/o choledocolithiasis TECHNOLOGIST PROVIDED HISTORY: Reason for exam:->juancice r/o choledocolithiasis Additional Contrast?->None Decision Support Exception - unselect if not a suspected or confirmed emergency medical condition->Emergency Medical Condition (MA) Reason for Exam: Abnormal Lab (Patient in by squad from home with elevated liver enzymes and abd pain. ) FINDINGS: Lower Chest: Chronic ground-glass opacities in the lung bases not significantly changed from 09/07/2021. COPD. Severe atherosclerosis of the distal descending thoracic aorta with a chronic 5.5 cm fusiform aneurysm as measured on coronal image 74 not significantly changed. Organs: Severe diffuse intrahepatic biliary ductal dilatation. The common duct is distended measuring up to approximately 2.5 cm on coronal image 65. There is abrupt narrowing in the head of the pancreas where there appears to be a low-density mass measuring approximately 4.1 x 3.2 cm on axial image 91 new from 04/05/2021.  8 mm ovoid nonspecific low-density lesion in the left hepatic lobe on axial image 46 new from 04/05/2021. Similar appearing 11 mm low-density lesion in the right hepatic lobe on axial image 73 also new. 8 mm low-density lesion in the right hepatic lobe on axial image 52. Sludge versus small gallstones. Mild gallbladder wall thickening and pericholecystic stranding. No pancreatic ductal dilatation. The body and tail are normal in appearance. The spleen and left adrenal gland are unremarkable.   14 mm indeterminate right adrenal nodule new or mildly increased in size from 04/05/2021. Chronic cortical scarring in the midpole of the right kidney. Small bilateral nonobstructing renal calculi. No obstructive uropathy. GI/Bowel: Colonic diverticulosis without evidence of acute diverticulitis. Normal appendix. No obstruction or wall thickening identified. Pelvis: The urinary bladder is normal in appearance. The uterus and bilateral adnexae are unremarkable. No free fluid. No pelvic or inguinal lymphadenopathy. Peritoneum/Retroperitoneum: The abdominal aorta is normal in caliber with moderate to severe atherosclerosis. Bilateral iliac stents are in place. No retroperitoneal or mesenteric lymphadenopathy is identified. No free air or fluid is seen in the abdomen. The splenic and portal veins are patent. The hepatic veins are grossly patent. The inferior vena cava and bilateral renal veins are patent. Bones/Soft Tissues: Ovoid lucent lesion in the right L4 vertebral body unchanged from 04/05/2021. No acute osseous or soft tissue abnormality. 1. 4.1 cm low-density mass in the pancreatic head suspicious for a primary pancreatic neoplasm. Severe intra and extrahepatic biliary ductal dilatation secondary to the mass. Mild gallbladder wall thickening and pericholecystic stranding possibly reactive secondary to the common duct obstruction with acute cholecystitis not excluded. 2. Several small low-density lesions in the liver new from 04/05/2021 with metastases not excluded. Consider further evaluation with a liver protocol MRI. 3. 14 mm indeterminate right adrenal nodule. This could also be further evaluated with MRI. 4. Small ovoid lucent lesion in the right L4 vertebral body unchanged from 04/05/2021. Given the long-term stability this is likely benign. Consider further evaluation with MRI. XR CHEST PORTABLE    Result Date: 1/23/2022  EXAMINATION: ONE XRAY VIEW OF THE CHEST 1/23/2022 5:46 pm COMPARISON: None.  HISTORY: ORDERING SYSTEM PROVIDED HISTORY: abd pian TECHNOLOGIST PROVIDED HISTORY: Reason for exam:->simón lao Reason for Exam: abd pain FINDINGS: The heart is normal.  The pulmonary vessels are engorged centrally. There are some hazy interstitial opacities throughout both lungs which is more prominent on the left. No effusion is seen. There is a questionable small hiatal hernia along the lower mediastinum. The aorta is ectatic. Hazy interstitial opacities throughout both lungs which is more prominent on the left and is increased. This could represent early multisegmental interstitial pneumonitis. Recommend follow-up Questionable small hiatal hernia.        IMPRESSION/RECOMMENDATIONS:    Pancreatic mass, with obstructive jaundice and pain    Pt with concern of mets to liver with lever lesions X 2 noted on CT  Labs / tumor markers pending  EUS / ERCP today for dx, decompressive stent, eval for local resectability  Additional liver eval and CT of Chest will also be needed to eval for metastatic disease  See to what degree mass is obstructing in duodenum with endoscopy today    Will follow, +/- any surgery plans pending results of work up    Thank you,     Lamin Pearce MD

## 2022-01-24 NOTE — PROGRESS NOTES
[facility]    1/24/2022 11:08 AM        Name: Maribel Li . Admitted: 1/23/2022  Primary Care Provider: Dieter Chowdhury MD (Tel: [phone])                        Subjective:  . No acute events overnight. Resting well. Pain control. Diet ok. Labs reviewed  Denies any chest pain sob.      Reviewed interval ancillary notes    Current Medications  glucose (GLUTOSE) 40 % oral gel 15 g, PRN  dextrose 50 % IV solution, PRN  glucagon (rDNA) injection 1 mg, PRN  dextrose 5 % solution, PRN  cefepime (MAXIPIME) 2000 mg IVPB minibag, Q12H  albuterol sulfate  (90 Base) MCG/ACT inhaler 2 puff, Q6H PRN  amLODIPine (NORVASC) tablet 5 mg, Daily  bisacodyl (DULCOLAX) EC tablet 5 mg, Nightly PRN  DULoxetine (CYMBALTA) extended release capsule 30 mg, Daily  budesonide-formoterol (SYMBICORT) 160-4.5 MCG/ACT inhaler 2 puff, BID  levalbuterol (XOPENEX) nebulization 0.63 mg, Q6H PRN  levothyroxine (SYNTHROID) tablet 50 mcg, Daily  pregabalin (LYRICA) capsule 150 mg, BID  sodium chloride flush 0.9 % injection 5-40 mL, 2 times per day  sodium chloride flush 0.9 % injection 5-40 mL, PRN  0.9 % sodium chloride infusion, PRN  ondansetron (ZOFRAN-ODT) disintegrating tablet 4 mg, Q8H PRN   Or  ondansetron (ZOFRAN) injection 4 mg, Q6H PRN  polyethylene glycol (GLYCOLAX) packet 17 g, Daily PRN  acetaminophen (TYLENOL) tablet 650 mg, Q6H PRN   Or  acetaminophen (TYLENOL) suppository 650 mg, Q6H PRN  nicotine (NICODERM CQ) 21 MG/24HR 1 patch, Daily  0.9 % sodium chloride infusion, Continuous  morphine (PF) injection 2 mg, Q3H PRN   Or  morphine injection 4 mg, Q3H PRN  LORazepam (ATIVAN) tablet 1 mg, Nightly  metronidazole (FLAGYL) 500 mg in NaCl 100 mL IVPB premix, Q8H        Objective:  /67   Pulse 93   Temp 99.3 °F (37.4 °C) (Oral)   Resp 16   Ht 5' 5\" (1.651 m)   Wt 159 lb (72.1 kg)   LMP 03/11/1991 (Approximate) Comment: menarche 7years old  SpO2 90%   BMI 26.46 kg/m²     Intake/Output Summary (Last 24 hours) at 1/24/2022 1108  Last data filed at 1/24/2022 0054  Gross per 24 hour   Intake 100 ml   Output --   Net 100 ml      Wt Readings from Last 3 Encounters:   01/24/22 159 lb (72.1 kg)   09/28/21 160 lb (72.6 kg)   04/05/21 148 lb (67.1 kg)       General appearance:  Appears comfortable  Eyes: Sclera clear. Pupils equal.  ENT: Moist oral mucosa. Trachea midline, no adenopathy. Cardiovascular: Regular rhythm, normal S1, S2. No murmur. No edema in lower extremities  Respiratory: Not using accessory muscles. Good inspiratory effort. Clear to auscultation bilaterally, no wheeze or crackles. GI: Abdomen soft, no tenderness, not distended, normal bowel sounds  Musculoskeletal: No cyanosis in digits, neck supple  Neurology: CN 2-12 grossly intact. No speech or motor deficits  Psych: Normal affect. Alert and oriented in time, place and person  Skin: Warm, dry, normal turgor    Labs and Tests:  CBC:   Recent Labs     01/23/22  1802 01/24/22  0424   WBC 20.7* 20.6*   HGB 10.1* 9.7*    203     BMP:    Recent Labs     01/23/22  1802 01/24/22  0424 01/24/22  1003   * 135* 137   K 5.1 5.5* 5.1   CL 97* 103 104   CO2 19* 20* 20*   BUN 21* 19 20   CREATININE <0.5* <0.5* 0.6   GLUCOSE 142* 135* 95     Hepatic:   Recent Labs     01/23/22  1802 01/24/22  0424   * 619*   * 522*   BILITOT 14.0* 13.8*   ALKPHOS 1,780* 1,746*       Discussed care with patient             Problem List  Active Problems:    Obstructive jaundice  Resolved Problems:    * No resolved hospital problems. *       Assessment & Plan:   1. Pancreatic mass  -With obstructive jaundice.  -Plan for EUS and ERCP for decompression today.  -Follow-up post    COPD  -Still with wheezing.   We will continue breathing treatment give dose of steroids as well          Diet: Diet NPO Exceptions are: Jessica [place] of [place] with Meds  Code:Full Code  DVT PPX lovenox       Jacob Faust MD   1/24/2022 11:08 AM

## 2022-01-24 NOTE — H&P
Gastroenterology Note             Pre-operative History and Physical    Patient: [place] Going  : 1945  CSN:     History Obtained From:  patient and/or guardian. HISTORY OF PRESENT ILLNESS:    The patient is a 77 y.o. female  here for endoscopic ultrasound and ERCP  Is jaundice and she has been jaundiced for 10 days she is having severe abdominal pain  She had a CT scan which revealed a markedly dilated common bile duct and a possible 4 cm mass in the head of the pancreas    Past Medical History:    Past Medical History:   Diagnosis Date    Aortic aneurysm ([facility]     monitored by Dr. Jocy Du disease ([facility]     Cancer of vulva ([facility]     microscopic invasive squamous carcinoma vulva    Cataract     COPD (chronic obstructive pulmonary disease) ([facility]     Emphysema (subcutaneous) (surgical) resulting from a procedure     History of radiation therapy      Completed external beam radiation therapy 14 total 5000 cGy to the right breast.     Hypertension     Lung bullae ([facility]     monitored by Dr. Vernia Leventhal vascular dis      Past Surgical History:    Past Surgical History:   Procedure Laterality Date    BREAST LUMPECTOMY Right 14    BREAST SURGERY      tumors removed    BRONCHOSCOPY  2018    BAL    EYE SURGERY      lasik    JOINT REPLACEMENT Bilateral     right and left KNEE    KNEE ARTHROSCOPY      KNEE SURGERY  9/1/10    REMOVAL OF RETAINED ANTIBIOTIC SPACERS,LEFT KNEE DEBRIDEMENT ANSD SYNOVECTOMY WITH FROZEN SECTION.  LEFT KNEE PLACEMENT OF ANTIBIOTIC SPACER LEFT KNEE    OTHER SURGICAL HISTORY  3-    subclavian stent left    OTHER SURGICAL HISTORY      stents bilateral femoral arteries    OTHER SURGICAL HISTORY Left 2/16/15    excision of left vulva lesion    [place] THORSC DX LUNGS/PERICAR/MED/PLEURAL SPACE W/O BX Left 2018    LEFT VIDEO ASSISTED THORACOSCOPY, WEDGE RESECTION, MEDIASTINAL LYMPH NODE DISSECTION performed by [facility] Julianne Licea MD at [address] [place] [address]      stent each groin    VULVECTOMY      History of radical vulvectomy with a left inguinal lymph node dissection November 2008. Medications Prior to Admission:   No current facility-administered medications on file prior to encounter. Current Outpatient Medications on File Prior to Encounter   Medication Sig Dispense Refill    albuterol sulfate  (90 Base) MCG/ACT inhaler Inhale 2 puffs into the lungs every 6 hours as needed for Wheezing 3 Inhaler 0    levalbuterol (XOPENEX) 0.63 MG/3ML nebulization TAKE 3 MLS BY MOUTH VIA NEBULIZER EVERY 4 HOURS AS NEEDED FOR WHEEZING 1080 mL 0    XARELTO 20 MG TABS tablet TK 1 T PO QAM  2    DULoxetine (CYMBALTA) 30 MG extended release capsule Take 30 mg by mouth daily      ibuprofen (ADVIL;MOTRIN) 400 MG tablet Take 1 tablet by mouth every 6 hours as needed for Pain (incisional) 90 tablet 3    amLODIPine (NORVASC) 5 MG tablet Take 5 mg by mouth daily      exemestane (AROMASIN) 25 MG chemo tablet TAKE 1 TABLET BY MOUTH ONCE A DAY FOR BREAST CANCER 90 tablet 0    OXYGEN Inhale into the lungs At night prn      HYDROcodone-acetaminophen (NORCO)  MG per tablet Take 1 tablet by mouth every 6 hours as needed for Pain. 15 tablet 0    bisacodyl (DULCOLAX) 5 MG EC tablet Take 5 mg by mouth nightly as needed for Constipation.  levothyroxine (SYNTHROID) 50 MCG tablet Take 50 mcg by mouth Daily.  guaifenesin (MUCINEX) 600 MG SR tablet Take 800 mg by mouth 3 times daily.  potassium chloride SA (K-DUR;KLOR-CON) 20 MEQ tablet Take 20 mEq by mouth daily       pregabalin (LYRICA) 150 MG capsule Take 150 mg by mouth 2 times daily.  rosuvastatin (CRESTOR) 10 MG tablet Take 20 mg by mouth daily.  lisinopril (PRINIVIL;ZESTRIL) 10 MG tablet Take 20 mg by mouth daily       lorazepam (ATIVAN) 1 MG tablet Take 3 mg by mouth every evening.       fluticasone (FLONASE) 50 MCG/ACT nasal spray 2 sprays by Nasal route daily.  fluticasone-salmeterol (ADVAIR HFA) 230-21 MCG/ACT inhaler INHALE 2 PUFFS BY MOUTH TWICE DAILY 1 Inhaler 2    Umeclidinium Bromide (INCRUSE ELLIPTA) 62.5 MCG/INH AEPB Inhale 1 puff into the lungs daily 1 each 3    INCRUSE ELLIPTA 62.5 MCG/INH AEPB INHALE 1 PUFF BY MOUTH DAILY AS DIRECTED 1 each 6        Allergies:  Codeine and Penicillins      Social History:   Social History     Tobacco Use    Smoking status: Current Every Day Smoker     Packs/day: 2.00     Years: 57.00     Pack years: 114.00     Types: Cigarettes    Smokeless tobacco: Never Used    Tobacco comment: started to smoke at 16 / smoked up to 2 ppd / now smoking 0.50 ppd cigarettes   Substance Use Topics    Alcohol use: No     Family History:   Family History   Problem Relation Age of Onset    Cancer Mother         cervical    Stroke Sister     Heart Disease Brother     Breast Cancer Maternal Aunt         x2       PHYSICAL EXAM:      /78   Pulse 98   Temp 98.3 °F (36.8 °C) (Temporal)   Resp 20   Ht 5' 5\" (1.651 m)   Wt 159 lb (72.1 kg)   LMP 03/11/1991 (Approximate) Comment: menarche 7years old  SpO2 98%   BMI 26.46 kg/m²  I        Heart:   RRR, normal s1s2    Lungs:  CTA bilat,  Normal effort    Abdomen:   NT, ND      ASA Grade:  ASA 4 - Patient with severe systemic disease that is a constant threat to life    Mallampati Class: 3            ASSESSMENT AND PLAN:    1. Patient is a 77 y.o. female here for endoscopic ultrasound and ERCP with general.   2.  Procedure options, risks and benefits reviewed with patient. Patient expresses understanding.     Shubham Baker MD,   [place]  1/24/2022

## 2022-01-24 NOTE — ANESTHESIA PRE PROCEDURE
Department of Anesthesiology  Preprocedure Note       Name:  Hester Alpers   Age:  77 y.o.  :  1945                                          MRN:  [id number]         Date:  2022      Surgeon: Amy Mcneal):  Flaco Scruggs MD    Procedure: Procedure(s):  ENDOSCOPIC ULTRASOUND  ERCP STENT INSERTION    Medications prior to admission:   Prior to Admission medications    Medication Sig Start Date End Date Taking? Authorizing Provider   albuterol sulfate  (90 Base) MCG/ACT inhaler Inhale 2 puffs into the lungs every 6 hours as needed for Wheezing 21  Yes Gonzalez Boo PA-C   levalbuterol (XOPENEX) 0.63 MG/3ML nebulization TAKE 3 MLS BY MOUTH VIA NEBULIZER EVERY 4 HOURS AS NEEDED FOR WHEEZING 19  Yes Ezequiel Tobin MD   XARELTO 20 MG TABS tablet TK 1 T PO QAM 9/15/19  Yes Historical Provider, MD   DULoxetine (CYMBALTA) 30 MG extended release capsule Take 30 mg by mouth daily   Yes Historical Provider, MD   ibuprofen (ADVIL;MOTRIN) 400 MG tablet Take 1 tablet by mouth every 6 hours as needed for Pain (incisional) 18  Yes Talat Bello, APRN - CNP   amLODIPine (NORVASC) 5 MG tablet Take 5 mg by mouth daily   Yes Historical Provider, MD   exemestane (AROMASIN) 25 MG chemo tablet TAKE 1 TABLET BY MOUTH ONCE A DAY FOR BREAST CANCER 17  Yes Lucas Gongora MD   OXYGEN Inhale into the lungs At night prn   Yes Historical Provider, MD   HYDROcodone-acetaminophen (NORCO)  MG per tablet Take 1 tablet by mouth every 6 hours as needed for Pain. 2/16/15  Yes Ken Birmingham MD   bisacodyl (DULCOLAX) 5 MG EC tablet Take 5 mg by mouth nightly as needed for Constipation. Yes Historical Provider, MD   levothyroxine (SYNTHROID) 50 MCG tablet Take 50 mcg by mouth Daily. Yes Historical Provider, MD   guaifenesin (MUCINEX) 600 MG SR tablet Take 800 mg by mouth 3 times daily.    Yes Historical Provider, MD   potassium chloride SA (K-DUR;KLOR-CON) 20 MEQ tablet Take 20 mEq by mouth daily Yes Historical Provider, MD   pregabalin (LYRICA) 150 MG capsule Take 150 mg by mouth 2 times daily. Yes Historical Provider, MD   rosuvastatin (CRESTOR) 10 MG tablet Take 20 mg by mouth daily. Yes Historical Provider, MD   lisinopril (PRINIVIL;ZESTRIL) 10 MG tablet Take 20 mg by mouth daily    Yes Historical Provider, MD   lorazepam (ATIVAN) 1 MG tablet Take 3 mg by mouth every evening. 3/8/10  Yes Historical Provider, MD   fluticasone (FLONASE) 50 MCG/ACT nasal spray 2 sprays by Nasal route daily.    Yes Historical Provider, MD   fluticasone-salmeterol (ADVAIR HFA) 230-21 MCG/ACT inhaler INHALE 2 PUFFS BY MOUTH TWICE DAILY 12/18/19   Daria Love MD   Umeclidinium Bromide (INCRUSE ELLIPTA) 62.5 MCG/INH AEPB Inhale 1 puff into the lungs daily 10/15/19   Daria Love MD   INCRUSE ELLIPTA 62.5 MCG/INH AEPB INHALE 1 PUFF BY MOUTH DAILY AS DIRECTED 2/11/19   Abel Colunga MD       Current medications:    Current Facility-Administered Medications   Medication Dose Route Frequency Provider Last Rate Last Admin    glucose (GLUTOSE) 40 % oral gel 15 g  15 g Oral PRN Fawad Bland MD        dextrose 50 % IV solution  12.5 g IntraVENous PRN Fawad Bland MD        glucagon (rDNA) injection 1 mg  1 mg IntraMUSCular PRN Fawad Bland MD        dextrose 5 % solution  100 mL/hr IntraVENous PRN Fawad Bland MD        ipratropium-albuterol (DUONEB) nebulizer solution 1 ampule  1 ampule Inhalation TID Donita Boyer MD        albuterol sulfate  (90 Base) MCG/ACT inhaler 2 puff  2 puff Inhalation Q4H PRN Donita Boyer MD        cefepime (MAXIPIME) 2000 mg IVPB minibag  2,000 mg IntraVENous Q12H SUSIE Meyers CNP   Stopped at 01/24/22 0924    amLODIPine (NORVASC) tablet 5 mg  5 mg Oral Daily SUSIE Meyers CNP   5 mg at 01/24/22 0854    bisacodyl (DULCOLAX) EC tablet 5 mg  5 mg Oral Nightly PRN Enedina Abdi, APRN - CNP        DULoxetine (CYMBALTA) extended release capsule 30 mg  30 mg Oral Daily Enedina Minick, APRN - CNP   30 mg at 01/24/22 0854    budesonide-formoterol (SYMBICORT) 160-4.5 MCG/ACT inhaler 2 puff  2 puff Inhalation BID Ema Miranda APRN - CNP   2 puff at 01/24/22 1236    levalbuterol (XOPENEX) nebulization 0.63 mg  0.63 mg Nebulization Q6H PRN Enedina Minick, APRN - CNP        levothyroxine (SYNTHROID) tablet 50 mcg  50 mcg Oral Daily Enedina Minick, APRN - CNP   50 mcg at 01/24/22 0620    pregabalin (LYRICA) capsule 150 mg  150 mg Oral BID Enedina Minick, APRN - CNP   150 mg at 01/24/22 0854    sodium chloride flush 0.9 % injection 5-40 mL  5-40 mL IntraVENous 2 times per day Enedina Minick, APRN - CNP        sodium chloride flush 0.9 % injection 5-40 mL  5-40 mL IntraVENous PRN Enedina Minick, APRN - CNP        0.9 % sodium chloride infusion  25 mL IntraVENous PRN Enedina Minick, APRN - CNP        ondansetron (ZOFRAN-ODT) disintegrating tablet 4 mg  4 mg Oral Q8H PRN Enedina Minick, APRN - CNP        Or    ondansetron (ZOFRAN) injection 4 mg  4 mg IntraVENous Q6H PRN Enedina Minick, APRN - CNP        polyethylene glycol (GLYCOLAX) packet 17 g  17 g Oral Daily PRN Enedina Minick, APRN - CNP        acetaminophen (TYLENOL) tablet 650 mg  650 mg Oral Q6H PRN Enedina Minick, APRN - CNP        Or    acetaminophen (TYLENOL) suppository 650 mg  650 mg Rectal Q6H PRN Enedina Minick, APRN - CNP        nicotine (NICODERM CQ) 21 MG/24HR 1 patch  1 patch TransDERmal Daily Enedina Minick, APRN - CNP   1 patch at 01/24/22 0854    0.9 % sodium chloride infusion   IntraVENous Continuous Enedina Minick, APRN -  mL/hr at 01/24/22 0122 New Bag at 01/24/22 0122    morphine (PF) injection 2 mg  2 mg IntraVENous Q3H PRN Enedina Minick, APRN - CNP        Or    morphine injection 4 mg  4 mg IntraVENous Q3H PRN Enedina Minick, APRN - CNP        LORazepam (ATIVAN) tablet 1 mg  1 mg Oral Nightly Enedina Minick, APRN - CNP   1 mg at 01/24/22 0122    metronidazole (FLAGYL) 500 mg in NaCl 100 mL IVPB premix  500 mg IntraVENous Q8H Enedina Abdi, APRN -  mL/hr at 01/24/22 1336 500 mg at 01/24/22 1336       Allergies: Allergies   Allergen Reactions    Codeine Hives    Penicillins Hives       Problem List:    Patient Active Problem List   Diagnosis Code    Breast mass, right N63.10    Breast cancer ([facility] C50.919    Osteoporosis M81.0    Cancer of vulva ([facility] C51.9    Diabetes ([facility] E11.9    Dysphagia, oropharyngeal R13.12    Peripheral blood vessel disorder (HCC) I73.9    Procidentia of rectum K62.3    Gonalgia M25.569    Cervical spinal stenosis M48.02    HTN (hypertension), benign I10    Pulmonary emphysema (HCC) J43.9    History of cancer of vulva Z85.44    Smoking F17.200    History of right breast cancer Z85.3    Malignant neoplasm of lower-outer quadrant of right female breast ([facility] C50.511    History of vulvar dysplasia Z87.412    Encounter for follow-up surveillance of breast cancer Z08, Z85.3    Depression F32. A    Pap smear of cervix shows high risk HPV present R87.810    Chronic respiratory failure with hypoxia (HCC) J96.11    Abnormal CT of the chest R93.89    Respiratory failure (HCC) J96.90    Acute respiratory failure (HCC) J96.00    Moderate COPD (chronic obstructive pulmonary disease) (HCC) J44.9    Chest pain R07.9    Squamous cell carcinoma of left lung (HCC) C34.92    Primary cancer of left upper lobe of lung (HCC) C34.12    Generalized weakness R53.1    Numbness and tingling R20.0, R20.2    Cervical disc disease with myelopathy M50.00    Dyslipidemia E78.5    Obstructive jaundice K83.1       Past Medical History:        Diagnosis Date    Aortic aneurysm (HCC)     monitored by Dr. Angel Rowland disease ([facility]     Cancer of vulva ([facility] 2008    microscopic invasive squamous carcinoma vulva    Cataract     COPD (chronic obstructive pulmonary disease) ([facility]     Emphysema (subcutaneous) (surgical) resulting from a procedure     History of radiation therapy      Completed external beam radiation therapy 04/23/14 total 5000 cGy to the right breast.     Hypertension     Lung bullae ([address]     monitored by Dr. Jony Saunders vascular dis        Past Surgical History:        Procedure Laterality Date    BREAST LUMPECTOMY Right 2/6/14    BREAST SURGERY      tumors removed    BRONCHOSCOPY  01/25/2018    BAL    EYE SURGERY      lasik    JOINT REPLACEMENT Bilateral     right and left KNEE    KNEE ARTHROSCOPY      KNEE SURGERY  9/1/10    REMOVAL OF RETAINED ANTIBIOTIC SPACERS,LEFT KNEE DEBRIDEMENT ANSD SYNOVECTOMY WITH FROZEN SECTION. LEFT KNEE PLACEMENT OF ANTIBIOTIC SPACER LEFT KNEE    OTHER SURGICAL HISTORY  3-2010    subclavian stent left    OTHER SURGICAL HISTORY      stents bilateral femoral arteries    OTHER SURGICAL HISTORY Left 2/16/15    excision of left vulva lesion    [place] THORSC DX LUNGS/PERICAR/MED/PLEURAL SPACE W/O BX Left 11/2/2018    LEFT VIDEO ASSISTED THORACOSCOPY, WEDGE RESECTION, MEDIASTINAL LYMPH NODE DISSECTION performed by Duarte Munoz MD at [address]      stent each groin    VULVECTOMY      History of radical vulvectomy with a left inguinal lymph node dissection November 2008. Social History:    Social History     Tobacco Use    Smoking status: Current Every Day Smoker     Packs/day: 2.00     Years: 57.00     Pack years: 114.00     Types: Cigarettes    Smokeless tobacco: Never Used    Tobacco comment: started to smoke at 16 / smoked up to 2 ppd / now smoking 0.50 ppd cigarettes   Substance Use Topics    Alcohol use:  No                                Ready to quit: Not Answered  Counseling given: Not Answered  Comment: started to smoke at 15 / smoked up to 2 ppd / now smoking 0.50 ppd cigarettes      Vital Signs (Current):   Vitals:    01/24/22 0500 01/24/22 0845 01/24/22 1218 01/24/22 1235   BP: 114/71 104/67 106/67    Pulse: 87 93 96    Resp: 18 16 16 16   Temp: 98.1 °F (36.7 °C) 99.3 °F (37.4 °C) 98.4 °F (36.9 °C)    TempSrc: Oral Oral Oral    SpO2: 93% 90% 93% 90%   Weight:       Height:                                                  BP Readings from Last 3 Encounters:   01/24/22 106/67   04/05/21 134/73   02/01/21 (!) 167/80       NPO Status:                                                                                 BMI:   Wt Readings from Last 3 Encounters:   01/24/22 159 lb (72.1 kg)   09/28/21 160 lb (72.6 kg)   04/05/21 148 lb (67.1 kg)     Body mass index is 26.46 kg/m².     CBC:   Lab Results   Component Value Date    WBC 20.6 01/24/2022    RBC 3.22 01/24/2022    RBC 4.43 01/27/2017    HGB 9.7 01/24/2022    HCT 30.5 01/24/2022    MCV 94.5 01/24/2022    RDW 17.5 01/24/2022     01/24/2022       CMP:   Lab Results   Component Value Date     01/24/2022    K 5.1 01/24/2022    K 5.5 01/24/2022     01/24/2022    CO2 20 01/24/2022    BUN 20 01/24/2022    CREATININE 0.6 01/24/2022    GFRAA >60 01/24/2022    GFRAA >60 06/30/2012    AGRATIO 1.3 01/24/2022    LABGLOM >60 01/24/2022    GLUCOSE 95 01/24/2022    PROT 4.8 01/24/2022    PROT 6.8 05/12/2011    CALCIUM 9.1 01/24/2022    BILITOT 13.8 01/24/2022    ALKPHOS 1,746 01/24/2022     01/24/2022     01/24/2022       POC Tests:   Recent Labs     01/24/22  1118   POCGLU 120*       Coags:   Lab Results   Component Value Date    PROTIME 15.6 01/24/2022    PROTIME 13.5 06/07/2011    INR 1.36 01/24/2022    APTT 33.1 01/24/2022       HCG (If Applicable): No results found for: PREGTESTUR, PREGSERUM, HCG, HCGQUANT     ABGs:   Lab Results   Component Value Date    PHART 7.386 11/02/2018    PO2ART 65.4 11/02/2018    SAB4ISZ 40.7 11/02/2018    GBX8EUJ 24.4 11/02/2018    BEART -0.7 11/02/2018    N1DOZQRC 93.8 11/02/2018        Type & Screen (If Applicable):  Lab Results   Component Value Date    LABABO A 12/01/2010    LABRH Positive 12/01/2010 Drug/Infectious Status (If Applicable):  No results found for: HIV, HEPCAB    COVID-19 Screening (If Applicable):   Lab Results   Component Value Date    COVID19 Not Detected 01/23/2022    COVID19 Not Detected 02/01/2021           Anesthesia Evaluation  Patient summary reviewed and Nursing notes reviewed no history of anesthetic complications:   Airway: Mallampati: III  TM distance: >3 FB   Neck ROM: full  Mouth opening: > = 3 FB Dental:    (+) upper dentures      Pulmonary:   (+) COPD (acute exacerbation, on nebs and steroids scheduled):  decreased breath sounds,  current smoker    (-) sleep apnea                           Cardiovascular:  Exercise tolerance: poor (<4 METS),   (+) hypertension:, pulmonary hypertension (echo 2018 EF 75 no rwma mild mr mod tr rsvp 58):, hyperlipidemia    (-) past MI, CAD, CABG/stent, dysrhythmias,  angina and  CHF    ECG reviewed  Rhythm: regular  Rate: normal  Echocardiogram reviewed                  Neuro/Psych:   (+) depression/anxiety    (-) seizures, TIA and CVA           GI/Hepatic/Renal:   (+) liver disease (panc mass w/ obst jaundice):,      (-) GERD and no renal disease       Endo/Other:    (+) Diabetes (a1c 6.3)Type II DM, , hypothyroidism: arthritis: OA., .                 Abdominal:             Vascular:   + PVD, aortic or cerebral (descending thoracic aneurysm measuring 5 cm, LICA 14-67% stenosis 2021), . Other Findings:           Anesthesia Plan      general     ASA 4       Induction: intravenous. MIPS: Prophylactic antiemetics administered. Anesthetic plan and risks discussed with patient. Plan discussed with CRNA.                   Ambrose Grant MD   1/24/2022

## 2022-01-24 NOTE — PROGRESS NOTES
0845: Shift assessment completed, morning medications given per MAR. VSS, alert and oriented. Call light within reach. The care plan and education has been reviewed and mutually agreed upon with the patient.

## 2022-01-24 NOTE — H&P
HOSPITALISTS HISTORY AND PHYSICAL    1/23/2022 10:14 PM    Patient Information:  Sushma Walker is a 77 y.o. female [id number]  PCP:  Giovani Hodge MD (Tel: [phone] )    Chief complaint:    Chief Complaint   Patient presents with    Abnormal Lab     Patient in by squad from home with elevated liver enzymes and abd pain. History of Present Illness:  Cal Ching is a 77 y.o. female history of breast cancer, hypertension, DVT, wheelchair-bound, and COPD on 2 L nasal cannula at night. Patient presents the emergency room for abdominal pain. She states her abdominal pain started approximately 10 days ago it is more epigastric pain. She states her pain is 10 out of 10. Abdominal pain did get significantly worse yesterday. She did get relief from IV pain medicine in the emergency room. She noticed skin color changes around the same time the abdominal pain. She has had decreased appetite and has not eaten in the last 3 days. When she does try to eat it causes pain. She does have some nausea but no vomiting. Her last bowel movement was yesterday. She has noted that her urine is dark orange to rust color. She does report a temperature of 100 yesterday. In the emergency room she is afebrile. History obtained from patient    REVIEW OF SYSTEMS:   Review of Systems   Constitutional: Positive for appetite change, fatigue and fever. HENT: Negative. Eyes: Negative. Respiratory: Positive for wheezing. Negative for shortness of breath. Chronic cough unchanged   Gastrointestinal: Positive for abdominal pain and nausea. Negative for diarrhea and vomiting. Endocrine: Negative. Genitourinary: Negative for difficulty urinating. Urine dark orange to rust color   Musculoskeletal:        Chronic neck pain   Skin: Positive for color change.    Neurological: Positive for weakness. Negative for dizziness and speech difficulty. Hematological: Negative. Psychiatric/Behavioral: Negative. All other systems reviewed and are negative. Past Medical History:   has a past medical history of Aortic aneurysm ([facility] [address] Arthritis, Buerger's disease ([facility] [address] Cancer of vulva ([facility] [address] Cataract, COPD (chronic obstructive pulmonary disease) ([facility] [address] Emphysema (subcutaneous) (surgical) resulting from a procedure, History of radiation therapy, Hypertension, Lung bullae ([facility] [address] and Periph vascular dis. Past Surgical History:   has a past surgical history that includes Breast surgery; Knee arthroscopy; Vulvectomy (); other surgical history (3-2010); eye surgery; knee surgery (9/1/10); vascular surgery; Breast lumpectomy (Right, 2/6/14); other surgical history; other surgical history (Left, 2/16/15); joint replacement (Bilateral); bronchoscopy (01/25/2018); and pr thorsc dx lungs/pericar/med/pleural space w/o bx (Left, 11/2/2018). Medications:  No current facility-administered medications on file prior to encounter.      Current Outpatient Medications on File Prior to Encounter   Medication Sig Dispense Refill    albuterol sulfate  (90 Base) MCG/ACT inhaler Inhale 2 puffs into the lungs every 6 hours as needed for Wheezing 3 Inhaler 0    levalbuterol (XOPENEX) 0.63 MG/3ML nebulization TAKE 3 MLS BY MOUTH VIA NEBULIZER EVERY 4 HOURS AS NEEDED FOR WHEEZING 1080 mL 0    fluticasone-salmeterol (ADVAIR HFA) 230-21 MCG/ACT inhaler INHALE 2 PUFFS BY MOUTH TWICE DAILY 1 Inhaler 2    XARELTO 20 MG TABS tablet TK 1 T PO QAM  2    DULoxetine (CYMBALTA) 30 MG extended release capsule Take 30 mg by mouth daily      Umeclidinium Bromide (INCRUSE ELLIPTA) 62.5 MCG/INH AEPB Inhale 1 puff into the lungs daily 1 each 3    INCRUSE ELLIPTA 62.5 MCG/INH AEPB INHALE 1 PUFF BY MOUTH DAILY AS DIRECTED 1 each 6    ibuprofen (ADVIL;MOTRIN) 400 MG tablet Take 1 tablet by mouth every 6 hours as needed for Pain (incisional) 90 tablet 3    amLODIPine (NORVASC) 5 MG tablet Take 5 mg by mouth daily      exemestane (AROMASIN) 25 MG chemo tablet TAKE 1 TABLET BY MOUTH ONCE A DAY FOR BREAST CANCER 90 tablet 0    OXYGEN Inhale into the lungs At night prn      HYDROcodone-acetaminophen (NORCO)  MG per tablet Take 1 tablet by mouth every 6 hours as needed for Pain. 15 tablet 0    bisacodyl (DULCOLAX) 5 MG EC tablet Take 5 mg by mouth nightly as needed for Constipation.  levothyroxine (SYNTHROID) 50 MCG tablet Take 50 mcg by mouth Daily.  guaifenesin (MUCINEX) 600 MG SR tablet Take 800 mg by mouth 3 times daily.  potassium chloride SA (K-DUR;KLOR-CON) 20 MEQ tablet Take 20 mEq by mouth daily       pregabalin (LYRICA) 150 MG capsule Take 150 mg by mouth 2 times daily.  rosuvastatin (CRESTOR) 10 MG tablet Take 20 mg by mouth daily.  lisinopril (PRINIVIL;ZESTRIL) 10 MG tablet Take 20 mg by mouth daily       lorazepam (ATIVAN) 1 MG tablet Take 3 mg by mouth every evening.  fluticasone (FLONASE) 50 MCG/ACT nasal spray 2 sprays by Nasal route daily. Allergies: Allergies   Allergen Reactions    Codeine Hives    Penicillins Hives        Social History:  Patient Lives with daughter   reports that she has been smoking cigarettes. She has a 114.00 pack-year smoking history. She has never used smokeless tobacco. She reports current drug use. She reports that she does not drink alcohol. Family History:  family history includes Breast Cancer in her maternal aunt; Cancer in her mother; Heart Disease in her brother; Stroke in her sister. ,     Physical Exam:  /73   Pulse 94   Temp 96.2 °F (35.7 °C) (Oral)   Resp 12   LMP 03/11/1991 (Approximate) Comment: menarche 7years old  SpO2 94%   Physical Exam  Vitals and nursing note reviewed. Constitutional:       Appearance: Normal appearance. She is normal weight. She is ill-appearing.       Comments: Patient is jaundice, HENT:      Head: Normocephalic. Nose: Nose normal.      Mouth/Throat:      Mouth: Mucous membranes are moist.   Eyes:      Pupils: Pupils are equal, round, and reactive to light. Cardiovascular:      Rate and Rhythm: Normal rate and regular rhythm. Pulses: Normal pulses. Heart sounds: No murmur heard. Pulmonary:      Effort: Pulmonary effort is normal.      Breath sounds: Normal breath sounds. Abdominal:      General: Bowel sounds are normal. There is distension. Palpations: Abdomen is soft. Tenderness: There is abdominal tenderness. There is guarding. Comments: Tenderness in upper quadrants. Musculoskeletal:         General: Normal range of motion. Right lower leg: No edema. Left lower leg: No edema. Skin:     Capillary Refill: Capillary refill takes less than 2 seconds. Coloration: Skin is jaundiced. Findings: No lesion or rash. Neurological:      General: No focal deficit present. Mental Status: She is alert and oriented to person, place, and time. Psychiatric:         Mood and Affect: Mood normal.         Behavior: Behavior normal.         Labs:  CBC:   Lab Results   Component Value Date    WBC 20.7 01/23/2022    RBC 3.36 01/23/2022    RBC 4.43 01/27/2017    HGB 10.1 01/23/2022    HCT 31.8 01/23/2022    MCV 94.7 01/23/2022    MCH 30.1 01/23/2022    MCHC 31.8 01/23/2022    RDW 18.0 01/23/2022     01/23/2022    MPV 12.0 01/23/2022     BMP:    Lab Results   Component Value Date     01/23/2022    K 5.1 01/23/2022    K 4.2 03/19/2019    CL 97 01/23/2022    CO2 19 01/23/2022    BUN 21 01/23/2022    CREATININE <0.5 01/23/2022    CALCIUM 9.8 01/23/2022    GFRAA >60 01/23/2022    GFRAA >60 06/30/2012    LABGLOM >60 01/23/2022    GLUCOSE 142 01/23/2022     CT ABDOMEN PELVIS W IV CONTRAST Additional Contrast? None   Final Result   1. 4.1 cm low-density mass in the pancreatic head suspicious for a primary   pancreatic neoplasm.   Severe Pancreatic Head Mass 4.1 cm  Patient will be admitted with Telemetry  GI consult   monitor labs  Patient aware of pancreatic mass     2. Abdominal Pain   Mass and obstruction  IV pain medications. 3. Suspected Acute Cholecystitis   could not be rule out on CT, could be reactive  Patient has Elevated WBC and temp at home 100 yesterday. She is afebrile here. IV antibiotics Cefepime and Flagyl, patient has allergy PCN    4. UTI  Antibitoics, cx pending    5. COPD no exacerbation  Mild wheezing on exam.  Continue home inhalers  Chronically wears 2L at night    6. Thoracic Aortic Aneurysm  Follows with Dr. Amador Hidalgo,  Stable. 7. Hypertension  Continue home meds    8. Hx DVT  Hold xarelto tonight for possible ERCP tomorrow. Will need restarted. 9. Hx Breast Cancer  No longer on oral chemo      DVT prophylaxis SCDs, holding xarelto for procedure  Code status Full   Diet NPO  IV access peripheral   Ponce Catheter none    Admit as inpatient. I anticipate hospitalization spanning more than two midnights for investigation and treatment of the above medically necessary diagnoses. Please note that some part of this chart was generated using Dragon dictation software. Although every effort was made to ensure the accuracy of this automated transcription, some errors in transcription may have occurred inadvertently. If you may need any clarification, please do not hesitate to contact me through [facility].        SUSIE Asencio - CHANTELLE    1/23/2022 10:14 PM

## 2022-01-24 NOTE — CARE COORDINATION
Discharge Planning Assessment  Risk of Readmission Score 15%    RN/SW discharge planner met with patient to discuss reason for admission, current living situation, and potential needs at the time of discharge    Demographics/Insurance verified Yes    Current type of dwelling: Single story home with 2 steps to enter. The family built a ramp that is too steep. The patient states she has trouble entering the home. Patient from ECF/SW confirmed with: N/A    Living arrangements: Daughter    Level of function/Support: The patient use a walker to ambulate. The patient needs assistance with ADL's. PCP: Ritu Ledesma MD    Last Visit to PCP: 2 days ago    DME: walker, shower seat, grab bars in the shower, raised toilet     Active with any community resources/agencies/skilled home care: The patient stated yes but is not able to recall the name. Medication compliance issues: None    Financial issues that could impact healthcare: None        Tentative discharge plan: TBD, PT/OT pending, unable to contact the daughter by phone and not able to leave message.     Transportation at the time of discharge:      EDSON De Guzman RN      Phone: [phone]

## 2022-01-24 NOTE — PROGRESS NOTES
Physical Therapy    Facility/Department: [address] ORTHO/NEURO NURSING  Initial Assessment    NAME: Eliceo Velez  : 1945  MRN: [id number]    Date of Service: 2022    Discharge Recommendations:Linda Peck scored a  on the AM-PAC short mobility form. Current research shows that an AM-PAC score of 17 or less is typically not associated with a discharge to the patient's home setting. Based on the patient's AM-PAC score and their current functional mobility deficits, it is recommended that the patient have 3-5 sessions per week of Physical Therapy at d/c to increase the patient's independence. Please see assessment section for further patient specific details. If patient discharges prior to next session this note will serve as a discharge summary. Please see below for the latest assessment towards goals. PT Equipment Recommendations  Equipment Needed: No    Assessment   Body structures, Functions, Activity limitations: Decreased functional mobility ; Decreased ADL status; Decreased ROM; Decreased strength;Decreased endurance;Decreased balance; Increased pain;Decreased posture  Assessment: Pt presents as below her baseline function and would benefit from skilled PT services to promote safe return to PLOF. Prognosis: Fair  Decision Making: Medium Complexity  PT Education: Goals;PT Role;Plan of Care  Patient Education: D/C recommendations--pt verbalized understanding  REQUIRES PT FOLLOW UP: Yes  Activity Tolerance  Activity Tolerance: Patient limited by pain; Patient limited by endurance       Patient Diagnosis(es): The primary encounter diagnosis was Jaundice, non-. Diagnoses of Transaminitis, Hyperbilirubinemia, and Pancreatic mass were also pertinent to this visit.      has a past medical history of Aortic aneurysm ([facility] [address] Arthritis, Buerger's disease ([facility] [address] Cancer of vulva ([facility] [address] Cataract, COPD (chronic obstructive pulmonary disease) ([facility] [address] Emphysema (subcutaneous) (surgical) resulting from a procedure, History of radiation therapy, Hypertension, Lung bullae ([address] and Periph vascular dis. has a past surgical history that includes Breast surgery; Knee arthroscopy; Vulvectomy (); other surgical history (3-2010); eye surgery; knee surgery (9/1/10); vascular surgery; Breast lumpectomy (Right, 2/6/14); other surgical history; other surgical history (Left, 2/16/15); joint replacement (Bilateral); bronchoscopy (01/25/2018); and pr thorsc dx lungs/pericar/med/pleural space w/o bx (Left, 11/2/2018). Restrictions  Restrictions/Precautions  Restrictions/Precautions: Fall Risk (High)  Required Braces or Orthoses?: No  Position Activity Restriction  Other position/activity restrictions: 77 y.o. female who presents to the emergency department today for evaluation for an abnormal laboratory test.  She was sent in for an abnormal laboratory test including elevated liver enzymes. The patient tells me that she always has abdominal pain, cramping however she has had a decrease in appetite since Friday and she states that she is noticing increasing cramping to her upper abdomen which has been ongoing since Friday as well. The patient states that her pain seems to wax and wane on its own, and she otherwise denies any known alleviating or aggravating factors. Patient states she is nauseous and has not had much of an appetite although denies any vomiting or diarrhea. The patient denies any fever or chills. She is no cough or congestion. She denies any chest pain or shortness of breath. The patient states that she fell yesterday, and she states that her wheelchair slipped from behind her, and when lifting her up back into the chair she noticed some increasing pain to her ribs. Patient wears 2 L of oxygen as needed but seems to have been needing it more over the past couple of days. Denies any alcohol use. She does take Norco for chronic pain, she otherwise has no complaints.   She did not hit her head.  No loss of consciousness. No vomiting  Vision/Hearing  Vision: Within Functional Limits  Hearing: Within functional limits     Subjective  General  Chart Reviewed: Yes  Patient assessed for rehabilitation services?: Yes  Response To Previous Treatment: Not applicable  Family / Caregiver Present: No  Diagnosis: Hyperbilirubinemia  Follows Commands: Within Functional Limits  General Comment  Comments: Pt supine in bed upon arrival.  Subjective  Subjective: Pt agreeable to PT/OT eval.  Pain Screening  Patient Currently in Pain: Denies  Vital Signs  Patient Currently in Pain: Denies       Orientation  Orientation  Overall Orientation Status: Within Functional Limits  Social/Functional History  Social/Functional History  Lives With: Daughter  Type of Home: House  Home Layout: Two level  Home Access: Ramped entrance  Bathroom Shower/Tub: Walk-in shower  Bathroom Toilet: Standard  Bathroom Equipment: Shower chair  Bathroom Accessibility: Accessible,Wheelchair accessible  Home Equipment: [address] Help From: Family (Daughter provides assist for all ADLs and is not currently working per pt.)  ADL Assistance: Needs assistance  Toileting: Needs assistance  Homemaking Assistance: Needs assistance  Homemaking Responsibilities: No  Ambulation Assistance: Needs assistance  Transfer Assistance: Needs assistance  Active : No  Mode of Transportation: Car  Leisure & Hobbies: Gardening  Additional Comments: Pt reports multiple falls within the year. Sleeps in a flat bed. Daugther provides assist with transportation. Cognition        Objective     Observation/Palpation  Posture: Poor  Observation: kyphotic and presents with generalized weakness. Pure wick in place.     AROM RLE (degrees)  RLE AROM: WFL  AROM LLE (degrees)  LLE AROM : WFL  Strength RLE  Strength RLE: WFL  Strength LLE  Strength LLE: WFL  Tone RLE  RLE Tone: Normotonic  Tone LLE  LLE Tone: Normotonic  Motor Control  Gross Motor?: WFL  Sensation  Overall Sensation Status: WFL  Bed mobility  Rolling to Right: Maximum assistance  Supine to Sit: 2 Person assistance;Maximum assistance  Sit to Supine: 2 Person assistance;Maximum assistance  Scooting: Dependent/Total;2 Person assistance  Transfers  Sit to Stand: Maximum Assistance  Stand to sit: Maximum Assistance  Ambulation  Ambulation?: No  Stairs/Curb  Stairs?: No     Balance  Posture: Poor  Sitting - Static: Fair;-  Sitting - Dynamic: Poor  Standing - Static: Poor  Standing - Dynamic:  (Corey)        Plan   Plan  Times per week: 3-5x  Times per day: Daily  Current Treatment Recommendations: Strengthening,ROM,Balance Training,Functional Mobility Training,Transfer Ruth RowleyHolland Education & Training,Patient/Caregiver Education & Training,Equipment Evaluation, Education, & procurement,Wheelchair Mobility Training  Safety Devices  Type of devices: All fall risk precautions in place,Call light within reach,Bed alarm in place,Patient at risk for falls,Gait belt,Left in bed,Nurse notified  Restraints  Initially in place: No    G-Code       OutComes Score                                                  AM-PAC Score  AM-PAC Inpatient Mobility Raw Score : 7 (01/24/22 1049)  AM-PAC Inpatient T-Scale Score : 26.42 (01/24/22 1049)  Mobility Inpatient CMS 0-100% Score: 92.36 (01/24/22 1049)  Mobility Inpatient CMS G-Code Modifier : CM (01/24/22 1049)          Goals  Short term goals  Time Frame for Short term goals:  To be met prior to discharge  Short term goal 1: Pt will complete bed mobility with mod A  Short term goal 2: Pt will tolerate sitting at EOB x5 minutes with CGA  Short term goal 3: Pt will complete stand pivot transfer with mod A       Therapy Time   Individual Concurrent Group Co-treatment   Time In 0925         Time Out 1004         Minutes 39         Timed Code Treatment Minutes: [address]     Ashlee Dry, [address] [facility], DPT, [id number]  Ashlee Dry, PT

## 2022-01-24 NOTE — PROCEDURES
Endoscopy Note    Patient: Joey Sun  : 1945  CSN:     Procedure: Esophagogastroduodenoscopy with Endo scopic ultrasound and fine-needle    Date:  2022     Surgeon:  Veronica Trejo MD     Referring Physician: Leon Lopez and Nelson Nava    Preoperative Diagnosis: Jaundice and abnormal CT scan    Postoperative Diagnosis: #1 patient is a 3.7 x 2.6 cm hypoechoic mass in the head of the pancreas biopsied for time  #2 I did not see any metastatic lesions to the liver  #3 the celiac axis was normal  #4 I did not see invasion into the portal vein  # 5 I do have some concerns about the superior mesenteric artery  #6 I would label this is a T3 N0 M0 at this point in time      Anesthesia: General    EBL: <5 mL    Indications: This is a 77y.o. year old female who presents jaundiced to the emergency room she been jaundiced for 10 days she then lost her appetite she has not been able to eat for several days yesterday she came in because she was having severe pain  CT scan noted that she had a 4 cm mass in the head of the pancreas  He was also markedly jaundiced with an elevated white count suggesting she may have some form of cholangitis    Description of Procedure:  Informed consent was obtained from the patient after explanation of indications, benefits and possible risks and complications of the procedure. The patient was then taken to the endoscopy suite, placed in the left lateral decubitus position and the above IV sedation was administrered.     The Olympus linear EUS scope gently was placed over tongue and under esophagus down to her stomach were noted that she did have some retained food we then went to the duodenal bulb into the sweep of could not easily get around the sweep and I did not want to try to push the scope around this week because she had recently stopped her Eliquis    I was able to then visualize the tumor the common bile duct was markedly dilated measured the common bile duct at 2 cm I then saw a very irregular hypoechoic a large mass in its biggest dimension I measured the mass to be 3.7 x 2.6 sent    I then used a 25-gauge needle 4 times and took 4 biopsies up with us in formalin because we did not have onsite pathology at this particular moment    The celiac axis was normal but I am concerned that the superior mesenteric artery may have some encasement I got this on one view and I just could not reproduce it    The liver the left lobe and the caudate lobe I looked at multiple times I did not see any small metastatic lesions there    The gallbladder definitely was distended thickened walled and there was some small stones in    We then terminated this portion of the procedure to go onto the ERCP. Gastric or Duodenal ulcer present: no      The patient tolerated the procedure well and was taken to the post anesthesia care unit in good condition.       Impression: Number one 3.7 x 2.6 cm pancreatic mass T3 N0 M0 lesion      Recommendations: #1 move on to ERCP #2 await the results of the biopsy    Hazel Sullivan MD, MD  [address] [facility]  [phone]

## 2022-01-24 NOTE — PROGRESS NOTES
Pt arrived from endo to PACU bay 10. Report received from endo staff. Pt minimally arousable to voice. Pt arrives with simple mask in place infusing 6L oxygen; vitals as charted; pt placed on bi-pap per Dr Paulina Mcgill.

## 2022-01-24 NOTE — ANESTHESIA POSTPROCEDURE EVALUATION
Department of Anesthesiology  Postprocedure Note    Patient: Stephen Duenas  MRN: [id number]  YOB: 1945  Date of evaluation: 1/24/2022  Time:  5:37 PM     Procedure Summary     Date: 01/24/22 Room / Location: [address]    Anesthesia Start: 1539 Anesthesia Stop:     Procedures:       EGD W/EUS FNA (N/A )      ERCP STENT INSERTION (N/A ) Diagnosis: (Pancreatic Mass)    Surgeons: Pako Yun MD Responsible Provider: Mike House MD    Anesthesia Type: general ASA Status: 4          Anesthesia Type: No value filed. Claudio Phase I: Claudio Score: 9    Claudio Phase II:      Last vitals: Reviewed and per EMR flowsheets.        Anesthesia Post Evaluation    Patient location during evaluation: PACU  Patient participation: complete - patient participated  Level of consciousness: awake  Airway patency: patent  Nausea & Vomiting: no vomiting and no nausea  Complications: no  Cardiovascular status: hemodynamically stable  Respiratory status: acceptable  Hydration status: stable  Multimodal analgesia pain management approach

## 2022-01-24 NOTE — PROGRESS NOTES
01/24/22 1236   RT Protocol   History Pulmonary Disease 2   Respiratory pattern 0   Breath sounds 4   Cough 1   Bronchodilator Assessment Score 7

## 2022-01-24 NOTE — ED NOTES
Per Patient it is okay to speak with Westley Lab her granddaughter. Granddaughter updates about plan of care and that the Pt is being admitted. She is aware of surgery most likely in the am. No new questions at this time. She was advised to call ahead to ask about visitation tomorrow.       Jerilynn Dakins, RN  01/23/22 4958

## 2022-01-24 NOTE — RT PROTOCOL NOTE
RT Inhaler-Nebulizer Bronchodilator Protocol Note    There is a bronchodilator order in the chart from a provider indicating to follow the RT Bronchodilator Protocol and there is an Initiate RT Inhaler-Nebulizer Bronchodilator Protocol order as well (see protocol at bottom of note). CXR Findings:  XR CHEST PORTABLE    Result Date: 1/23/2022  Hazy interstitial opacities throughout both lungs which is more prominent on the left and is increased. This could represent early multisegmental interstitial pneumonitis. Recommend follow-up Questionable small hiatal hernia. The findings from the last RT Protocol Assessment were as follows:   History Pulmonary Disease: Chronic pulmonary disease  Respiratory Pattern: Regular pattern and RR 12-20 bpm  Breath Sounds: Intermittent or unilateral wheezes  Cough: Strong, productive  Indication for Bronchodilator Therapy:    Bronchodilator Assessment Score: 7    Aerosolized bronchodilator medication orders have been revised according to the RT Inhaler-Nebulizer Bronchodilator Protocol below. Respiratory Therapist to perform RT Therapy Protocol Assessment initially then follow the protocol. Repeat RT Therapy Protocol Assessment PRN for score 0-3 or on second treatment, BID, and PRN for scores above 3. No Indications - adjust the frequency to every 6 hours PRN wheezing or bronchospasm, if no treatments needed after 48 hours then discontinue using Per Protocol order mode. If indication present, adjust the RT bronchodilator orders based on the Bronchodilator Assessment Score as indicated below. Use Inhaler orders unless patient has one or more of the following: on home nebulizer, not able to hold breath for 10 seconds, is not alert and oriented, cannot activate and use MDI correctly, or respiratory rate 25 breaths per minute or more, then use the equivalent nebulizer order(s) with same Frequency and PRN reasons based on the score.   If a patient is on this medication at home then do not decrease Frequency below that used at home. 0-3 - enter or revise RT bronchodilator order(s) to equivalent RT Bronchodilator order with Frequency of every 4 hours PRN for wheezing or increased work of breathing using Per Protocol order mode. 4-6 - enter or revise RT Bronchodilator order(s) to two equivalent RT bronchodilator orders with one order with BID Frequency and one order with Frequency of every 4 hours PRN wheezing or increased work of breathing using Per Protocol order mode. 7-10 - enter or revise RT Bronchodilator order(s) to two equivalent RT bronchodilator orders with one order with TID Frequency and one order with Frequency of every 4 hours PRN wheezing or increased work of breathing using Per Protocol order mode. 11-13 - enter or revise RT Bronchodilator order(s) to one equivalent RT bronchodilator order with QID Frequency and an Albuterol order with Frequency of every 4 hours PRN wheezing or increased work of breathing using Per Protocol order mode. Greater than 13 - enter or revise RT Bronchodilator order(s) to one equivalent RT bronchodilator order with every 4 hours Frequency and an Albuterol order with Frequency of every 2 hours PRN wheezing or increased work of breathing using Per Protocol order mode. RT to enter RT Home Evaluation for COPD & MDI Assessment order using Per Protocol order mode.     Electronically signed by Wendy Paz RCP on 1/24/2022 at 12:36 PM

## 2022-01-24 NOTE — PROGRESS NOTES
Pt taken off bi-pap and placed on 5L nasal cannula, which is what pt arrived on from floor. Pt tolerating well, respiratory remains bedside with pt.

## 2022-01-24 NOTE — PROGRESS NOTES
Dr. Camden Cortes called and updated on pt status, MD states it is ok to transfer pt back to floor at this time. 4T called and notified that pt is being transferred out of PACU and to room.

## 2022-01-24 NOTE — PROGRESS NOTES
Occupational Therapy   Occupational Therapy Initial Assessment  Date: 2022   Patient Name: Vipul Alfonso  MRN: [id number]     : 1945    Date of Service: 2022    Discharge Recommendations:Linda Ulrich scored a  on the AM-PAC ADL Inpatient form. Current research shows that an AM-PAC score of 17 or less is typically not associated with a discharge to the patient's home setting. Based on the patient's AM-PAC score and their current ADL deficits, it is recommended that the patient have 3-5 sessions per week of Occupational Therapy at d/c to increase the patient's independence. Please see assessment section for further patient specific details. If patient discharges prior to next session this note will serve as a discharge summary. Please see below for the latest assessment towards goals. 3-5 sessions per week  OT Equipment Recommendations  Equipment Needed: No    Assessment   Performance deficits / Impairments: Decreased functional mobility ; Decreased ADL status; Decreased strength;Decreased endurance;Decreased safe awareness;Decreased high-level IADLs;Decreased balance  Assessment: Pt presenting below her baseline with the above deficits associated with hyperbillirubenemia. Pt requires Mod -Max A with mobility and ADLs at prior level. She is primarily limited by generalized weakness and deconditioning. Continued OT indicated in order to address the above deficits.   Treatment Diagnosis: above deficits associated with hyperbillirubenemia  Prognosis: Good  Decision Making: Low Complexity  Exam: as above  Assistance / Modification: Maxi-move--No STEDY due to poor trunk stability  REQUIRES OT FOLLOW UP: Yes  Activity Tolerance  Activity Tolerance: Patient limited by fatigue;Patient Tolerated treatment well  Activity Tolerance: Pt significantly limited by generalized weakness and deconditioning  Safety Devices  Safety Devices in place: Yes  Type of devices: Left in bed;Nurse notified  Restraints  Initially in place: No           Patient Diagnosis(es): The primary encounter diagnosis was Jaundice, non-. Diagnoses of Transaminitis, Hyperbilirubinemia, and Pancreatic mass were also pertinent to this visit. has a past medical history of Aortic aneurysm ([facility] [address] Arthritis, Buerger's disease ([address] Cancer of vulva ([facility] [address] Cataract, COPD (chronic obstructive pulmonary disease) ([facility] [address] Emphysema (subcutaneous) (surgical) resulting from a procedure, History of radiation therapy, Hypertension, Lung bullae ([facility] [address] and Periph vascular dis. has a past surgical history that includes Breast surgery; Knee arthroscopy; Vulvectomy (); other surgical history (3-); eye surgery; knee surgery (9/1/10); vascular surgery; Breast lumpectomy (Right, 14); other surgical history; other surgical history (Left, 2/16/15); joint replacement (Bilateral); bronchoscopy (2018); and pr thorsc dx lungs/pericar/med/pleural space w/o bx (Left, 2018). Treatment Diagnosis: above deficits associated with hyperbillirubenemia      Restrictions  Position Activity Restriction  Other position/activity restrictions: 77 y.o. female who presents to the emergency department today for evaluation for an abnormal laboratory test.  She was sent in for an abnormal laboratory test including elevated liver enzymes. The patient tells me that she always has abdominal pain, cramping however she has had a decrease in appetite since Friday and she states that she is noticing increasing cramping to her upper abdomen which has been ongoing since Friday as well. The patient states that her pain seems to wax and wane on its own, and she otherwise denies any known alleviating or aggravating factors. Patient states she is nauseous and has not had much of an appetite although denies any vomiting or diarrhea. The patient denies any fever or chills. She is no cough or congestion.   She denies any chest pain or Transportation: Car  Leisure & Hobbies: Gardening  Additional Comments: Pt reports multiple falls within the year. Sleeps in a flat bed. Daugther provides assist with transportation. Objective   Vision: Within Functional Limits  Hearing: Within functional limits    Orientation  Overall Orientation Status: Impaired  Orientation Level: Disoriented to place;Oriented to situation;Oriented to person  Observation/Palpation  Posture: Poor  Observation: kyphotic and presents with generalized weakness. Pure wick in place. Balance  Sitting Balance: Maximum assistance (Max A for upright sitting in increased cues to use bedrails for UE support.)  Standing Balance: Maximum assistance  Standing Balance  Time: <30 sec  Activity: Max A for sit>stand at EOB. Functional Mobility  Functional Mobility Comments: Unable to assess. Pt declines mobility activities this session. Toilet Transfers  Toilet Transfer: Unable to assess  ADL  LE Dressing: Dependent/Total (total dependent to yusef socks in sitting at EOB with Max A for sitting balance.)  Toileting: Dependent/Total (pt having difficulty with bladder sensation. Used pure wick for dependent urination.)  Tone RUE  RUE Tone: Normotonic  Tone LUE  LUE Tone: Normotonic  Coordination  Movements Are Fluid And Coordinated: Yes     Bed mobility  Rolling to Right: Maximum assistance  Supine to Sit: 2 Person assistance;Maximum assistance  Sit to Supine: 2 Person assistance;Maximum assistance  Scooting: Dependent/Total;2 Person assistance  Transfers  Sit to stand: 2 Person assistance;Maximum assistance  Stand to sit: Maximum assistance;2 Person assistance  Transfer Comments: Max A x2 for sit<>stand at EOB. Pt declines to transfer from EOB to recliner stating, \"I just dont feel like I can do it right now\"  Pt on 2L O2 nasal canula throughout bed mobility and sit<>stand.       Cognition  Overall Cognitive Status: Exceptions  Arousal/Alertness: Appropriate responses to stimuli  Following Commands: Follows all commands without difficulty  Attention Span: Appears intact  Insights: Decreased awareness of deficits  Initiation: Does not require cues  Sequencing: Does not require cues  Cognition Comment: Pt is slightly disoriented at this time.   Perception  Overall Perceptual Status: WFL     Sensation  Overall Sensation Status: WFL           LUE Strength  L Shoulder Flex: 3-/5  L Shoulder ABduction: 3-/5  L Hand General: 3-/5  RUE Strength  R Shoulder Flex: 3-/5  R Shoulder ABduction: 3-/5  R Hand General: 3-/5         Plan   Plan  Times per week: 3-5  Times per day: Daily  Current Treatment Recommendations: Strengthening,Functional Mobility Training,Wheelchair Mobility Training,Balance Training,Safety Education & Training,Endurance Training,Self-Care / ADL      AM-PAC Score        AM-[facility] Inpatient Daily Activity Raw Score: 8 (01/24/22 1006)  AM-PAC Inpatient ADL T-Scale Score : 22.86 (01/24/22 1006)  ADL Inpatient CMS 0-100% Score: 85.69 (01/24/22 1006)  ADL Inpatient CMS G-Code Modifier : CM (01/24/22 1006)    Goals  Short term goals  Time Frame for Short term goals: Discharge  Short term goal 1: Toileting ADLs at toilet (BSC as needed) with Max A x1  Short term goal 2: LB dressing Max A  Short term goal 3: UB dressing Mod A  Short term goal 4: Funcitonal transfers Max A x1  Short term goal 5: Bed mobility Max A x1  Patient Goals   Patient goals : Return to home       Therapy Time   Individual Concurrent Group Co-treatment   Time In 0925         Time Out 1004         Minutes 39           Timed Code Treatment Minutes:   24 minutes    Total Treatment Minutes:  39 minutes     DANIELLE Botello OTR/L  [id number]

## 2022-01-24 NOTE — PROGRESS NOTES
This RN transferred patient to room.  Bedside handoff with [facility] RN    Electronically signed by Duarte Munoz RN on 1/24/2022 at 4124

## 2022-01-24 NOTE — PROCEDURES
ampulla of Vater    And we then were able to attempt to do cannulation    Cholangiogram: With our sphincterotome and a good amount of effort we were able after about 25 minutes of trying to get a guidewire to go up into the common bile duct injection of dye reveals dilated intrahepatic biliary system and a dilated common bile system    With a stricture in the pancreatic head    Because the patient recently was on a blood thinning medication we did not make a sphincterotomy we just went ahead and gently placed in a 6 cm x 10 mm fully covered biliary Wallstent    We released the Wallstent will we did black-colored bile mixed with some small amounts of blood did come out of the common bile duct    You could see that the patient had a significant stricture    And at this point we then terminated the procedure    Tolerated procedure well we did not need to give antibiotics as she was on antibiotics in her medical luke we did give her indomethacin suppository    Pancreatogram: Not done     the cannulas were then withdrawn. The duodenum and stomach were decompressed and the scope was withdrawn from the patient. The patient tolerated the procedure well and was taken to the post anesthesia care unit in good condition. Radiological Interpretation:  All fluoroscopic images and  still x-ray films were carefullly examined and interpreted by the endoscopist on the spot during the procedure in the absence of radiologist.  These interpretations guided the course of the procedure and the interventions mentioned above and below.         Impression: Patient has a pancreatic mass lesion eroding into the duodenum creating ulcer and bleeding    This was successfully stented today using a biliary Wallstent fully covered        Recommendations: N.p.o. for the next 4 hours  Then a liquid diet  Patient needs continuous proton pump inhibitor due to the bleeding and the ulceration from the tumor    We need to await the biopsy results    Patient needs to have a CA-19-9 check if not already    Patient needs a general surgery consult for potential Whipple and an oncology consult to treat a pancreatic carcinoma    Thank you for this very kind referral today      Veronica Trejo MD  [address] and [address]  1/24/2022

## 2022-01-25 NOTE — CONSULTS
[facility]   CONSULT NOTE    1/25/2022 2:15 PM    Patient Information: Barrett Roach   Date of Admit:  1/23/2022  Primary Care Physician:  Litzy Amaya MD  Requesting Physician:  Michaela Hinton MD    Reason for consult:   Evaluation and recommendations for pancreatic mass. Chief complaint:    Chief Complaint   Patient presents with    Abnormal Lab     Patient in by squad from home with elevated liver enzymes and abd pain. History of Present Illness:  Barrett Roach is a 77 y.o. female on Michaela Hitnon MD service who was admitted on 1/23/2022 for abdominal pain and jaundice. She reports decreased appetite and weight loss. CT of the abdomen and pelvis found 4.1 cm low-density mass in the pancreatic head with severe intra and extrahepatic biliary ductal dilation secondary to the mass. There are several liver lesion and a 14 mm indeterminate adrenal lesion. She had EUS/ERCP with biopsy and stent placement on 1/24/2022, pathology is pending. Pancreatic mass lesion eroding into duodenum creating an ulcer and bleeding. She is a patient of Dr. Sarah Tovar with a history of lung, breast and vulvar cancer. Status post left video-assisted thoracoscopy, wedge resection of left upper lobe nodule, bronchoscopy, and intercostal nerve block by Dr. Marysol Maria on 11/2/2018 for lung cancer, she did not require adjuvant therapy. Status post right lumpectomy and a sentinel lymph node dissection 2/2014 followed by 5 years of aromatase inhibitor therapy. She had radical vulvectomy with left inguinal lymph node dissection in 11/2008. She receives Xgeva every 6 months at [facility] for osteopenia, last given 5/24/2021, she has not returned since. She states she does not walk due to a previous back injury. She lives with her daughter who takes care of her and drives her to her doctor appointments.         Past Medical History:     has a past medical history of Aortic aneurysm ([address] Arthritis, Buerger's disease ([address] Cancer of vulva ([address] Cataract, COPD (chronic obstructive pulmonary disease) ([address] Emphysema (subcutaneous) (surgical) resulting from a procedure, History of radiation therapy, Hypertension, Lung bullae ([address] and Periph vascular dis. Past Surgical History:    Past Surgical History:   Procedure Laterality Date    BREAST LUMPECTOMY Right 2/6/14    BREAST SURGERY      tumors removed    BRONCHOSCOPY  01/25/2018    BAL    ERCP N/A 1/24/2022    ERCP STENT INSERTION performed by Kana Poole MD at [facility]    JOINT REPLACEMENT Bilateral     right and left KNEE    KNEE ARTHROSCOPY      KNEE SURGERY  9/1/10    REMOVAL OF RETAINED ANTIBIOTIC SPACERS,LEFT KNEE DEBRIDEMENT ANSD SYNOVECTOMY WITH FROZEN SECTION. LEFT KNEE PLACEMENT OF ANTIBIOTIC SPACER LEFT KNEE    OTHER SURGICAL HISTORY  3-2010    subclavian stent left    OTHER SURGICAL HISTORY      stents bilateral femoral arteries    OTHER SURGICAL HISTORY Left 2/16/15    excision of left vulva lesion    OK THORSC DX LUNGS/PERICAR/MED/PLEURAL SPACE W/O BX Left 11/2/2018    LEFT VIDEO ASSISTED THORACOSCOPY, WEDGE RESECTION, MEDIASTINAL LYMPH NODE DISSECTION performed by Sameera Davis MD at [address] N/A 1/24/2022    EGD W/EUS FNA performed by Kana Poole MD at [address]      stent each groin    VULVECTOMY      History of radical vulvectomy with a left inguinal lymph node dissection November 2008.         Current Medications:     ipratropium-albuterol  1 ampule Inhalation TID    cefepime  2,000 mg IntraVENous Q12H    amLODIPine  5 mg Oral Daily    DULoxetine  30 mg Oral Daily    budesonide-formoterol  2 puff Inhalation BID    levothyroxine  50 mcg Oral Daily    pregabalin  150 mg Oral BID    sodium chloride flush  5-40 mL IntraVENous 2 times per day    nicotine  1 patch TransDERmal Daily    LORazepam  1 mg Oral Nightly    metroNIDAZOLE  500 mg IntraVENous Q8H       Allergies: Allergies   Allergen Reactions    Codeine Hives    Penicillins Hives        Social History:    reports that she has been smoking cigarettes. She has a 114.00 pack-year smoking history. She has never used smokeless tobacco. She reports current drug use. She reports that she does not drink alcohol. Family History:     family history includes Breast Cancer in her maternal aunt; Cancer in her mother; Heart Disease in her brother; Stroke in her sister. ROS:      Constitutional:  No fever, No chills, No night sweats. +weight loss   Eyes:  No impairment or change in vision  ENT / Mouth:  No pain, abnormal ulceration, bleeding, nasal drip or change in voice or hearing  Cardiovascular:  No chest pain, palpitations, new edema, or calf discomfort  Respiratory:  No pain, hemoptysis, change to breathing  Gastrointestinal:  No cramping, jaundice, change to eating and bowel habits.  +Abd pain  Urinary:  No pain, bleeding or change in continence  Musculoskeletal:  No redness, pain, edema or weakness  Skin:  No pruritus, rash, change to nodules or lesions  Neurologic:  No discomfort, change in mental status, speech, sensory or motor activity  Psychiatric:  No change in concentration or change to affect or mood  Endocrine:  No hot flashes, increased thirst, or change to urine production  Hematologic: No petechiae, ecchymosis or bleeding  Lymphatic:  No lymphadenopathy or lymphedema  Allergy / Immunologic:  No eczema, hives, frequent or recurrent infections    PHYSICAL EXAM:    Vitals:  Vitals:    01/25/22 1200   BP: 132/78   Pulse: 83   Resp: 18   Temp: 97.8 °F (36.6 °C)   SpO2: 95%        Intake/Output Summary (Last 24 hours) at 1/25/2022 1415  Last data filed at 1/25/2022 1404  Gross per 24 hour   Intake 2825.57 ml   Output --   Net 2825.57 ml      Wt Readings from Last 3 Encounters:   01/25/22 163 lb 3.2 oz (74 kg)   09/28/21 160 lb (72.6 kg)   04/05/21 148 lb (67.1 kg)        General appearance: Jaundice  Eyes: Pupils equal. Scleral icterus. ENT: Moist mucus membranes, no thrush  Neck: Trachea midline, symmetrical  Cardiovascular: Regular rhythm, normal S1, S2. No murmur, gallop, rub. No edema in  lower extremities  Respiratory: Clear to auscultation bilaterally. No wheeze. Good inspiratory effort  Gastrointestinal: Abdomen soft, not tender, not distended, normal bowel sounds  Musculoskeletal: No cyanosis in digits, warm extremities  Neurologic: Cranial nerves grossly intact, no motor or speech deficits. Psychiatric: Normal affect. Alert and oriented to time, place and person.   Skin: Warm, dry, normal turgor, no rash    DATA:  CBC:   Lab Results   Component Value Date    WBC 14.5 01/25/2022    RBC 3.63 01/25/2022    RBC 4.43 01/27/2017    HGB 11.1 01/25/2022    HCT 36.1 01/25/2022    MCV 99.5 01/25/2022    MCH 30.5 01/25/2022    MCHC 30.6 01/25/2022    RDW 19.1 01/25/2022     01/25/2022    MPV 12.0 01/25/2022     BMP:  Lab Results   Component Value Date     01/25/2022    K 6.2 01/25/2022    K 5.5 01/24/2022     01/25/2022    CO2 11 01/25/2022    BUN 35 01/25/2022    CREATININE 0.8 01/25/2022    CALCIUM 9.0 01/25/2022    GFRAA >60 01/25/2022    GFRAA >60 06/30/2012    LABGLOM >60 01/25/2022    GLUCOSE 192 01/25/2022     Magnesium:   Lab Results   Component Value Date    MG 1.80 11/03/2018     LIVER PROFILE:   Recent Labs     01/23/22  1802 01/24/22  0424 01/25/22  0844   * 619* 542*   * 522* 459*   LIPASE 62.0* 18.0  --    BILIDIR 9.6* >10.0* 7.7*   BILITOT 14.0* 13.8* 10.3*   ALKPHOS 1,780* 1,746* 1,657*     PT/INR:    Lab Results   Component Value Date    PROTIME 15.6 01/24/2022    PROTIME 16.3 01/23/2022    PROTIME 14.3 04/05/2021    PROTIME 13.5 06/07/2011    INR 1.36 01/24/2022    INR 1.42 01/23/2022    INR 1.23 04/05/2021     IMAGING:    Narrative   EXAMINATION:   ONE XRAY VIEW OF THE CHEST       1/23/2022 5:46 pm       COMPARISON:   None.       HISTORY: ORDERING SYSTEM PROVIDED HISTORY: abd pielin   TECHNOLOGIST PROVIDED HISTORY:   Reason for exam:->abd pian   Reason for Exam: abd pain       FINDINGS:   The heart is normal.  The pulmonary vessels are engorged centrally.  There   are some hazy interstitial opacities throughout both lungs which is more   prominent on the left.  No effusion is seen. Nelson Glimpse is a questionable small   hiatal hernia along the lower mediastinum.  The aorta is ectatic.           Impression   Hazy interstitial opacities throughout both lungs which is more prominent on   the left and is increased.  This could represent early multisegmental   interstitial pneumonitis.  Recommend follow-up       Questionable small hiatal hernia.         Narrative   EXAMINATION:   CT OF THE HEAD WITHOUT CONTRAST  1/23/2022 7:40 pm       TECHNIQUE:   CT of the head was performed without the administration of intravenous   contrast. Dose modulation, iterative reconstruction, and/or weight based   adjustment of the mA/kV was utilized to reduce the radiation dose to as low   as reasonably achievable.       COMPARISON:   None.       HISTORY:   ORDERING SYSTEM PROVIDED HISTORY: fall   TECHNOLOGIST PROVIDED HISTORY:   Reason for exam:->fall   Has a \"code stroke\" or \"stroke alert\" been called? ->No   Decision Support Exception - unselect if not a suspected or confirmed   emergency medical condition->Emergency Medical Condition (MA)   Reason for Exam: Abnormal Lab (Patient in by squad from home with elevated   liver enzymes and abd pain. )       FINDINGS:   BRAIN/VENTRICLES: The ventricles are borderline enlarged and there is diffuse   mild prominence of the cortical sulci.  There is mild periventricular low   density bilaterally.  No intracranial hemorrhage or edema is seen. Nelson Glimpse is   no extra-axial fluid collection or mass.       ORBITS: The visualized portion of the orbits demonstrate no acute abnormality.       SINUSES: There is moderate mucosal thickening throughout the ethmoid and   maxillary sinuses.  The mastoid air cells demonstrate no acute abnormality.       SOFT TISSUES/SKULL:  No acute abnormality of the visualized skull or soft   tissues.           Impression   Mild atrophy and mild chronic microischemic disease scattered in the deep   white matter which is unchanged with no acute abnormality seen.       Chronic sinusitis which is more prominent.         Narrative   EXAMINATION:   CT OF THE CERVICAL SPINE WITHOUT CONTRAST 1/23/2022 7:40 pm       TECHNIQUE:   CT of the cervical spine was performed without the administration of   intravenous contrast. Multiplanar reformatted images are provided for review. Dose modulation, iterative reconstruction, and/or weight based adjustment of   the mA/kV was utilized to reduce the radiation dose to as low as reasonably   achievable.       COMPARISON:   04/05/2021       HISTORY:   ORDERING SYSTEM PROVIDED HISTORY: fall   TECHNOLOGIST PROVIDED HISTORY:   Reason for exam:->fall   Decision Support Exception - unselect if not a suspected or confirmed   emergency medical condition->Emergency Medical Condition (MA)   Reason for Exam: Abnormal Lab (Patient in by squad from home with elevated   liver enzymes and abd pain. )       FINDINGS:   BONES/ALIGNMENT: There is moderately severe disc space narrowing throughout   the lower cervical spine with prominent osteophytes throughout which is   unchanged.  There is minimal subluxation of C4 on C5 which is unchanged.    There is mild subluxation of C7 on T1 which is unchanged.  There are pedicle   screws extending through the C7 and T1 vertebra which are held in place with   surgical rods and is unchanged.  No fracture is seen.  The atlantoaxial joint   is intact.  There are subchondral cystic or erosive changes along the base of   the odontoid process posteriorly which is unchanged.       DEGENERATIVE CHANGES: There is mild lucency around the pedicle screw of C7   which is  COPDArthuro Laura atherosclerosis of the   distal descending thoracic aorta with a chronic 5.5 cm fusiform aneurysm as   measured on coronal image 74 not significantly changed.       Organs: Severe diffuse intrahepatic biliary ductal dilatation.  The common   duct is distended measuring up to approximately 2.5 cm on coronal image 65. There is abrupt narrowing in the head of the pancreas where there appears to   be a low-density mass measuring approximately 4.1 x 3.2 cm on axial image 91   new from 04/05/2021.  8 mm ovoid nonspecific low-density lesion in the left   hepatic lobe on axial image 46 new from 04/05/2021.  Similar appearing 11 mm   low-density lesion in the right hepatic lobe on axial image 73 also new.  8   mm low-density lesion in the right hepatic lobe on axial image 52.  Sludge   versus small gallstones.  Mild gallbladder wall thickening and   pericholecystic stranding.  No pancreatic ductal dilatation.  The body and   tail are normal in appearance.       The spleen and left adrenal gland are unremarkable.  14 mm indeterminate   right adrenal nodule new or mildly increased in size from 04/05/2021. Chronic cortical scarring in the midpole of the right kidney.  Small   bilateral nonobstructing renal calculi.  No obstructive uropathy.       GI/Bowel: Colonic diverticulosis without evidence of acute diverticulitis. Normal appendix.  No obstruction or wall thickening identified.       Pelvis:  The urinary bladder is normal in appearance.  The uterus and   bilateral adnexae are unremarkable.  No free fluid.  No pelvic or inguinal   lymphadenopathy.       Peritoneum/Retroperitoneum: The abdominal aorta is normal in caliber with   moderate to severe atherosclerosis.  Bilateral iliac stents are in place.  No   retroperitoneal or mesenteric lymphadenopathy is identified.  No free air or   fluid is seen in the abdomen.  The splenic and portal veins are patent.  The   hepatic veins are grossly patent.  The thoracoscopy, wedge resection of left upper lobe nodule, bronchoscopy, and intercostal nerve block by Dr. Kim Rossi on 11/2/2018. 4. History of breast cancer:  -Status post right lumpectomy and a sentinel lymph node dissection 2/2014 followed by 5 years of aromatase inhibitor therapy. 5. History of vulvar cancer:  -Radical vulvectomy with left inguinal lymph node dissection in 11/2008. This plan was discussed with the patient and he/she verbalized understanding. Thank you for allowing us to participate in the care of this patient. Micheal Forte, [address]  [phone]      Agree with the above note. High suspicion of metastatic pancreatic cancer with mets to the liver. Pathology from ERCP pending. Will follow up on results.     Bisi Petersen MD  [facility]

## 2022-01-25 NOTE — PROGRESS NOTES
Facility/Department: [address] ORTHO/NEURO NURSING  Initial Assessment  DYSPHAGIA BEDSIDE SWALLOW EVALUATION     Patient: Stephen Duenas   : 1945   MRN: [id number]      Evaluation Date: 2022   Admitting Diagnosis: Hyperbilirubinemia [E80.6]  Obstructive jaundice [K83.1]  Transaminitis [R74.01]  Pancreatic mass [K86.89]  Jaundice, non- [R17]  Pain: Pt did not report pain                        H&P: Stephen Duenas is a 77 y.o. female history of breast cancer, hypertension, DVT, wheelchair-bound, and COPD on 2 L nasal cannula at night. Patient presents the emergency room for abdominal pain. She states her abdominal pain started approximately 10 days ago it is more epigastric pain. She states her pain is 10 out of 10. Abdominal pain did get significantly worse yesterday. She did get relief from IV pain medicine in the emergency room. She noticed skin color changes around the same time the abdominal pain. She has had decreased appetite and has not eaten in the last 3 days. When she does try to eat it causes pain. She does have some nausea but no vomiting. Her last bowel movement was yesterday. She has noted that her urine is dark orange to rust color. She does report a temperature of 100 yesterday. In the emergency room she is afebrile. Chest xray:   Hazy interstitial opacities throughout both lungs which is more prominent on   the left and is increased.  This could represent early multisegmental   interstitial pneumonitis.  Recommend follow-up       Questionable small hiatal hernia. Head CT:   Mild atrophy and mild chronic microischemic disease scattered in the deep   white matter which is unchanged with no acute abnormality seen.       Chronic sinusitis which is more prominent.      Modified Barium Swallow Study: None per chart      History/Prior Level of Function:   Living Status: Pt in from home, her daughter lives with her   Prior Dysphagia History: None reported     Dysphagia Impressions/Diagnosis: Minimal Oropharyngeal Dysphagia   Pt was seen sitting upright in bed consuming her breakfast meal of clear liquids. Pt currently on 3L O2 via nasal cannula, Pt reported she wears 2L at home at night only. Pt reported no difficulty with swallowing however stated she takes her time with eating meats and bread. Evaluation was limited at Pt is currently on a clear liquid diet per MD. Minimal oropharyngeal dysphagia assessed with suspected premature bolus loss to pharynx and mildly delayed swallow initiation. Laryngeal elevation felt upon manual palpation. Adequate oral manipulation and clearing noted with jello. At this time, recommend continuation of clear liquid diet with use of general safe swallowing strategies. Will follow up 1-2x to ensure diet tolerance and to assess for tolerance of increased textures as diet can be advanced. Recommended Diet and Intervention 1/25/2022:  Diet Solids Recommendation:  Clear liquid diet per MD Liquid Consistency Recommendation: Thin liquids  Recommended form of Meds:   Meds as tolerated      Compensatory Swallowing Strategies:  Upright as possible with all PO intake , Small bites/sips , Remain upright 30-45 min     SHORT TERM DYSPHAGIA GOALS/PLAN OF CARE: Speech therapy for dysphagia tx 1-2 times to ensure diet tolerance.   Pt will functionally tolerate recommended diet with no overt clinical s/s of aspiration  Pt will functionally tolerate ongoing assessment of swallow function with diet to be determined as indicated     Dysphagia Therapeutic Intervention:  Diet Tolerance Monitoring , Patient/Family Education     Discharge Recommendations: Do not anticipate need for further speech/dysphagia therapy upon discharge from hospital     Patient Positioning: Upright in bed    Current Diet Level (prior to evaluation): Clear liquids     Respiratory Status:   []Room Air   [x]O2 via nasal cannula   []Other:    Dentition:  []Adequate  []Dentures   [x]Missing Many Teeth  []Edentulous  []Other:    Baseline Vocal Quality:  [x]Normal  []Dysphonic   []Aphonic   []Hoarse  []Wet  []Weak  []Other:    Volitional Swallow:   []Absent   []Delayed     [x]Adequate     []Required use of drink     Oral Mechanism Exam:  [x]WFL []Mild   [] Moderate  []Severe  []To be assessed  Impaired:   []Left side      []Right side    []Labial ROM/Coordination    []Labial Symmetry   []Lingual ROM/Coordination   []Lingual Symmetry  []Gag  []Other:     Oral Phase: [x]WFL []Mild   [] Moderate  []Severe  []To be assessed   []Impaired/Prolonged Mastication:   []Spillage Left:   []Spillage Right:  []Pocketing Left:   []Pocketing Right:   []Decreased Anterior to Posterior Transit:   []Suspected Premature Bolus Loss:   []Lingual/Palatal Residue:   []Other:     Pharyngeal Phase: []WFL [x]Mild   [] Moderate  []Severe  []To be assessed   [x]Delayed Swallow:   [x]Suspected Pharyngeal Pooling:   []Decreased Laryngeal Elevation:   []Absent Swallow:  []Wet Vocal Quality:   []Throat Clearing-Immediate:   []Throat Clearing-Delayed:   []Cough-Immediate:   []Cough-Delayed:  []Change in Vital Signs:  []Suspected Delayed Pharyngeal Clearing:  []Other:       Eating Assistance:  []Independent  [x]Setup or clean-up assistance   [] Supervision or touching assistance   [] Partial or moderate assistance   [] Substantial or maximal assistance  [] Dependent       EDUCATION:   Provided education regarding role of SLP, results of assessment, recommendations and general speech pathology plan of care. [x] Pt verbalized understanding and agreement   [] Pt requires ongoing learning   [] No evidence of comprehension     If patient discharges prior to next visit, this note will serve as discharge.      Timed Code Minutes:  0 minutes  Total Treatment Minutes: 25 minutes     Electronically signed by:   Jillian Cesar M.A., [address] [place] [address]  Speech-Language Pathologist

## 2022-01-25 NOTE — PROGRESS NOTES
Physical Therapy  Facility/Department: [address] ORTHO/NEURO NURSING  Daily Treatment Note  NAME: Rasta Richard  : 1945  MRN: [id number]    Date of Service: 2022    Discharge Recommendations:Linda Peck scored a  on the AM-PAC short mobility form. Current research shows that an AM-PAC score of 17 or less is typically not associated with a discharge to the patient's home setting. Based on the patient's AM-PAC score and their current functional mobility deficits, it is recommended that the patient have 3-5 sessions per week of Physical Therapy at d/c to increase the patient's independence. Please see assessment section for further patient specific details. If patient discharges prior to next session this note will serve as a discharge summary. Please see below for the latest assessment towards goals. PT Equipment Recommendations  Equipment Needed: No    Assessment   Body structures, Functions, Activity limitations: Decreased functional mobility ; Decreased ADL status; Decreased ROM; Decreased strength;Decreased endurance;Decreased balance; Increased pain;Decreased posture  Assessment: Pt presents as below her baseline function and would benefit from skilled PT services to promote safe return to PLOF. Prognosis: Fair  PT Education: Goals;PT Role;Plan of Care  Patient Education: D/C recommendations--pt verbalized understanding  REQUIRES PT FOLLOW UP: Yes  Activity Tolerance  Activity Tolerance: Patient limited by pain; Patient limited by endurance;Treatment limited secondary to agitation  Activity Tolerance: Pt initially refusing all OOB activity secondary to pain. However during session, pt's room phone ringing and pt answering, then deferring to therapist. This writer answering and male on other end of line asking for another pt by name (who has since D/C'd from facility). Pt telling therapist that individual on other line is speaking for her daughter and mixed up names.  However, PT expressing desire to confirm information and attempting to direct male on line to  phone. Pt becoming increasingly agitated at therapist and then refusing all participation in any further therapy services. RN and  informed. Patient Diagnosis(es): The primary encounter diagnosis was Jaundice, non-. Diagnoses of Transaminitis, Hyperbilirubinemia, and Pancreatic mass were also pertinent to this visit. has a past medical history of Aortic aneurysm ([facility] [address] Arthritis, Buerger's disease ([address] Cancer of vulva ([address] Cataract, COPD (chronic obstructive pulmonary disease) ([address] Emphysema (subcutaneous) (surgical) resulting from a procedure, History of radiation therapy, Hypertension, Lung bullae ([address] and Periph vascular dis. has a past surgical history that includes Breast surgery; Knee arthroscopy; Vulvectomy (); other surgical history (3-); eye surgery; knee surgery (9/1/10); vascular surgery; Breast lumpectomy (Right, 14); other surgical history; other surgical history (Left, 2/16/15); joint replacement (Bilateral); bronchoscopy (2018); pr thorsc dx lungs/pericar/med/pleural space w/o bx (Left, 2018); Upper gastrointestinal endoscopy (N/A, 2022); and ERCP (N/A, 2022). Restrictions  Restrictions/Precautions  Restrictions/Precautions: Fall Risk (High)  Required Braces or Orthoses?: No  Position Activity Restriction  Other position/activity restrictions: 77 y.o. female who presents to the emergency department today for evaluation for an abnormal laboratory test.  She was sent in for an abnormal laboratory test including elevated liver enzymes. The patient tells me that she always has abdominal pain, cramping however she has had a decrease in appetite since Friday and she states that she is noticing increasing cramping to her upper abdomen which has been ongoing since Friday as well.   The patient states that her pain seems to wax and wane on its own, and she otherwise denies any known alleviating or aggravating factors. Patient states she is nauseous and has not had much of an appetite although denies any vomiting or diarrhea. The patient denies any fever or chills. She is no cough or congestion. She denies any chest pain or shortness of breath. The patient states that she fell yesterday, and she states that her wheelchair slipped from behind her, and when lifting her up back into the chair she noticed some increasing pain to her ribs. Patient wears 2 L of oxygen as needed but seems to have been needing it more over the past couple of days. Denies any alcohol use. She does take Norco for chronic pain, she otherwise has no complaints. She did not hit her head. No loss of consciousness. No vomiting  Subjective   General  Chart Reviewed: Yes  Response To Previous Treatment: Patient with no complaints from previous session. Family / Caregiver Present: No  Subjective  Subjective: Pt agreeable to PT/OT treat, but adamantly refusing OOB, citing weakness and pain. General Comment  Comments: Pt supine in bed upon arrival.  Pain Screening  Patient Currently in Pain: Yes (RN present and providing IV pain medication upon PT arrival)  Pain Assessment  Pain Assessment: 0-10  Pain Level: 9  Vital Signs  Patient Currently in Pain: Yes (RN present and providing IV pain medication upon PT arrival)       Orientation  Orientation  Overall Orientation Status: Within Functional Limits  Cognition      Objective   Bed mobility  Supine to Sit: Moderate assistance;2 Person assistance  Sit to Supine: Minimal assistance  Comment: use of handrails and HOB elevated, increased time to complete;  Pt refusing assistance when returning to bed but requiring min A for LEs to return to supine  Transfers  Sit to Stand: Unable to assess (Pt refusing OOB mobility this date)  Ambulation  Ambulation?: No  Stairs/Curb  Stairs?: No     Balance  Posture: Poor  Sitting - Static:

## 2022-01-25 NOTE — PROGRESS NOTES
[facility] PROGRESS NOTE    1/25/2022 10:10 AM        Name: Joey Emanuel Admitted: 1/23/2022  Primary Care Provider: Ira Cortes MD (Tel: [phone])                        Subjective:  . No acute events overnight. Resting well. Pain control. Diet ok. Labs reviewed  -Still having some abdominal pain.   Appears jaundiced as well but improved  Reviewed interval ancillary notes    Current Medications  glucose (GLUTOSE) 40 % oral gel 15 g, PRN  dextrose 50 % IV solution, PRN  glucagon (rDNA) injection 1 mg, PRN  dextrose 5 % solution, PRN  ipratropium-albuterol (DUONEB) nebulizer solution 1 ampule, TID  albuterol sulfate  (90 Base) MCG/ACT inhaler 2 puff, Q4H PRN  HYDROmorphone (DILAUDID) injection 1 mg, Q4H PRN  ondansetron (ZOFRAN) injection 4 mg, Q6H PRN  pantoprazole (PROTONIX) 80 mg in sodium chloride 0.9 % 100 mL infusion, Continuous  cefepime (MAXIPIME) 2000 mg IVPB minibag, Q12H  amLODIPine (NORVASC) tablet 5 mg, Daily  bisacodyl (DULCOLAX) EC tablet 5 mg, Nightly PRN  DULoxetine (CYMBALTA) extended release capsule 30 mg, Daily  budesonide-formoterol (SYMBICORT) 160-4.5 MCG/ACT inhaler 2 puff, BID  levalbuterol (XOPENEX) nebulization 0.63 mg, Q6H PRN  levothyroxine (SYNTHROID) tablet 50 mcg, Daily  pregabalin (LYRICA) capsule 150 mg, BID  sodium chloride flush 0.9 % injection 5-40 mL, 2 times per day  sodium chloride flush 0.9 % injection 5-40 mL, PRN  0.9 % sodium chloride infusion, PRN  ondansetron (ZOFRAN-ODT) disintegrating tablet 4 mg, Q8H PRN  polyethylene glycol (GLYCOLAX) packet 17 g, Daily PRN  acetaminophen (TYLENOL) tablet 650 mg, Q6H PRN   Or  acetaminophen (TYLENOL) suppository 650 mg, Q6H PRN  nicotine (NICODERM CQ) 21 MG/24HR 1 patch, Daily  0.9 % sodium chloride infusion, Continuous  morphine (PF) injection 2 mg, Q3H PRN   Or  morphine injection 4 mg, Q3H PRN  LORazepam (ATIVAN) tablet 1 mg, Nightly  metronidazole (FLAGYL) 500 mg in NaCl 100 mL IVPB premix, Q8H        Objective:  BP (!) 163/94   Pulse 81   Temp 96.7 °F (35.9 °C) (Axillary)   Resp 12   Ht 5' 5\" (1.651 m)   Wt 159 lb (72.1 kg)   LMP 03/11/1991 (Approximate) Comment: menarche 7years old  SpO2 98%   BMI 26.46 kg/m²     Intake/Output Summary (Last 24 hours) at 1/25/2022 1010  Last data filed at 1/24/2022 1805  Gross per 24 hour   Intake 850 ml   Output --   Net 850 ml      Wt Readings from Last 3 Encounters:   01/24/22 159 lb (72.1 kg)   09/28/21 160 lb (72.6 kg)   04/05/21 148 lb (67.1 kg)       General appearance:  Appears comfortable  Eyes: Sclera icterus jaundice appearance. Pupils equal.  ENT: Moist oral mucosa. Trachea midline, no adenopathy. Cardiovascular: Regular rhythm, normal S1, S2. No murmur. No edema in lower extremities  Respiratory: Not using accessory muscles. Good inspiratory effort. Clear to auscultation bilaterally, no wheeze or crackles. GI: Abdomen soft, no tenderness, not distended, normal bowel sounds  Musculoskeletal: No cyanosis in digits, neck supple  Neurology: CN 2-12 grossly intact. No speech or motor deficits  Psych: Normal affect.  Alert and oriented in time, place and person  Skin: Warm, dry, normal turgor    Labs and Tests:  CBC:   Recent Labs     01/23/22 1802 01/24/22 0424 01/25/22  0858   WBC 20.7* 20.6* 14.5*   HGB 10.1* 9.7* 11.1*    203 189     BMP:    Recent Labs     01/23/22  1802 01/24/22  0424 01/24/22  1003   * 135* 137   K 5.1 5.5* 5.1   CL 97* 103 104   CO2 19* 20* 20*   BUN 21* 19 20   CREATININE <0.5* <0.5* 0.6   GLUCOSE 142* 135* 95     Hepatic:   Recent Labs     01/23/22 1802 01/24/22  0424 01/25/22  0844   * 619* 542*   * 522* 459*   BILITOT 14.0* 13.8* 10.3*   ALKPHOS 1,780* 1,403*  --        Discussed care with patient             Problem List  Active Problems:    Obstructive jaundice Jaundice, non-  Resolved Problems:    * No resolved hospital problems. *       Assessment & Plan:   1. Pancreatic lesion mass  -With obstructive jaundice.  -Total bilirubin LFT LFTs are trending  -Patient is status post EUS with ultrasound  -See GI note for the  -Patient is status post stent placement. We will continue to monitor  General surgery has been consulted for potential [facility] oncology consult  Further recs to follow    Diet: ADULT DIET;  Clear Liquid  Code:Full Code  DVT PPX lovenox       Jarad Light MD   2022 10:10 AM

## 2022-01-25 NOTE — PROGRESS NOTES
0815: Shift assessment completed. VSS, alert and oriented. Call light within reach. The care plan and education has been reviewed and mutually agreed upon with the patient.

## 2022-01-25 NOTE — PROGRESS NOTES
Occupational Therapy  Facility/Department: [address] ORTHO/NEURO NURSING  Daily Treatment Note  NAME: Christian Santiago  : 1945  MRN: [id number]    Date of Service: 2022    Discharge Recommendations: Christian Santiago scored a  on the AM-PAC ADL Inpatient form. Current research shows that an AM-PAC score of 17 or less is typically not associated with a discharge to the patient's home setting. Based on the patient's AM-PAC score and their current ADL deficits, it is recommended that the patient have 3-5 sessions per week of Occupational Therapy at d/c to increase the patient's independence. Please see assessment section for further patient specific details. If patient discharges prior to next session this note will serve as a discharge summary. Please see below for the latest assessment towards goals. 3-5 sessions per week       Assessment   Performance deficits / Impairments: Decreased functional mobility ; Decreased ADL status; Decreased strength;Decreased endurance;Decreased safe awareness;Decreased high-level IADLs;Decreased balance  Assessment: Pt presenting below her baseline with the above deficits associated with hyperbillirubenemia. Pt requires modAx2-Chantel for bed mobility and mod-max assist for sitting balance. She is primarily limited by generalized weakness and deconditioning. Significant emotional deregulation and irritability noted today. Continued OT indicated in order to address the above deficits.   Treatment Diagnosis: above deficits associated with hyperbillirubenemia  Prognosis: Good  Exam: as above  Assistance / Modification: Maxi-move--No STEDY due to poor trunk stability  OT Education: OT Role;Transfer Training;Plan of Care;ADL Adaptive Strategies  Patient Education: Pt educated on above, verbalized understanding but would benefit from continued education  Barriers to Learning: cognitive  REQUIRES OT FOLLOW UP: Yes  Activity Tolerance  Activity Tolerance: Treatment limited secondary to agitation  Activity Tolerance: Pt significantly limited by generalized weakness and deconditioning; treatment significantly limited d/t irritability and agitation  Safety Devices  Safety Devices in place: Yes  Type of devices: Left in bed;Nurse notified;Call light within reach; Bed alarm in place  Restraints  Initially in place: No         Patient Diagnosis(es): The primary encounter diagnosis was Jaundice, non-. Diagnoses of Transaminitis, Hyperbilirubinemia, and Pancreatic mass were also pertinent to this visit. has a past medical history of Aortic aneurysm ([facility] [address] Arthritis, Buerger's disease ([facility] [address] Cancer of vulva ([facility] [address] Cataract, COPD (chronic obstructive pulmonary disease) ([facility] [address] Emphysema (subcutaneous) (surgical) resulting from a procedure, History of radiation therapy, Hypertension, Lung bullae ([facility] [address] and Periph vascular dis. has a past surgical history that includes Breast surgery; Knee arthroscopy; Vulvectomy (); other surgical history (3-); eye surgery; knee surgery (9/1/10); vascular surgery; Breast lumpectomy (Right, 14); other surgical history; other surgical history (Left, 2/16/15); joint replacement (Bilateral); bronchoscopy (2018); pr thorsc dx lungs/pericar/med/pleural space w/o bx (Left, 2018); Upper gastrointestinal endoscopy (N/A, 2022); and ERCP (N/A, 2022). Restrictions  Restrictions/Precautions  Restrictions/Precautions: Fall Risk (High)  Required Braces or Orthoses?: No  Position Activity Restriction  Other position/activity restrictions: 77 y.o. female who presents to the emergency department today for evaluation for an abnormal laboratory test.  She was sent in for an abnormal laboratory test including elevated liver enzymes.   The patient tells me that she always has abdominal pain, cramping however she has had a decrease in appetite since Friday and she states that she is noticing increasing cramping to her upper abdomen which has been ongoing since Friday as well. The patient states that her pain seems to wax and wane on its own, and she otherwise denies any known alleviating or aggravating factors. Patient states she is nauseous and has not had much of an appetite although denies any vomiting or diarrhea. The patient denies any fever or chills. She is no cough or congestion. She denies any chest pain or shortness of breath. The patient states that she fell yesterday, and she states that her wheelchair slipped from behind her, and when lifting her up back into the chair she noticed some increasing pain to her ribs. Patient wears 2 L of oxygen as needed but seems to have been needing it more over the past couple of days. Denies any alcohol use. She does take Norco for chronic pain, she otherwise has no complaints. She did not hit her head. No loss of consciousness. No vomiting  Subjective   General  Chart Reviewed: Yes  Patient assessed for rehabilitation services?: Yes  Family / Caregiver Present: No  Diagnosis: Hyperbillirubinemia  Subjective  Subjective: Pt supine in bed upon arrival to therapy session. Pt endorses pain but does not rate, pt with RN present in room administering pain medication for pain management. Pt was initially agreeable to therapy, progression of significant irritability with mobility and stating \"I don't need you here. I do not want to do this. Do not touch me. \"  Pt left supine in bed at end of session      Orientation     Objective    ADL  Additional Comments: Pt was offered bathing, dressing, grooming tasks- pt declined all ADLs at this time.         Balance  Sitting Balance: Maximum assistance (fluctuating assist required throughout seated task- modA-maxA) - pt sat EOB ~12 minutes with poor core strength and stability; significant L lateral lean with mod-max assist for correction; significant irritability with static and dynamic sitting balance activities; pt declined completion of adl tasks in seated position   Standing Balance:  (unable to assess today secondary to agitation and irritability)  Functional Mobility  Functional Mobility Comments: unable to assess today secondary to agitation and irritability. Pt declines mobility activities this session. Bed mobility  Supine to Sit: Moderate assistance;2 Person assistance  Sit to Supine: Minimal assistance  Comment: HOB elevated, use of BUE hand rails; pt required Chantel for sit to supine with significantly increased time to complete, significant pain reported, pt declined assist from therapist for bed mobility with multiple failed attempts, therapist with CGA for increased safety progressing to Chantel for assist with legs, HOB elevated  Transfers  Transfer Comments: unable to assess today secondary to agitation and irritability; pt was offered [facility] and recliner chair, pt declined all transfer activities on this date. Cognition  Overall Cognitive Status: Exceptions  Arousal/Alertness: Appropriate responses to stimuli  Following Commands: Follows all commands without difficulty  Attention Span: Appears intact  Insights: Decreased awareness of deficits  Initiation: Does not require cues  Sequencing: Does not require cues  Cognition Comment: Pt with perseveration in conversation regarding daughter and \"the man who is with her\". Pt received phone call during session (from wrong number), significant emotional deregulation and increased irritability following phone call, reporting that it is \"probably my daughter on drugs. \" Significant shift in mood noted with perseveration regarding daughter.             Plan   Plan  Times per week: 3-5  Times per day: Daily  Current Treatment Recommendations: Strengthening,Functional Mobility Training,Wheelchair Mobility Training,Balance Training,Safety Education & Training,Endurance Training,Self-Care / ADL    AM-PAC Score        AM-[facility] Inpatient Daily Activity Raw Score: 12 (01/25/22 1128)  AM-PAC Inpatient ADL T-Scale Score : 30.6 (01/25/22 1128)  ADL Inpatient CMS 0-100% Score: 66.57 (01/25/22 1128)  ADL Inpatient CMS G-Code Modifier : CL (01/25/22 1128)    Goals  Short term goals  Time Frame for Short term goals: Discharge  Short term goal 1: Toileting ADLs at toilet [facility] as needed) with Max A x1 - ongoing 1/25  Short term goal 2: LB dressing Max A - ongoing 1/25  Short term goal 3: UB dressing Mod A - ongoing 1/25  Short term goal 4: Funcitonal transfers Max A x1 - ongoing 1/25  Short term goal 5: Bed mobility Max A x1 - ongoing, modAx2 1/25  Long term goals  Time Frame for Long term goals : LTG=STG  Patient Goals   Patient goals : Return to home       Therapy Time   Individual Concurrent Group Co-treatment   Time In       1052   Time Out       1120   Minutes       28   Timed Code Treatment Minutes: [address] [facility], OTR/L -- [id number]

## 2022-01-25 NOTE — PROGRESS NOTES
[facility]        Progress Note    Patient Name: Vipul Alfonso  MRN: [id number]  YOB: 1945  Date of Evaluation: 2022    Chief Complaint: Abdominal pain    Subjective:  No acute events overnight  Pain continues but is controlled  No complaints of nausea or vomiting  Resting in bed at this time      Vital Signs:  Patient Vitals for the past 24 hrs:   BP Temp Temp src Pulse Resp SpO2   22 1008 -- -- -- -- 16 94 %   22 0805 (!) 163/94 96.7 °F (35.9 °C) Axillary 81 12 98 %   22 0545 128/82 96.8 °F (36 °C) Axillary 77 18 98 %   22 2130 126/82 -- -- -- -- --   22 1930 102/65 97.6 °F (36.4 °C) Axillary 86 18 95 %   22 1900 100/68 97.7 °F (36.5 °C) Axillary 85 18 93 %   22 1830 116/75 97.7 °F (36.5 °C) Axillary 89 18 91 %   22 1805 -- -- -- 89 -- 95 %   22 1802 -- -- -- -- 20 --   22 1800 105/67 -- -- 90 22 96 %   22 1745 102/61 97.5 °F (36.4 °C) Infrared 91 23 98 %   22 1740 101/74 -- -- 93 24 95 %   22 1735 106/63 -- -- 96 23 93 %   22 1730 100/63 97.3 °F (36.3 °C) Infrared 104 24 97 %   22 1439 112/78 98.3 °F (36.8 °C) Temporal 98 20 98 %   22 1235 -- -- -- -- 16 90 %   22 1218 106/67 98.4 °F (36.9 °C) Oral 96 16 93 %      TEMPERATURE HISTORY 24H: Temp (24hrs), Av.4 °F (36.3 °C), Min:96.7 °F (35.9 °C), Max:98.4 °F (36.9 °C)    BLOOD PRESSURE HISTORY: Systolic (21YJK), DCL:612 , Min:100 , UWA:424    Diastolic (09JCI), QUV:18, Min:55, Max:100      Intake/Output:  I/O last 3 completed shifts: In: 950 [I.V.:850; IV Piggyback:100]  Out: -   No intake/output data recorded.   Drain/tube Output:       Physical Exam:  General: awake, alert, oriented to  person, place, time  Cardiovascular:  regular rate and rhythm and no murmur noted  Lungs: clear to auscultation  Abdomen: soft, non-distended, mild suprapubic and epigastric region tenderness only, bowel sounds present   Skin: jaundice noted    Labs:  CBC:    Recent Labs     01/23/22  1802 01/24/22  0424 01/25/22  0858   WBC 20.7* 20.6* 14.5*   HGB 10.1* 9.7* 11.1*   HCT 31.8* 30.5* 36.1    203 189     BMP:    Recent Labs     01/24/22  0424 01/24/22  1003 01/25/22  0858   * 137 138   K 5.5* 5.1 6.2*    104 106   CO2 20* 20* 11*   BUN 19 20 35*   CREATININE <0.5* 0.6 0.8   GLUCOSE 135* 95 192*     Hepatic:    Recent Labs     01/23/22  1802 01/24/22  0424 01/25/22  0844   * 619* 542*   * 522* 459*   BILITOT 14.0* 13.8* 10.3*   ALKPHOS 1,780* 1,746* 1,657*     Amylase:  No results found for: AMYLASE  Lipase:    Lab Results   Component Value Date    LIPASE 18.0 01/24/2022    LIPASE 62.0 01/23/2022      Mag:    Lab Results   Component Value Date    MG 1.80 11/03/2018     Phos:     Lab Results   Component Value Date    PHOS 4.3 01/24/2022    PHOS 4.9 01/24/2022      Coags:   Lab Results   Component Value Date    PROTIME 15.6 01/24/2022    PROTIME 13.5 06/07/2011    INR 1.36 01/24/2022    APTT 33.1 01/24/2022       Cultures:  Anaerobic culture  No results found for: LABANAE  Fungus stain  No results found for requested labs within last 30 days. Gram stain  No results found for requested labs within last 30 days. Organism  No results found for: [facility]  Surgical culture  No results found for: CXSURG  Blood culture 1  Results in Past 30 Days  Result Component Current Result Ref Range Previous Result Ref Range   Blood Culture, Routine No Growth to date. Any change in status will be called. (1/23/2022)  Not in Time Range      Blood culture 2  Results in Past 30 Days  Result Component Current Result Ref Range Previous Result Ref Range   Culture, Blood 2 No Growth to date. Any change in status will be called. (1/23/2022)  Not in Time Range      Fecal occult  No results found for requested labs within last 30 days.      GI bacterial pathogens by PCR  No results found for requested labs within last 30 days. C. difficile  No results found for requested labs within last 30 days. Urine culture  Lab Results   Component Value Date    LABURIN  01/23/2022     <50,000 CFU/ml mixed skin/urogenital angle. No further workup       Pathology:  No relevant pathology     Imaging:  I have personally reviewed the following films:    CT HEAD WO CONTRAST    Result Date: 1/23/2022  EXAMINATION: CT OF THE HEAD WITHOUT CONTRAST  1/23/2022 7:40 pm TECHNIQUE: CT of the head was performed without the administration of intravenous contrast. Dose modulation, iterative reconstruction, and/or weight based adjustment of the mA/kV was utilized to reduce the radiation dose to as low as reasonably achievable. COMPARISON: None. HISTORY: ORDERING SYSTEM PROVIDED HISTORY: fall TECHNOLOGIST PROVIDED HISTORY: Reason for exam:->fall Has a \"code stroke\" or \"stroke alert\" been called? ->No Decision Support Exception - unselect if not a suspected or confirmed emergency medical condition->Emergency Medical Condition (MA) Reason for Exam: Abnormal Lab (Patient in by squad from home with elevated liver enzymes and abd pain. ) FINDINGS: BRAIN/VENTRICLES: The ventricles are borderline enlarged and there is diffuse mild prominence of the cortical sulci. There is mild periventricular low density bilaterally. No intracranial hemorrhage or edema is seen. There is no extra-axial fluid collection or mass. ORBITS: The visualized portion of the orbits demonstrate no acute abnormality. SINUSES: There is moderate mucosal thickening throughout the ethmoid and maxillary sinuses. The mastoid air cells demonstrate no acute abnormality. SOFT TISSUES/SKULL:  No acute abnormality of the visualized skull or soft tissues. Mild atrophy and mild chronic microischemic disease scattered in the deep white matter which is unchanged with no acute abnormality seen. Chronic sinusitis which is more prominent.      CT CERVICAL SPINE WO CONTRAST    Result Date: 1/23/2022  EXAMINATION: CT OF THE CERVICAL SPINE WITHOUT CONTRAST 1/23/2022 7:40 pm TECHNIQUE: CT of the cervical spine was performed without the administration of intravenous contrast. Multiplanar reformatted images are provided for review. Dose modulation, iterative reconstruction, and/or weight based adjustment of the mA/kV was utilized to reduce the radiation dose to as low as reasonably achievable. COMPARISON: 04/05/2021 HISTORY: ORDERING SYSTEM PROVIDED HISTORY: fall TECHNOLOGIST PROVIDED HISTORY: Reason for exam:->fall Decision Support Exception - unselect if not a suspected or confirmed emergency medical condition->Emergency Medical Condition (MA) Reason for Exam: Abnormal Lab (Patient in by squad from home with elevated liver enzymes and abd pain. ) FINDINGS: BONES/ALIGNMENT: There is moderately severe disc space narrowing throughout the lower cervical spine with prominent osteophytes throughout which is unchanged. There is minimal subluxation of C4 on C5 which is unchanged. There is mild subluxation of C7 on T1 which is unchanged. There are pedicle screws extending through the C7 and T1 vertebra which are held in place with surgical rods and is unchanged. No fracture is seen. The atlantoaxial joint is intact. There are subchondral cystic or erosive changes along the base of the odontoid process posteriorly which is unchanged. DEGENERATIVE CHANGES: There is mild lucency around the pedicle screw of C7 which is unchanged. There are moderate disc osteophyte complexes at C5-6 and C6-7 causing moderate dural sac effacement. There are laminectomy defects of C7 and T1 which is unchanged. There is moderate facet hypertrophy and sclerosis throughout. SOFT TISSUES: There is no prevertebral soft tissue swelling. There are some hazy reticulonodular opacities along both upper lobes.      Moderate degenerative disc changes throughout the lower cervical spine with which are unchanged with no acute abnormality seen. Status post laminectomy and interbody fusion and C7-T1 which is unchanged. There is some lucency along the pedicle screws of C7 which is could indicate loosening of the screws which is unchanged. Recommend surgical follow-up Moderate disc osteophyte complexes at C5-6 and C6-7 which is unchanged. CT ABDOMEN PELVIS W IV CONTRAST Additional Contrast? None    Result Date: 1/23/2022  EXAMINATION: CT OF THE ABDOMEN AND PELVIS WITH CONTRAST 1/23/2022 7:41 pm TECHNIQUE: CT of the abdomen and pelvis was performed with the administration of intravenous contrast. Multiplanar reformatted images are provided for review. Dose modulation, iterative reconstruction, and/or weight based adjustment of the mA/kV was utilized to reduce the radiation dose to as low as reasonably achievable. COMPARISON: CT chest angiogram 09/07/2021. CT chest, abdomen, and pelvis 04/05/2021. HISTORY: ORDERING SYSTEM PROVIDED HISTORY: juancice r/o choledocolithiasis TECHNOLOGIST PROVIDED HISTORY: Reason for exam:->juancice r/o choledocolithiasis Additional Contrast?->None Decision Support Exception - unselect if not a suspected or confirmed emergency medical condition->Emergency Medical Condition (MA) Reason for Exam: Abnormal Lab (Patient in by squad from home with elevated liver enzymes and abd pain. ) FINDINGS: Lower Chest: Chronic ground-glass opacities in the lung bases not significantly changed from 09/07/2021. COPD. Severe atherosclerosis of the distal descending thoracic aorta with a chronic 5.5 cm fusiform aneurysm as measured on coronal image 74 not significantly changed. Organs: Severe diffuse intrahepatic biliary ductal dilatation. The common duct is distended measuring up to approximately 2.5 cm on coronal image 65.  There is abrupt narrowing in the head of the pancreas where there appears to be a low-density mass measuring approximately 4.1 x 3.2 cm on axial image 91 new from 04/05/2021.  8 mm ovoid nonspecific low-density lesion in the left hepatic lobe on axial image 46 new from 04/05/2021. Similar appearing 11 mm low-density lesion in the right hepatic lobe on axial image 73 also new. 8 mm low-density lesion in the right hepatic lobe on axial image 52. Sludge versus small gallstones. Mild gallbladder wall thickening and pericholecystic stranding. No pancreatic ductal dilatation. The body and tail are normal in appearance. The spleen and left adrenal gland are unremarkable. 14 mm indeterminate right adrenal nodule new or mildly increased in size from 04/05/2021. Chronic cortical scarring in the midpole of the right kidney. Small bilateral nonobstructing renal calculi. No obstructive uropathy. GI/Bowel: Colonic diverticulosis without evidence of acute diverticulitis. Normal appendix. No obstruction or wall thickening identified. Pelvis: The urinary bladder is normal in appearance. The uterus and bilateral adnexae are unremarkable. No free fluid. No pelvic or inguinal lymphadenopathy. Peritoneum/Retroperitoneum: The abdominal aorta is normal in caliber with moderate to severe atherosclerosis. Bilateral iliac stents are in place. No retroperitoneal or mesenteric lymphadenopathy is identified. No free air or fluid is seen in the abdomen. The splenic and portal veins are patent. The hepatic veins are grossly patent. The inferior vena cava and bilateral renal veins are patent. Bones/Soft Tissues: Ovoid lucent lesion in the right L4 vertebral body unchanged from 04/05/2021. No acute osseous or soft tissue abnormality. 1. 4.1 cm low-density mass in the pancreatic head suspicious for a primary pancreatic neoplasm. Severe intra and extrahepatic biliary ductal dilatation secondary to the mass. Mild gallbladder wall thickening and pericholecystic stranding possibly reactive secondary to the common duct obstruction with acute cholecystitis not excluded.  2. Several small low-density lesions in the liver new from 04/05/2021 with metastases not excluded. Consider further evaluation with a liver protocol MRI. 3. 14 mm indeterminate right adrenal nodule. This could also be further evaluated with MRI. 4. Small ovoid lucent lesion in the right L4 vertebral body unchanged from 04/05/2021. Given the long-term stability this is likely benign. Consider further evaluation with MRI. FL ERCP BILIARY S&I    Result Date: 1/24/2022  EXAMINATION: SPOT FLUOROSCOPIC IMAGES 1/24/2022 5:27 pm TECHNIQUE: Fluoroscopy was provided by the radiology department for procedure. Radiologist was not present during examination. FLUOROSCOPY DOSE AND TYPE OR TIME AND EXPOSURES: 6 minute 12 seconds, 11 images COMPARISON: 01/23/2022 CT HISTORY: Intraprocedural imaging. Mass in the pancreatic head observed on prior CT. FINDINGS: 11 spot images of the abdomen were obtained. Fluoroscopic assistance provided for ERCP. Imaging demonstrates severe biliary dilatation. A biliary stent is placed. There is evidence of extrinsic mass effect upon the stent. Intraprocedural fluoroscopic spot images as above. See separate procedure report for more information. XR CHEST PORTABLE    Result Date: 1/23/2022  EXAMINATION: ONE XRAY VIEW OF THE CHEST 1/23/2022 5:46 pm COMPARISON: None. HISTORY: ORDERING SYSTEM PROVIDED HISTORY: abd pian TECHNOLOGIST PROVIDED HISTORY: Reason for exam:->simón lao Reason for Exam: abd pain FINDINGS: The heart is normal.  The pulmonary vessels are engorged centrally. There are some hazy interstitial opacities throughout both lungs which is more prominent on the left. No effusion is seen. There is a questionable small hiatal hernia along the lower mediastinum. The aorta is ectatic. Hazy interstitial opacities throughout both lungs which is more prominent on the left and is increased. This could represent early multisegmental interstitial pneumonitis. Recommend follow-up Questionable small hiatal hernia.      ERCP    Result Date: 2022  No dictation     EUS    Result Date: 2022  No dictation     Scheduled Meds:   ipratropium-albuterol  1 ampule Inhalation TID    cefepime  2,000 mg IntraVENous Q12H    amLODIPine  5 mg Oral Daily    DULoxetine  30 mg Oral Daily    budesonide-formoterol  2 puff Inhalation BID    levothyroxine  50 mcg Oral Daily    pregabalin  150 mg Oral BID    sodium chloride flush  5-40 mL IntraVENous 2 times per day    nicotine  1 patch TransDERmal Daily    LORazepam  1 mg Oral Nightly    metroNIDAZOLE  500 mg IntraVENous Q8H     Continuous Infusions:   dextrose      pantoprozole (PROTONIX) infusion 8 mg/hr (22)    sodium chloride 25 mL (22)    sodium chloride 125 mL/hr at 22 0122     PRN Meds:.glucose, dextrose, glucagon (rDNA), dextrose, albuterol sulfate HFA, HYDROmorphone, ondansetron, bisacodyl, levalbuterol, sodium chloride flush, sodium chloride, ondansetron **OR** [DISCONTINUED] ondansetron, polyethylene glycol, acetaminophen **OR** acetaminophen, morphine **OR** morphine      Assessment:  77 y.o. female admitted with   1. Jaundice, non-    2. Transaminitis    3. Hyperbilirubinemia    4. Pancreatic mass        Pancreatic mass eroding into duodenum, with obstructive jaundice and pain, possible metastasis to liver with lesions noted on CT imaging  Status-post ERCP/EUS with decompressive biliary stent placement on 2022  Urinary tract infection  Hypertension  COPD  History of DVT, on Xarelto--currently held      Plan:  1. ERCP/EUS yesterday with decompressive stent; no plans for surgical intervention at this time, further workup needed; CEA 23.1, CA 19-9 and AFP pending  2. Clear liquid diet as tolerated, PPI; monitor bowel function  3. IV hydration; monitor and correct electrolytes  4. Antibiotics--on cefepime and Flagyl  5. Activity as tolerated--PT/OT following  6. PRN analgesics and antiemetics--minimizing narcotics as tolerated  7.  Management of medical comorbid etiologies per primary team and consulting services; consult oncology for further workup of pancreatic mass    EDUCATION:  Educated patient on plan of care and disease process--all questions answered. Plans discussed with patient and nursing. Reviewed and discussed with Dr. Ashlee Trent. CEA up, Bx's pending    Signed:  Brittany Domingo, APRN - CNP  1/25/2022 12:06 PM     Surgery Staff:     Pt seen and examined with NP  See full note above  Pt clinically a little improved today  On PPI, some pain continues, but nausea and labs are improving. No severe active clinical bleeding  Will see bx results, cancer suspected. Will need Onc eval, CT Chest and additional liver eval at least before considering exploration. SMA status would need to be clarified if possible as well  Would ask Pulm to see at some point as well before surgery considered. Would not be the easiest post op pulmonary course.     Analia Sherman MD

## 2022-01-25 NOTE — PROGRESS NOTES
Comprehensive Nutrition Assessment    Type and Reason for Visit:  Initial,Consult (poor appetite & po intake)    Nutrition Recommendations/Plan:   1. Advance diet beyond clears as medically appropriate  2. Chocolate Ensure Enlive TID    Nutrition Assessment:  Pt is at risk for nutrition compromise AEB report of poor po intake with decreased appetite reported on admission. Pt c/o nausea prior to admission. No wt loss identified & pt is unaware of any changes in wt. Pt has a pancreatic mass eroding into the duodenum with possible metastatic liver CA. Currently on a clear liquid diet. Pt would like Chocolate Ensure Enlive TID once diet advances beyond clears. Will monitor for tolerance as diet advances. Malnutrition Assessment:  Malnutrition Status:  No malnutrition    Context:  Acute Illness       Estimated Daily Nutrient Needs:  Energy (kcal):  9053- 8001 (25-30 kcal/74kg); Weight Used for Energy Requirements:  Current     Protein (g):  89- 111g (1.2-1.5g/74kg); Weight Used for Protein Requirements:  Current        Fluid (ml/day):   ; Method Used for Fluid Requirements:  1 ml/kcal      Nutrition Related Findings:  No edema noted; Elevated liver ezymes; k+ 6.2. Wounds:  None       Current Nutrition Therapies:    ADULT DIET; Full Liquid    Anthropometric Measures:  · Height: 5' 5\" (165.1 cm)  · Current Body Weight: 163 lb (73.9 kg)   · Admission Body Weight:      · Usual Body Weight:       · Ideal Body Weight: 125 lbs; % Ideal Body Weight 130.4 %   · BMI: 27.1  · Adjusted Body Weight:  ; No Adjustment   · Adjusted BMI:      · BMI Categories: Overweight (BMI 25.0-29. 9)       Nutrition Diagnosis:   · Inadequate oral intake related to inadequate protein-energy intake as evidenced by NPO or clear liquid status due to medical condition,poor intake prior to admission,nausea      Nutrition Interventions:   Food and/or Nutrient Delivery:  Start Oral Nutrition Supplement,Modify Current Diet (advance diet beyond clear liquids)  Nutrition Education/Counseling:  Education not indicated   Coordination of Nutrition Care:  Continue to monitor while inpatient    Goals:  tolerance to po diet as advanced beyond clears, with intake at least 50% of meals & supplements       Nutrition Monitoring and Evaluation:   Behavioral-Environmental Outcomes:  None Identified   Food/Nutrient Intake Outcomes:  Diet Advancement/Tolerance,Food and Nutrient Intake,Supplement Intake  Physical Signs/Symptoms Outcomes:  Biochemical Data,Nausea or Vomiting     Discharge Planning:     Too soon to determine     Electronically signed by Michel Meade RD, LD on 1/25/22 at 2:44 PM EST    Contact: [phone]

## 2022-01-25 NOTE — PROGRESS NOTES
Gastroenterology Progress Note    Ryan Foley is a 77 y.o. female patient. Active Problems:    Obstructive jaundice    Jaundice, non-  Resolved Problems:    * No resolved hospital problems. *      SUBJECTIVE:  Resting in bed, complaining of side pain. EUS/ERCP yesterday. Oncology and general surgery consulted. ROS:  No fever, chills  No chest pain, palpitations  No SOB, cough  Gastrointestinal ROS: see above    Physical    VITALS:  /78   Pulse 83   Temp 97.8 °F (36.6 °C) (Axillary)   Resp 18   Ht 5' 5\" (1.651 m)   Wt 163 lb 3.2 oz (74 kg)   LMP 1991 (Approximate) Comment: menarche 7years old  SpO2 95%   BMI 27.16 kg/m²   TEMPERATURE:  Current - Temp: 97.8 °F (36.6 °C); Max - Temp  Av.3 °F (36.3 °C)  Min: 96.7 °F (35.9 °C)  Max: 98.3 °F (36.8 °C)    NAD  Regular rate   Lungs CTA Bilaterally  Abdomen soft, ND, NT,  Bowel sounds normal.    Data    Data Review:    Recent Labs     22  0858   WBC 20.7* 20.6* 14.5*   HGB 10.1* 9.7* 11.1*   HCT 31.8* 30.5* 36.1   MCV 94.7 94.5 99.5    203 189     Recent Labs     22  0424 22  1003 22  0858   * 137 138   K 5.5* 5.1 6.2*    104 106   CO2 20* 20* 11*   PHOS 4.9 4.3  --    BUN 19 20 35*   CREATININE <0.5* 0.6 0.8     Recent Labs     22  1802 22  0424 22  0844   * 619* 542*   * 522* 459*   BILIDIR 9.6* >10.0* 7.7*   BILITOT 14.0* 13.8* 10.3*   ALKPHOS 1,780* 1,746* 1,657*     Recent Labs     22  0424   LIPASE 62.0* 18.0     Recent Labs     22  1802 22  0423   PROTIME 16.3* 15.6*   INR 1.42* 1.36*     No results for input(s): PTT in the last 72 hours. ASSESSMENT :  Pancreatic Mass: Per CT w/ IV contrast 4.1 CM low density Mass in the pancreatic head suspicious for primary pancreatic neoplasm. ERCP/EUS 2022  .   Pancreatic mass lesion eroding into duodenum creating an ulcer and bleeding. Status post stent placement  Obstructive jaundice: Secondary to above, severe intra and extrahepatic biliary ductal dilation. Bili 14 Alkphos 1700  . LFTs improving following ERCP/EUS  COPD: On 2L NC at home while sleep. Wheezing noted today, no SOB. Rapid covid neg 1/23/22    PLAN :  - hematology oncology consulted   - general surgery following for consideration of Whipple procedure  - CA-19-9 pending    Discussed with Dr. Chey Decker, [address] [place] [address]    I have personally performed a face to face diagnostic evaluation on this patient. I have interviewed and examined the patient and I agree with the findings and recommended plan of care. In summary, my findings and plan are the following:  Having some pain on right side. Some ttp. No sign of bleeding. LFT improving. Plan is for surgical and Onc consultation. Await pancreatic FNA results. Can change PPI to oral.   Will try full liq diet and if tolerated can slowly advance to low fiber diet.        Radu Tejeda MD  [address] and [address]

## 2022-01-26 NOTE — CONSULTS
PALLIATIVE MEDICINE CONSULTATION     Patient name:Linda Connors   ROSIO:[id number]    :1945  Room/Bed:[facility]4485/4485-01   LOS: 3 days         Date of consult:2022    Consult Information  Palliative Medicine Consult performed by: SUSIE Sow CNP      Inpatient consult to Palliative Care  Consult performed by: SUSIE Sow CNP  Consult ordered by: SUSIE Luis CNP  Reason for consult: [address] and code status        Number of admissions past 12 months: 1      ASSESSMENT/RECOMMENDATIONS     77 y.o. female with abdominal pain and debility related to pancreatic mass      Symptom Management:  1. Abdominal pain related to cancer- pt reports that it is better but still rates 7/10  2. Debility- pt needs assistance with ADLs shes increasingly more weak the last 4-6 weeks  3. Goals of Care- talked to pt at bedside shes been through 3 other cancers and she states that she is tired and doesn't;t want any chemo or surgery given that her cancer is not curable. She wants to have her pain managed and to stay independent at home as long as possible. We discussed Hospice care and she feels that best aligns with her goals. We discussed code status and pt while she would like to live longer understands that she is not a good candidate for CPR and intubation given her co-morbidities. Pt has complicated social situation she lives with her daughter who has a history of drug abuse and may be currently using its unclear and her granddaughter her boyfriend and their baby. It is the patients home and she reports that her SSI supports them all. I was unable to leave message for daughter, I did leave one for her granddaughter. Code updated to [facility]. Hospice referral placed pt picked [facility] she would benefit from [place] [id number] to help manage pain. Will await pathology report to confirm this is pancreatic cancer and ask Maryann to meet with pt and family tomorrow.      Patient/Family Goals of Care :    4. talked to pt at bedside shes been through 3 other cancers and she states that she is tired and doesn't;t want any chemo or surgery given that her cancer is not curable. She wants to have her pain managed and to stay independent at home as long as possible. We discussed Hospice care and she feels that best aligns with her goals. We discussed code status and pt while she would like to live longer understands that she is not a good candidate for CPR and intubation given her co-morbidities. Pt has complicated social situation she lives with her daughter who has a history of drug abuse and may be currently using its unclear and her granddaughter her boyfriend and their baby. It is the patients home and she reports that her SSI supports them all. I was unable to leave message for daughter, I did leave one for her granddaughter. Code updated to [facility]. Hospice referral placed pt picked [facility] she would benefit from [place] [address] to help manage pain. Will await pathology report to confirm this is pancreatic cancer and ask Yobanias to meet with pt and family tomorrow. Disposition/Discharge Plan:   pending    Advance Directives:  · Surrogate Decision Maker: Roxana-daughter  · Code status:  DNR-CCA    Case discussed with: patient, floor RN, Dr Alex Verde  Thank you for allowing us to participate in the care of this patient. HISTORY     CC: abdominal pain  HPI: The patient is a 77 y.o. female presents with jaundice, and a pancreatic mass, HOP, approx 4 cm size. Pt seen in ER with abd pain and admitted, central, mid abd pain, moderate with pressure, worse after eating. No F/C. Has felt jaundiced and had a decreased appetite. Has COPD, intermittent O2 use, and history of lung, breast and vulvar cancer, HTN, and Aortic aneurysm.      Palliative Medicine SymptomScreening/ROS:  Review of Systems -   History obtained from chart review and the patient  General ROS: positive for  - fatigue, malaise and weight loss  Gastrointestinal ROS: positive for - abdominal pain and nausea/vomiting  Musculoskeletal ROS: positive for - gait disturbance and muscular weakness     Patient unable to complete full ROS due to current cognitive status. Information that is obtained from nursing and chart.      Pain:  7/10 abd pain    Home med list and hospital medications reviewed in chart as of 1/26/2022     EXAM     Vitals:    01/26/22 0852   BP:    Pulse:    Resp: 18   Temp:    SpO2: 95%       Physical Examination:   General appearance - alert, well appearing, and in no distress and in mild to moderate distress  Mental status - alert, oriented to person, place, and time  Neck - supple, no significant adenopathy  Chest - clear to auscultation, no wheezes, rales or rhonchi, symmetric air entry  Abdomen - abdomen tender and distended  Musculoskeletal - no joint tenderness, deformity or swelling  Skin - jaundice        Current labs in the epic chart reviewed as of 1/26/2022   Review of previous notes, admits, labs, radiology and testing relevant to this consult done in this chart today 1/26/2022      Total time: 70 minutes  >50% of time spent counseling patient at bedside or POA/family member if applicable , reviewing information and discussing care, coordinating with care team  Signed By: Electronically signed by SUSIE Jackson CNP on 1/26/2022 at 11:48 AM  Palliative Medicine   [phone]    January 26, 2022

## 2022-01-26 NOTE — PROGRESS NOTES
Oncology and Hematology Care   Progress Note      1/26/2022 12:50 PM        Name: Fernando Jacobson . Admitted: 1/23/2022    SUBJECTIVE:  Patient complaining of abdominal pain. Current dose of dilaudid is ineffective.      Reviewed interval ancillary notes    Current Medications  HYDROmorphone (DILAUDID) injection 1.5 mg, Q3H PRN  pantoprazole (PROTONIX) tablet 40 mg, QAM AC  glucose (GLUTOSE) 40 % oral gel 15 g, PRN  dextrose 50 % IV solution, PRN  glucagon (rDNA) injection 1 mg, PRN  dextrose 5 % solution, PRN  ipratropium-albuterol (DUONEB) nebulizer solution 1 ampule, TID  albuterol sulfate  (90 Base) MCG/ACT inhaler 2 puff, Q4H PRN  ondansetron (ZOFRAN) injection 4 mg, Q6H PRN  cefepime (MAXIPIME) 2000 mg IVPB minibag, Q12H  amLODIPine (NORVASC) tablet 5 mg, Daily  bisacodyl (DULCOLAX) EC tablet 5 mg, Nightly PRN  DULoxetine (CYMBALTA) extended release capsule 30 mg, Daily  budesonide-formoterol (SYMBICORT) 160-4.5 MCG/ACT inhaler 2 puff, BID  levalbuterol (XOPENEX) nebulization 0.63 mg, Q6H PRN  levothyroxine (SYNTHROID) tablet 50 mcg, Daily  pregabalin (LYRICA) capsule 150 mg, BID  sodium chloride flush 0.9 % injection 5-40 mL, 2 times per day  sodium chloride flush 0.9 % injection 5-40 mL, PRN  0.9 % sodium chloride infusion, PRN  ondansetron (ZOFRAN-ODT) disintegrating tablet 4 mg, Q8H PRN  polyethylene glycol (GLYCOLAX) packet 17 g, Daily PRN  acetaminophen (TYLENOL) tablet 650 mg, Q6H PRN   Or  acetaminophen (TYLENOL) suppository 650 mg, Q6H PRN  nicotine (NICODERM CQ) 21 MG/24HR 1 patch, Daily  0.9 % sodium chloride infusion, Continuous  LORazepam (ATIVAN) tablet 1 mg, Nightly  metronidazole (FLAGYL) 500 mg in NaCl 100 mL IVPB premix, Q8H        Objective:  /72   Pulse 93   Temp 97.9 °F (36.6 °C) (Oral)   Resp 16   Ht 5' 5\" (1.651 m)   Wt 175 lb 12.8 oz (79.7 kg) Comment: One pillow, one sheet  LMP 03/11/1991 (Approximate) Comment: menarche 7years old  SpO2 93%   BMI 29.25 kg/m²     Intake/Output Summary (Last 24 hours) at 2022 1250  Last data filed at 2022 1404  Gross per 24 hour   Intake 1975.57 ml   Output --   Net 1975.57 ml      Wt Readings from Last 3 Encounters:   22 175 lb 12.8 oz (79.7 kg)   21 160 lb (72.6 kg)   21 148 lb (67.1 kg)       General appearance:  Appears comfortable  Eyes: Sclera clear. Pupils equal.  ENT: Moist oral mucosa. Trachea midline, no adenopathy. Cardiovascular: Regular rhythm, normal S1, S2. No murmur. No edema in lower extremities  Respiratory: Not using accessory muscles. Good inspiratory effort. Clear to auscultation bilaterally, no wheeze or crackles. GI: Abdomen soft, no tenderness, not distended  Musculoskeletal: No cyanosis in digits, neck supple  Neurology: CN 2-12 grossly intact. No speech or motor deficits  Psych: Normal affect. Alert and oriented in time, place and person  Skin: Warm, dry, normal turgor    Labs and Tests:  CBC:   Recent Labs     22  0424 22  0858 22  0522   WBC 20.6* 14.5* 16.5*   HGB 9.7* 11.1* 9.7*    189 197     BMP:    Recent Labs     22  1003 22  0858 22  0522    138 136   K 5.1 6.2* 4.7    106 106   CO2 20* 11* 15*   BUN 20 35* 31*   CREATININE 0.6 0.8 0.6   GLUCOSE 95 192* 152*     Hepatic:   Recent Labs     22  0424 22  0844 22  0522   * 542* 249*   * 459* 359*   BILITOT 13.8* 10.3* 4.4*   ALKPHOS 1,746* 1,657* 1,123*       ASSESSMENT AND PLAN    Active Problems:    Obstructive jaundice    Jaundice, non-  Resolved Problems:    * No resolved hospital problems. *      69 year old female with a history of COPD, depression, hypertension and osteopenia. She has:     1. Pancreatic mass:  -CT of the abdomen and pelvis found 4.1 cm low-density mass in the pancreatic head with severe intra and extrahepatic biliary ductal dilation secondary to the mass.  There are several liver lesion and a 14 mm indeterminate adrenal lesion.  -Findings highly suspicious for pancreatic cancer with mets to the liver.   -She had EUS/ERCP with biopsy and stent placement on 1/24/2022, pathology is pending. -CA 19-9 is pending.   -Seen by general surgery, no plans for surgical intervention. -Explained to the patient that she has metastatic disease. Any treatment would be palliative/life prolonging. Explained poor prognosis, especially with pancreatic cancer. She has poor performance status. Hospice is appropriate.    -Requested palliative care consult.     2. Obstructive jaundice:  -Due to pancreatic mass. -LFTs improving following ERCP/EUS.     3. History of lung cancer:  -Status post left video-assisted thoracoscopy, wedge resection of left upper lobe nodule, bronchoscopy, and intercostal nerve block by Dr. Chayito Fournier on 11/2/2018.      4. History of breast cancer:  -Status post right lumpectomy and a sentinel lymph node dissection 2/2014 followed by 5 years of aromatase inhibitor therapy.     5. History of vulvar cancer:  -Radical vulvectomy with left inguinal lymph node dissection in 11/2008. 6. Anemia  -Will check iron studies, B12 and folate.  -Transfuse if hgb less than 7.        Crissy Laguna, [facility]  Oncology Hematology Care, [address] [place] [address].  [phone]

## 2022-01-26 NOTE — PROGRESS NOTES
Shift assessment is completed, patient is alert and oriented x4, pain managed with PRN med. Call light in reach.

## 2022-01-26 NOTE — PROGRESS NOTES
Speech Language Pathology  Dysphagia Treatment Note    Name:  Sindi Tate  :   1945  Medical Diagnosis:  Hyperbilirubinemia [E80.6]  Obstructive jaundice [K83.1]  Transaminitis [R74.01]  Pancreatic mass [K86.89]  Jaundice, non- [R17]  Treatment Diagnosis: Oropharyngeal Dysphagia  Pain level: Reported 8/10 stomach pain- RN notified     Diet level prior to treatment: Clear Liquid diet per MD with Thin Liquids, Meds as tolerated  Tolerance of Current Diet Level: Per chart review and RN report, no noted difficulty with current diet. Assessment of Texture Tolerance:  -Impressions: Pt HOB raised upon SLP arrival currently on 4L O2 via nasal cannula. Pt continues of Clear Liquid diet therefore was accepting of thin liquids via straw and jello. Successive drinks of thin liquids via straw revealed suspected premature spillage to pharynx with a mildly delayed swallow initiation. Mildly prolonged oral manipulation however good oral clearance achieved with trials of jello. Pt overall tolerating across consistencies provided this date therefore recommend continuation of current diet with use of general swallow precautions. Diet and Treatment Recommendations 2022:  Recommend continuation of Clear Liquid diet per MD with Thin Liquids, Meds as tolerated    Compensatory strategies: Upright as possible with all PO intake , Small bites/sips , Eat/feed slowly, Remain upright 30-45 min     Dysphagia Goals:   1.) Pt will functionally tolerate recommended diet with no overt clinical s/s of aspiration (ongoing 2022)  2.) Pt will functionally tolerate ongoing assessment of swallow function with diet to be determined as indicated  (ongoing 2022)     Plan:  Continued daily Dysphagia treatment with goals per plan of care.     Patient/Family Education:Education given to the Pt and nurse, who verbalized understanding    Discharge Recommendations:  Pt will benefit from continued skilled Speech Therapy for Dysphagia services, prior to returning home. Timed Code Treatment: 0 minutes    Total Treatment Time: 12 minutes    If patient discharges prior to next session this note will serve as a discharge summary.      Signature:   Deirdre Rodriguez., [address] [place] [address]  Speech-Language Pathologist  1/26/2022 11:46 AM

## 2022-01-26 NOTE — PROGRESS NOTES
[facility] and Laparoscopic Surgery        Progress Note    Patient Name: Fernando Jacobson  MRN: [id number]  YOB: 1945  Date of Evaluation: 2022    Chief Complaint: Abdominal pain    Subjective:  No acute events overnight  Pain continues and is about the same  Complains of nausea, no vomiting  Denies flatus or stool, feels bloated  Resting in bed at this time      Vital Signs:  Patient Vitals for the past 24 hrs:   BP Temp Temp src Pulse Resp SpO2 Height Weight   22 0852 -- -- -- -- 18 95 % -- --   22 0836 (!) 152/77 98.2 °F (36.8 °C) Oral 83 16 96 % -- --   22 0801 -- -- -- -- -- -- -- 175 lb 12.8 oz (79.7 kg)   22 0529 -- -- -- -- 16 96 % -- --   22 0415 (!) 147/80 97.5 °F (36.4 °C) Oral 80 16 97 % -- --   22 0045 (!) 144/84 97.5 °F (36.4 °C) Oral 80 16 96 % -- --   22 2045 129/78 97.6 °F (36.4 °C) Oral 83 18 93 % -- --   22 -- -- -- -- 18 94 % -- --   22 1644 -- -- -- -- 18 95 % -- --   22 1436 -- -- -- -- -- -- 5' 5\" (1.651 m) --   22 1358 -- -- -- -- -- -- -- 163 lb 3.2 oz (74 kg)   22 1200 132/78 97.8 °F (36.6 °C) Axillary 83 18 95 % -- --      TEMPERATURE HISTORY 24H: Temp (24hrs), Av.7 °F (36.5 °C), Min:97.5 °F (36.4 °C), Max:98.2 °F (36.8 °C)    BLOOD PRESSURE HISTORY: Systolic (14HGL), KGV:525 , Min:128 , LPC:703    Diastolic (79BKX), KRO:26, Min:77, Max:94      Intake/Output:  I/O last 3 completed shifts: In: 1975.6 [I.V.:1467.4; IV Piggyback:508.2]  Out: -   No intake/output data recorded.   Drain/tube Output:       Physical Exam:  General: awake, alert, oriented to person, place, time  Cardiovascular:  regular rate and rhythm and no murmur noted  Lungs: clear to auscultation  Abdomen: soft, mildly distended, mild suprapubic and epigastric region tenderness only, bowel sounds present   Skin: jaundice noted    Labs:  CBC:    Recent Labs     22  0424 22  0858 22  0522   WBC 20.6* 14.5* 16.5*   HGB 9.7* 11.1* 9.7*   HCT 30.5* 36.1 34.0*    189 197     BMP:    Recent Labs     01/24/22  1003 01/25/22  0858 01/26/22  0522    138 136   K 5.1 6.2* 4.7    106 106   CO2 20* 11* 15*   BUN 20 35* 31*   CREATININE 0.6 0.8 0.6   GLUCOSE 95 192* 152*     Hepatic:    Recent Labs     01/24/22  0424 01/25/22  0844 01/26/22  0522   * 542* 249*   * 459* 359*   BILITOT 13.8* 10.3* 4.4*   ALKPHOS 1,746* 1,657* 1,123*     Amylase:  No results found for: AMYLASE  Lipase:    Lab Results   Component Value Date    LIPASE 18.0 01/24/2022    LIPASE 62.0 01/23/2022      Mag:    Lab Results   Component Value Date    MG 1.80 11/03/2018     Phos:     Lab Results   Component Value Date    PHOS 4.3 01/24/2022    PHOS 4.9 01/24/2022      Coags:   Lab Results   Component Value Date    PROTIME 15.6 01/24/2022    PROTIME 13.5 06/07/2011    INR 1.36 01/24/2022    APTT 33.1 01/24/2022       Cultures:  Anaerobic culture  No results found for: LABANAE  Fungus stain  No results found for requested labs within last 30 days. Gram stain  No results found for requested labs within last 30 days. Organism  No results found for: [facility]  Surgical culture  No results found for: CXSURG  Blood culture 1  Results in Past 30 Days  Result Component Current Result Ref Range Previous Result Ref Range   Blood Culture, Routine No Growth to date. Any change in status will be called. (1/23/2022)  Not in Time Range      Blood culture 2  Results in Past 30 Days  Result Component Current Result Ref Range Previous Result Ref Range   Culture, Blood 2 No Growth to date. Any change in status will be called. (1/23/2022)  Not in Time Range      Fecal occult  No results found for requested labs within last 30 days. GI bacterial pathogens by PCR  No results found for requested labs within last 30 days. C. difficile  No results found for requested labs within last 30 days.      Urine culture  Lab Results Component Value Date    LABURIN  01/23/2022     <50,000 CFU/ml mixed skin/urogenital angle. No further workup       Pathology:  No relevant pathology     Imaging:  I have personally reviewed the following films:    FL ERCP BILIARY S&I    Result Date: 1/24/2022  EXAMINATION: SPOT FLUOROSCOPIC IMAGES 1/24/2022 5:27 pm TECHNIQUE: Fluoroscopy was provided by the radiology department for procedure. Radiologist was not present during examination. FLUOROSCOPY DOSE AND TYPE OR TIME AND EXPOSURES: 6 minute 12 seconds, 11 images COMPARISON: 01/23/2022 CT HISTORY: Intraprocedural imaging. Mass in the pancreatic head observed on prior CT. FINDINGS: 11 spot images of the abdomen were obtained. Fluoroscopic assistance provided for ERCP. Imaging demonstrates severe biliary dilatation. A biliary stent is placed. There is evidence of extrinsic mass effect upon the stent. Intraprocedural fluoroscopic spot images as above. See separate procedure report for more information. ERCP    Result Date: 1/24/2022  No dictation     EUS    Result Date: 1/24/2022  No dictation     Scheduled Meds:   pantoprazole  40 mg Oral QAM AC    ipratropium-albuterol  1 ampule Inhalation TID    cefepime  2,000 mg IntraVENous Q12H    amLODIPine  5 mg Oral Daily    DULoxetine  30 mg Oral Daily    budesonide-formoterol  2 puff Inhalation BID    levothyroxine  50 mcg Oral Daily    pregabalin  150 mg Oral BID    sodium chloride flush  5-40 mL IntraVENous 2 times per day    nicotine  1 patch TransDERmal Daily    LORazepam  1 mg Oral Nightly    metroNIDAZOLE  500 mg IntraVENous Q8H     Continuous Infusions:   dextrose      sodium chloride 25 mL (01/24/22 2213)    sodium chloride Stopped (01/24/22 1443)     PRN Meds:. HYDROmorphone, glucose, dextrose, glucagon (rDNA), dextrose, albuterol sulfate HFA, ondansetron, bisacodyl, levalbuterol, sodium chloride flush, sodium chloride, ondansetron **OR** [DISCONTINUED] ondansetron, polyethylene glycol, acetaminophen **OR** acetaminophen      Assessment:  77 y.o. female admitted with   1. Jaundice, non-    2. Transaminitis    3. Hyperbilirubinemia    4. Pancreatic mass        Pancreatic mass eroding into duodenum, with obstructive jaundice and pain, possible metastasis to liver with lesions noted on CT imaging  Status-post ERCP/EUS with decompressive biliary stent placement and biopsy on 2022  Urinary tract infection  Hypertension  COPD  History of DVT, on Xarelto--currently held      Plan:  1. No plans for surgical intervention at this time, further workup needed; CEA 23.1, CA 19-9 and AFP pending, biopsy pending; continue supportive care  2. Full liquid diet as tolerated, PPI; monitor bowel function  3. IV hydration; monitor and correct electrolytes  4. Antibiotics--on cefepime and Flagyl  5. Activity as tolerated--PT/OT following  6. PRN analgesics and antiemetics--minimizing narcotics as tolerated  7. Management of medical comorbid etiologies per primary team and consulting services; Palliative care consult pending    EDUCATION:  Educated patient on plan of care and disease process--all questions answered. Plans discussed with patient and nursing. Reviewed and discussed with Dr. Yesica Torrez.       Signed:  SUSIE Carlin CNP  2022 11:23 AM     Surgery Staff:     Pt seen and examined with NP  See full note above  Pt has had drop in LFT, clinically remaining about the same  Cytology pending, Ca 19-9 pending  Needs addn met braulio Fernández MD

## 2022-01-26 NOTE — PROGRESS NOTES
Physician Progress Note      Yuli Puentes  [facility] #:                  [id number]  :                       1945  ADMIT DATE:       2022 5:26 PM  [address] [place] DATE:  RESPONDING  PROVIDER #:        Mikal Bourne MD          QUERY TEXT:    Patient admitted with abd pain. Noted documentation of UTI on  H&P. In   order to support the diagnosis of UTI, please include additional clinical   indicators in your documentation. Or please document if the diagnosis of UTI   has been ruled out after further study. The medical record reflects the following:  Risk Factors: Pancreatic mass  Clinical Indicators: urine- dark brown, cloudy, small leuks, positive   nitrites, large bili, WBC- 10.  CX- <50,000 CFU/ml mixed skin/urogenital   angle. No further workup  Treatment: UA, Urine CX, Cefepime  Options provided:  -- UTI present as evidenced by, Please document evidence. -- UTI was ruled out  -- Other - I will add my own diagnosis  -- Disagree - Not applicable / Not valid  -- Disagree - Clinically unable to determine / Unknown  -- Refer to Clinical Documentation Reviewer    PROVIDER RESPONSE TEXT:    UTI was ruled out after study.     Query created by: Kiko Wolfe on 2022 2:10 PM      Electronically signed by:  Mikal Bourne MD 2022 2:40 PM

## 2022-01-26 NOTE — PROGRESS NOTES
Physician Progress Note      Anusha Greco  CSN #:                  [id number]  :                       1945  ADMIT DATE:       2022 5:26 PM  [facility] DATE:  RESPONDING  PROVIDER #:        Marisol Foster CNP        QUERY TEXT:    Type of Anemia: Please provide further specificity, if known. Clinical indicators include: hgb, cancer, anemia, iron studies, b12, transfuse  Options provided:  -- Anemia due to acute blood loss  -- Anemia due to chronic blood loss  -- Anemia due to iron deficiency  -- Anemia due to postoperative blood loss  -- Anemia due to chronic disease  -- Other - I will add my own diagnosis  -- Disagree - Not applicable / Not valid  -- Disagree - Clinically Unable to determine / Unknown        PROVIDER RESPONSE TEXT:    The patient has anemia due to acute blood loss.       Electronically signed by:  Marisol Foster CNP 2022 3:30 PM

## 2022-01-26 NOTE — PROGRESS NOTES
Shift assessment completed. VSS. Patient alert and oriented x 4. Reports having pain all over. Rates pain as 9/10. Medication given per [facility]. The care plan and education have been reviewed and mutually agreed upon with the patient. Call light in reach. Will continue to monitor.

## 2022-01-26 NOTE — PROGRESS NOTES
[facility]    1/26/2022 12:34 PM        Name: Shyanne Ortiz . Admitted: 1/23/2022  Primary Care Provider: Bimal Estrella MD (Tel: [phone])                        Subjective:  . No acute events overnight. Resting well. Pain control. Diet ok. Labs reviewed  -Still having some abdominal pain.   Appears jaundiced as well but improved  Reviewed interval ancillary notes    Current Medications  HYDROmorphone (DILAUDID) injection 1.5 mg, Q3H PRN  pantoprazole (PROTONIX) tablet 40 mg, QAM AC  glucose (GLUTOSE) 40 % oral gel 15 g, PRN  dextrose 50 % IV solution, PRN  glucagon (rDNA) injection 1 mg, PRN  dextrose 5 % solution, PRN  ipratropium-albuterol (DUONEB) nebulizer solution 1 ampule, TID  albuterol sulfate  (90 Base) MCG/ACT inhaler 2 puff, Q4H PRN  ondansetron (ZOFRAN) injection 4 mg, Q6H PRN  cefepime (MAXIPIME) 2000 mg IVPB minibag, Q12H  amLODIPine (NORVASC) tablet 5 mg, Daily  bisacodyl (DULCOLAX) EC tablet 5 mg, Nightly PRN  DULoxetine (CYMBALTA) extended release capsule 30 mg, Daily  budesonide-formoterol (SYMBICORT) 160-4.5 MCG/ACT inhaler 2 puff, BID  levalbuterol (XOPENEX) nebulization 0.63 mg, Q6H PRN  levothyroxine (SYNTHROID) tablet 50 mcg, Daily  pregabalin (LYRICA) capsule 150 mg, BID  sodium chloride flush 0.9 % injection 5-40 mL, 2 times per day  sodium chloride flush 0.9 % injection 5-40 mL, PRN  0.9 % sodium chloride infusion, PRN  ondansetron (ZOFRAN-ODT) disintegrating tablet 4 mg, Q8H PRN  polyethylene glycol (GLYCOLAX) packet 17 g, Daily PRN  acetaminophen (TYLENOL) tablet 650 mg, Q6H PRN   Or  acetaminophen (TYLENOL) suppository 650 mg, Q6H PRN  nicotine (NICODERM CQ) 21 MG/24HR 1 patch, Daily  0.9 % sodium chloride infusion, Continuous  LORazepam (ATIVAN) tablet 1 mg, Nightly  metronidazole (FLAGYL) 500 mg in NaCl 100 mL IVPB premix, Q8H        Objective:  BP (!) 152/77   Pulse 83   Temp 98.2 °F (36.8 °C) (Oral)   Resp 18   Ht 5' 5\" (1.651 m)   Wt 175 lb 12.8 oz (79.7 kg) Comment: One pillow, one sheet  LMP 1991 (Approximate) Comment: menarche 7years old  SpO2 95%   BMI 29.25 kg/m²     Intake/Output Summary (Last 24 hours) at 2022 1234  Last data filed at 2022 1404  Gross per 24 hour   Intake 1975.57 ml   Output --   Net 1975.57 ml      Wt Readings from Last 3 Encounters:   22 175 lb 12.8 oz (79.7 kg)   21 160 lb (72.6 kg)   21 148 lb (67.1 kg)       General appearance:  Appears comfortable  Eyes: Sclera icterus jaundice appearance. Pupils equal.  ENT: Moist oral mucosa. Trachea midline, no adenopathy. Cardiovascular: Regular rhythm, normal S1, S2. No murmur. No edema in lower extremities  Respiratory: Not using accessory muscles. Good inspiratory effort. Clear to auscultation bilaterally, no wheeze or crackles. GI: Abdomen soft, no tenderness, not distended, normal bowel sounds  Musculoskeletal: No cyanosis in digits, neck supple  Neurology: CN 2-12 grossly intact. No speech or motor deficits  Psych: Normal affect.  Alert and oriented in time, place and person  Skin: Warm, dry, normal turgor    Labs and Tests:  CBC:   Recent Labs     22  0424 22  0858 22  0522   WBC 20.6* 14.5* 16.5*   HGB 9.7* 11.1* 9.7*    189 197     BMP:    Recent Labs     22  1003 22  0858 22  0522    138 136   K 5.1 6.2* 4.7    106 106   CO2 20* 11* 15*   BUN 20 35* 31*   CREATININE 0.6 0.8 0.6   GLUCOSE 95 192* 152*     Hepatic:   Recent Labs     22  0424 22  0844 22  0522   * 542* 249*   * 459* 359*   BILITOT 13.8* 10.3* 4.4*   ALKPHOS 1,746* 1,657* 1,123*       Discussed care with patient             Problem List  Active Problems:    Obstructive jaundice    Jaundice, non-  Resolved Problems:    * No resolved hospital problems. *       Assessment & Plan:   1. Pancreatic lesion mass  -With obstructive jaundice.  -Total bilirubin LFT LFTs are trending  -P potential [facility] oncology consult status post EUS with ultrasound  -See GI note for the  Overall lab still continues to trend slowly  Diet per GI and general surgery  -No plans for surgical invention.  -  Goals of care was discussed. Awaiting pathology. -CODE STATUS adjusted to DNR CCA  -Patient to meet with hospice and then family will decide tomorrow    Diet: ADULT DIET;  Full Liquid  ADULT ORAL NUTRITION SUPPLEMENT; Breakfast, Lunch, Dinner; Standard High Calorie/High Protein Oral Supplement  Code:DNR-CCA  DVT PPX natacha Clifford MD   1/26/2022 12:34 PM

## 2022-01-27 NOTE — PROGRESS NOTES
with Hospice care.            Disposition/Discharge Plan:   pending     Advance Directives:  · Surrogate Decision Maker: Roxana-daughter  · Code status:  DNR-CCA     Case discussed with: patient, floor RN, Dr Maria Esther Green  Thank you for allowing us to participate in the care of this patient. SUBJECTIVE     Chief Complaint: Abd pain    Last 24 hours:   Pt is drowsy and confused today    ROS:  Review of Systems -   History obtained from chart review and the patient  General ROS: positive for  - fatigue, malaise and weight loss  Gastrointestinal ROS: positive for - abdominal pain and nausea/vomiting  Musculoskeletal ROS: positive for - gait disturbance and muscular weakness      Patient unable to complete full ROS due to current cognitive status. Information that is obtained from nursing and chart. OBJECTIVE   BP (!) 160/82   Pulse 86   Temp 98.3 °F (36.8 °C) (Oral)   Resp 18   Ht 5' 5\" (1.651 m)   Wt 175 lb 12.8 oz (79.7 kg) Comment: One pillow, one sheet  LMP 03/11/1991 (Approximate) Comment: menarche 7years old  SpO2 92%   BMI 29.25 kg/m²   I/O last 3 completed shifts:  In: -   Out: 1500 [Urine:1500]  No intake/output data recorded.       Physical Examination:     General appearance - alert, well appearing, and in no distress and in mild to moderate distress  Mental status - alert, oriented to person, place, and time  Neck - supple, no significant adenopathy  Chest - clear to auscultation, no wheezes, rales or rhonchi, symmetric air entry  Abdomen - abdomen tender and distended  Musculoskeletal - no joint tenderness, deformity or swelling  Skin - jaundice       Total time: 35 minutes  >50% of time spent counseling patient at bedside or POA/family member if applicable , reviewing information and discussing care, coordinating with care team       Signed By: Electronically signed by SUSIE Reilly CNP on 1/27/2022 at 12:12 PM   Palliative Medicine       January 27, 2022

## 2022-01-27 NOTE — PROGRESS NOTES
[facility]    1/27/2022 11:59 AM        Name: Magalys Myrick . Admitted: 1/23/2022  Primary Care Provider: Melissa Hong MD (Tel: [phone])                        Subjective:  . No acute events overnight. Resting well. Pain control. Diet ok. Labs reviewed  -Still having some abdominal pain.   Appears jaundiced as well but improved  Reviewed interval ancillary notes    Current Medications  HYDROmorphone (DILAUDID) injection 1.5 mg, Q3H PRN  pantoprazole (PROTONIX) tablet 40 mg, QAM AC  glucose (GLUTOSE) 40 % oral gel 15 g, PRN  dextrose 50 % IV solution, PRN  glucagon (rDNA) injection 1 mg, PRN  dextrose 5 % solution, PRN  ipratropium-albuterol (DUONEB) nebulizer solution 1 ampule, TID  albuterol sulfate  (90 Base) MCG/ACT inhaler 2 puff, Q4H PRN  ondansetron (ZOFRAN) injection 4 mg, Q6H PRN  cefepime (MAXIPIME) 2000 mg IVPB minibag, Q12H  amLODIPine (NORVASC) tablet 5 mg, Daily  bisacodyl (DULCOLAX) EC tablet 5 mg, Nightly PRN  DULoxetine (CYMBALTA) extended release capsule 30 mg, Daily  budesonide-formoterol (SYMBICORT) 160-4.5 MCG/ACT inhaler 2 puff, BID  levalbuterol (XOPENEX) nebulization 0.63 mg, Q6H PRN  levothyroxine (SYNTHROID) tablet 50 mcg, Daily  pregabalin (LYRICA) capsule 150 mg, BID  sodium chloride flush 0.9 % injection 5-40 mL, 2 times per day  sodium chloride flush 0.9 % injection 5-40 mL, PRN  0.9 % sodium chloride infusion, PRN  ondansetron (ZOFRAN-ODT) disintegrating tablet 4 mg, Q8H PRN  polyethylene glycol (GLYCOLAX) packet 17 g, Daily PRN  acetaminophen (TYLENOL) tablet 650 mg, Q6H PRN   Or  acetaminophen (TYLENOL) suppository 650 mg, Q6H PRN  nicotine (NICODERM CQ) 21 MG/24HR 1 patch, Daily  0.9 % sodium chloride infusion, Continuous  LORazepam (ATIVAN) tablet 1 mg, Nightly  metronidazole (FLAGYL) 500 mg in NaCl 100 mL IVPB premix, Q8H        Objective:  BP (!) 160/82   Pulse 86   Temp 98.3 °F (36.8 °C) (Oral)   Resp 18   Ht 5' 5\" (1.651 m)   Wt 175 lb 12.8 oz (79.7 kg) Comment: One pillow, one sheet  LMP 1991 (Approximate) Comment: menarche 7years old  SpO2 92%   BMI 29.25 kg/m²     Intake/Output Summary (Last 24 hours) at 2022 1159  Last data filed at 2022 0530  Gross per 24 hour   Intake --   Output 1500 ml   Net -1500 ml      Wt Readings from Last 3 Encounters:   22 175 lb 12.8 oz (79.7 kg)   21 160 lb (72.6 kg)   21 148 lb (67.1 kg)       General appearance:  Appears comfortable  Eyes: Sclera icterus jaundice appearance. Pupils equal.  ENT: Moist oral mucosa. Trachea midline, no adenopathy. Cardiovascular: Regular rhythm, normal S1, S2. No murmur. No edema in lower extremities  Respiratory: Not using accessory muscles. Good inspiratory effort. Clear to auscultation bilaterally, no wheeze or crackles. GI: Abdomen soft, no tenderness, not distended, normal bowel sounds  Musculoskeletal: No cyanosis in digits, neck supple  Neurology: CN 2-12 grossly intact. No speech or motor deficits  Psych: Normal affect. Alert and oriented in time, place and person  Skin: Warm, dry, normal turgor    Labs and Tests:  CBC:   Recent Labs     22  0858 22  0522 22  0554   WBC 14.5* 16.5* 12.9*   HGB 11.1* 9.7* 8.5*    197 185     BMP:    Recent Labs     22  0858 22  0522 22  0554    136 141   K 6.2* 4.7 4.4    106 111*   CO2 11* 15* 18*   BUN 35* 31* 22*   CREATININE 0.8 0.6 <0.5*   GLUCOSE 192* 152* 96     Hepatic:   Recent Labs     22  0844 22  0522 22  0554   * 249* 135*   * 359* 248*   BILITOT 10.3* 4.4* 3.2*   ALKPHOS 1,657* 1,123* 903*       Discussed care with patient             Problem List  Active Problems:    Obstructive jaundice    Jaundice, non-  Resolved Problems:    * No resolved hospital problems. *       Assessment & Plan:   1. Pancreatic lesion mass  -With obstructive jaundice.  -Total bilirubin LFT LFTs are trending  -No plan for surgery  -See GI note for the  Overall lab still continues to trend slowly  Diet per GI and general surgery  -No plans for surgical invention.  -  Goals of care was discussed. Awaiting pathology. -CODE STATUS adjusted to DNR CCA  -Patient to meet with hospice . Discussed with daughter. She wants to take home with hospice    Diet: ADULT DIET;  Full Liquid  ADULT ORAL NUTRITION SUPPLEMENT; Breakfast, Lunch, Dinner; Standard High Calorie/High Protein Oral Supplement  Code:DNR-CCA  DVT PPX lovenox       Serene Doyle MD   1/27/2022 11:59 AM

## 2022-01-27 NOTE — PROGRESS NOTES
Shift assessment completed. VSS. Patient reports having lower back pain and pain all over, reporting pain as 10/10. PRN medication given. Patient repositioned. Medications given per MAR. The care plan and education have been reviewed and mutually agreed upon with the patient. Call light in reach. Will continue to monitor. 1618 Patient transported to inpatient hospice facility at this time.

## 2022-01-27 NOTE — PLAN OF CARE
Problem: Skin Integrity:  Goal: Will show no infection signs and symptoms  Description: Will show no infection signs and symptoms  1/27/2022 1152 by Poornima Armijo RN  Outcome: Ongoing  1/27/2022 0734 by Barbara Parks RN  Outcome: Ongoing  1/27/2022 0733 by Barbara Parks RN  Outcome: Ongoing  Goal: Absence of new skin breakdown  Description: Absence of new skin breakdown  1/27/2022 1152 by Poornima Armijo RN  Outcome: Ongoing  1/27/2022 0734 by Barbara Parks RN  Outcome: Ongoing  1/27/2022 0733 by Barbara Parks RN  Outcome: Ongoing     Problem: Falls - Risk of:  Goal: Will remain free from falls  Description: Will remain free from falls  1/27/2022 1152 by Poornima Armijo RN  Outcome: Ongoing  1/27/2022 0734 by Barbara Parks RN  Outcome: Ongoing  1/27/2022 0733 by Barbara Parks RN  Outcome: Ongoing  Goal: Absence of physical injury  Description: Absence of physical injury  1/27/2022 1152 by Poornima Armijo RN  Outcome: Ongoing  1/27/2022 0734 by Barbara Parks RN  Outcome: Ongoing  1/27/2022 0733 by Barbara Parks RN  Outcome: Ongoing     Problem: Nutrition  Goal: Optimal nutrition therapy  1/27/2022 1152 by Poornima Armijo RN  Outcome: Ongoing  1/27/2022 0734 by Barbara Parks RN  Outcome: Ongoing  1/27/2022 0733 by Barbara Parks RN  Outcome: Ongoing     Problem: Pain:  Goal: Pain level will decrease  Description: Pain level will decrease  1/27/2022 1152 by Poornima Armijo RN  Outcome: Ongoing  1/27/2022 0734 by Barbara Parks RN  Outcome: Ongoing  1/27/2022 0733 by Barbara Parks RN  Outcome: Ongoing  Goal: Control of acute pain  Description: Control of acute pain  1/27/2022 1152 by Poornima Armijo RN  Outcome: Ongoing  1/27/2022 0734 by Barbara Parks RN  Outcome: Ongoing  1/27/2022 0733 by Barbara Parks RN  Outcome: Ongoing  Goal: Control of chronic pain  Description: Control of chronic pain  1/27/2022 1152 by Bob Raines RN  Outcome: Ongoing  1/27/2022 0734 by Inga Garcia RN  Outcome: Ongoing  1/27/2022 0733 by Inga Garcia RN  Outcome: Ongoing     Problem: Breathing Pattern - Ineffective:  Goal: Ability to achieve and maintain a regular respiratory rate will improve  Description: Ability to achieve and maintain a regular respiratory rate will improve  1/27/2022 1152 by Bob Raines RN  Outcome: Ongoing  1/27/2022 0734 by Inga Garcia RN  Outcome: Ongoing

## 2022-01-27 NOTE — DISCHARGE INSTR - COC
Continuity of Care Form    Patient Name: Shyanne Ortiz   :  1945  MRN:  [id number]    Admit date:  2022  Discharge date:  22    Code Status Order: DNR-CCA   Advance Directives:      Admitting Physician:  Vinod Armenta MD  PCP: Bimal Estrella MD    Discharging Nurse: [facility] Unit/Room#: 7ND-2547/0873-95  Discharging Unit Phone Number: [phone]    Emergency Contact:   Extended Emergency Contact Information  Primary Emergency Contact: Radhames Grady  Address: [address] Phone: [phone]  Mobile Phone: [phone]  Relation: Child    Past Surgical History:  Past Surgical History:   Procedure Laterality Date    BREAST LUMPECTOMY Right 15    BREAST SURGERY      tumors removed    BRONCHOSCOPY  2018    BAL    ERCP N/A 2022    ERCP STENT INSERTION performed by Kadeem Vazquez MD at [address] [place] [place] [address] Bilateral     right and left KNEE    KNEE ARTHROSCOPY      KNEE SURGERY  9/1/10    REMOVAL OF RETAINED ANTIBIOTIC SPACERS,LEFT KNEE DEBRIDEMENT ANSD SYNOVECTOMY WITH FROZEN SECTION. LEFT KNEE PLACEMENT OF ANTIBIOTIC SPACER LEFT KNEE    OTHER SURGICAL HISTORY  3-    subclavian stent left    OTHER SURGICAL HISTORY      stents bilateral femoral arteries    OTHER SURGICAL HISTORY Left 2/16/15    excision of left vulva lesion    MD THORSC DX LUNGS/PERICAR/MED/PLEURAL SPACE W/O BX Left 2018    LEFT VIDEO ASSISTED THORACOSCOPY, WEDGE RESECTION, MEDIASTINAL LYMPH NODE DISSECTION performed by Theron Foley MD at [address] N/A 2022    EGD W/EUS FNA performed by Kadeem Vazquez MD at [facility] each groin    VULVECTOMY  -    History of radical vulvectomy with a left inguinal lymph node dissection 2008.        Immunization History:   Immunization History Administered Date(s) Administered    Influenza A (Y0T6-25) Vaccine IM 2010    Influenza Vaccine, unspecified formulation 10/01/2016    Influenza Virus Vaccine 10/10/2014    Influenza Whole 2010    Influenza, High Dose (Fluzone 65 yrs and older) 10/31/2017, 10/24/2018    Influenza, Triv, inactivated, subunit, adjuvanted, IM (Fluad 65 yrs and older) 10/15/2019    Pneumococcal Conjugate 7-valent (Prevnar7) 2008, 2013    Pneumococcal Polysaccharide ([id number]) 2018       Active Problems:  Patient Active Problem List   Diagnosis Code    Breast mass, right N63.10    Breast cancer ([facility] C50.919    Osteoporosis M81.0    Cancer of vulva ([facility] C51.9    Diabetes ([facility] E11.9    Dysphagia, oropharyngeal R13.12    Peripheral blood vessel disorder (HCC) I73.9    Procidentia of rectum K62.3    Gonalgia M25.569    Cervical spinal stenosis M48.02    HTN (hypertension), benign I10    Pulmonary emphysema (HCC) J43.9    History of cancer of vulva Z85.44    Smoking F17.200    History of right breast cancer Z85.3    Malignant neoplasm of lower-outer quadrant of right female breast ([facility] C50.511    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 A    Pap smear of cervix shows high risk HPV present R87.810    Chronic respiratory failure with hypoxia (HCC) J96.11    Abnormal CT of the chest R93.89    Respiratory failure (HCC) J96.90    Acute respiratory failure (HCC) J96.00    Moderate COPD (chronic obstructive pulmonary disease) (HCC) J44.9    Chest pain R07.9    Squamous cell carcinoma of left lung (HCC) C34.92    Primary cancer of left upper lobe of lung (HCC) C34.12    Generalized weakness R53.1    Numbness and tingling R20.0, R20.2    Cervical disc disease with myelopathy M50.00    Dyslipidemia E78.5    Obstructive jaundice K83.1    Jaundice, non- R17       Isolation/Infection:   Isolation            No Isolation Patient Infection Status       Infection Onset Added Last Indicated Last Indicated By Review Planned Expiration Resolved Resolved By    None active    Resolved    COVID-19 (Rule Out) 02/01/21 02/01/21 02/01/21 COVID-19 (Ordered)   02/02/21 Rule-Out Test Resulted            Nurse Assessment:  Last Vital Signs: BP (!) 160/82   Pulse 86   Temp 98.3 °F (36.8 °C) (Oral)   Resp 18   Ht 5' 5\" (1.651 m)   Wt 175 lb 12.8 oz (79.7 kg) Comment: One pillow, one sheet  LMP 03/11/1991 (Approximate) Comment: menarche 7years old  SpO2 92%   BMI 29.25 kg/m²     Last documented pain score (0-10 scale): Pain Level: 10  Last Weight:   Wt Readings from Last 1 Encounters:   01/26/22 175 lb 12.8 oz (79.7 kg)     Mental Status:  oriented and alert    IV Access:  - Peripheral IV - site  L Antecubital, insertion date: ***    Nursing Mobility/ADLs:  Walking   Dependent  Transfer  Dependent  Bathing  Dependent  Dressing  Dependent  Toileting  Dependent  Feeding  Independent  Med Admin  Assisted  Med Delivery   whole    Wound Care Documentation and Therapy:        Elimination:  Continence: Bowel: No  Bladder: No  Urinary Catheter: None   Colostomy/Ileostomy/Ileal Conduit: No       Date of Last BM: ***    Intake/Output Summary (Last 24 hours) at 1/27/2022 1510  Last data filed at 1/27/2022 0530  Gross per 24 hour   Intake --   Output 1500 ml   Net -1500 ml     I/O last 3 completed shifts:  In: -   Out: 1500 [Urine:1500]    Safety Concerns: At Risk for Falls    Impairments/Disabilities:      None    Nutrition Therapy:  Current Nutrition Therapy:   - Oral Diet:  Full Liquid    Routes of Feeding: Oral  Liquids: No Restrictions  Daily Fluid Restriction: no  Last Modified Barium Swallow with Video (Video Swallowing Test): not done    Treatments at the Time of Hospital Discharge:   Respiratory Treatments: ***  Oxygen Therapy:  is on oxygen at 4 L/min per nasal cannula.   Ventilator:    - No ventilator support    Rehab Therapies: {THERAPEUTIC INTERVENTION:3253279528}  Weight Bearing Status/Restrictions: No weight bearing restirctions  Other Medical Equipment (for information only, NOT a DME order):  {EQUIPMENT:265588548}  Other Treatments: ***    Patient's personal belongings (please select all that are sent with patient):  None    RN SIGNATURE:  Electronically signed by Etelvina Carrington RN on 1/27/22 at 3:13 PM EST    CASE MANAGEMENT/SOCIAL WORK SECTION    Inpatient Status Date: ***    Readmission Risk Assessment Score:  Readmission Risk              Risk of Unplanned Readmission:  19           Discharging to Facility/ Agency   Name:   Address:  Phone:  Fax:    Dialysis Facility (if applicable)   Name:  Address:  Dialysis Schedule:  Phone:  Fax:    / signature: {Esignature:570625005}    PHYSICIAN SECTION    Prognosis: {Prognosis:1276761682}    Condition at Discharge: 50Sneha Quan Patient Condition:067204204}    Rehab Potential (if transferring to Rehab): {Prognosis:6150468601}    Recommended Labs or Other Treatments After Discharge: ***    Physician Certification: I certify the above information and transfer of Stephen Duenas  is necessary for the continuing treatment of the diagnosis listed and that she requires {Admit to Appropriate Level of Care:32590} for {GREATER/LESS:406306313} 30 days.      Update Admission H&P: {CHP DME Changes in FTICM:413320391}    PHYSICIAN SIGNATURE:  {Esignature:793113014}

## 2022-01-27 NOTE — PROGRESS NOTES
CREATININE 0.8 0.6 <0.5*   GLUCOSE 192* 152* 96     Hepatic:    Recent Labs     01/25/22  0844 01/26/22  0522 01/27/22  0554   * 249* 135*   * 359* 248*   BILITOT 10.3* 4.4* 3.2*   ALKPHOS 1,657* 1,123* 903*     Amylase:  No results found for: AMYLASE  Lipase:    Lab Results   Component Value Date    LIPASE 18.0 01/24/2022    LIPASE 62.0 01/23/2022      Mag:    Lab Results   Component Value Date    MG 1.80 11/03/2018     Phos:     Lab Results   Component Value Date    PHOS 4.3 01/24/2022    PHOS 4.9 01/24/2022      Coags:   Lab Results   Component Value Date    PROTIME 15.6 01/24/2022    PROTIME 13.5 06/07/2011    INR 1.36 01/24/2022    APTT 33.1 01/24/2022       Cultures:  Anaerobic culture  No results found for: LABANAE  Fungus stain  No results found for requested labs within last 30 days. Gram stain  No results found for requested labs within last 30 days. Organism  No results found for: [facility]  Surgical culture  No results found for: CXSURG  Blood culture 1  Results in Past 30 Days  Result Component Current Result Ref Range Previous Result Ref Range   Blood Culture, Routine No Growth to date. Any change in status will be called. (1/23/2022)  Not in Time Range      Blood culture 2  Results in Past 30 Days  Result Component Current Result Ref Range Previous Result Ref Range   Culture, Blood 2 No Growth to date. Any change in status will be called. (1/23/2022)  Not in Time Range      Fecal occult  No results found for requested labs within last 30 days. GI bacterial pathogens by PCR  No results found for requested labs within last 30 days. C. difficile  No results found for requested labs within last 30 days. Urine culture  Lab Results   Component Value Date    LABURIN  01/23/2022     <50,000 CFU/ml mixed skin/urogenital angle.  No further workup       Pathology:  EUS 1/24/2022--FINAL DIAGNOSIS:     Pancreatic Head Mass, EUS Fine Needle Aspiration:   -  Poorly differentiated carcinoma; favor adenocarcinoma- See Comment. COMMENT:  The tumor cells are positive for CK7. Synaptophysin   (neuroendocrine marker) is negative. P40 (squamous marker) shows rare   staining. Together with morphology, a poorly differentiated   adenocarcinoma is favored. Imaging:  I have personally reviewed the following films:    No results found. Scheduled Meds:   pantoprazole  40 mg Oral QAM AC    ipratropium-albuterol  1 ampule Inhalation TID    cefepime  2,000 mg IntraVENous Q12H    amLODIPine  5 mg Oral Daily    DULoxetine  30 mg Oral Daily    budesonide-formoterol  2 puff Inhalation BID    levothyroxine  50 mcg Oral Daily    pregabalin  150 mg Oral BID    sodium chloride flush  5-40 mL IntraVENous 2 times per day    nicotine  1 patch TransDERmal Daily    LORazepam  1 mg Oral Nightly    metroNIDAZOLE  500 mg IntraVENous Q8H     Continuous Infusions:   dextrose      sodium chloride 25 mL (22 0917)    sodium chloride 200 mL/hr at 22 2241     PRN Meds:. HYDROmorphone, glucose, dextrose, glucagon (rDNA), dextrose, albuterol sulfate HFA, ondansetron, bisacodyl, levalbuterol, sodium chloride flush, sodium chloride, ondansetron **OR** [DISCONTINUED] ondansetron, polyethylene glycol, acetaminophen **OR** acetaminophen      Assessment:  77 y.o. female admitted with   1. Jaundice, non-    2. Transaminitis    3. Hyperbilirubinemia    4. Pancreatic mass    5. Hospice care        Pancreatic mass eroding into duodenum, with obstructive jaundice and pain, possible metastasis to liver with lesions noted on CT imaging  Status-post ERCP/EUS with decompressive biliary stent placement and biopsy on 2022  Urinary tract infection  Hypertension  COPD  History of DVT, on Xarelto--currently held      Plan:  1.  No plans for surgical intervention at this time, further workup needed; CEA 23.1, CA 19-9 and AFP pending, biopsy resulted after rounds--shows poorly differentiated carcinoma, favor adenocarcinoma; continue supportive care  2. Full liquid diet as tolerated, PPI; monitor bowel function  3. IV hydration; monitor and correct electrolytes  4. Antibiotics--on cefepime and Flagyl  5. Activity as tolerated--PT/OT following  6. PRN analgesics and antiemetics--minimizing narcotics as tolerated  7. Management of medical comorbid etiologies per primary team and consulting services; Palliative care following, code status changed to [facility] yesterday, Hospice consult pending    EDUCATION:  Educated patient on plan of care and disease process--all questions answered. Plans discussed with patient and nursing. Reviewed and discussed with Dr. Cristo Davies. Signed:  Mariia Fabian, APRN - CNP  1/27/2022 11:42 AM     Surgery Staff:     Pt seen and examined with NP  See full note above  Pt with clinical sx's about the same    Path:  FINAL DIAGNOSIS:     Pancreatic Head Mass, EUS Fine Needle Aspiration:   -  Poorly differentiated carcinoma; favor adenocarcinoma- See Comment. Review of notes and D/W family indicate Hospice care level. Comments of known metastatic disease is reflected in review of the chart notes. This is not proven however, there is no chest CT and the liver and adrenal area lesions may or may not represent cancer. Similarly, resectability of the primary tumor is not clear. No thoughts or management change may occur dur to the cancer diagnosisand overall debility, but that is the accurate scenario. We will see prn, thank you.     John Horton MD

## 2022-01-27 NOTE — PROGRESS NOTES
Speech Language Pathology  Dysphagia Treatment Note    Name:  Joey Sun  :   1945  Medical Diagnosis:  Hyperbilirubinemia [E80.6]  Obstructive jaundice [K83.1]  Transaminitis [R74.01]  Pancreatic mass [K86.89]  Jaundice, non- [R17]  Treatment Diagnosis: Oropharyngeal Dysphagia  Pain level: Reported 8/10 stomach pain- RN notified     Diet level prior to treatment: Clear Liquid diet per MD with Thin Liquids, Meds as tolerated  Tolerance of Current Diet Level: Per chart review and RN report, no noted difficulty with current diet. Assessment of Texture Tolerance:  -Impressions:   Patient was in bed upon arrival of this therapist [facility] was elevated. Patient was awake however, eyes remained closed throughout the session. Patient remains on 4L O2 via nasal cannula. Patient continues on a Clear Liquid diet as ordered by MD. Patterson Apgar patient on swallowing strategies and precautions including recommendation to be seated upright for all intake. Oral cavity was very dry with lips peeling. Patient took sips of liquids  Via straw. She held material in mouth prior to initiation of swallow. Suspected premature spillage to pharynx. Adequate oral clearance with clear liquids. Dry cough noted x1 post swallow. Nursing indicated that intake has been diminished, however no significant difficulty with diet. Recommend continue with current diet with use of general swallowing precautions.         Diet and Treatment Recommendations 2022:  Recommend continuation of Clear Liquid diet per MD with Thin Liquids, Meds as tolerated    Compensatory strategies: Upright as possible with all PO intake , Small bites/sips , Eat/feed slowly, Remain upright 30-45 min     Dysphagia Goals:   1.) Pt will functionally tolerate recommended diet with no overt clinical s/s of aspiration (ongoing 2022)  2.) Pt will functionally tolerate ongoing assessment of swallow function with diet to be determined as indicated (ongoing 1/27/2022)     Plan:  Continued daily Dysphagia treatment with goals per plan of care. Patient/Family Education:Education given to the Pt and nurse, who verbalized understanding    Discharge Recommendations:  Pt will benefit from continued skilled Speech Therapy for Dysphagia services, prior to returning home. Timed Code Treatment: 0 minutes    Total Treatment Time: 15 minutes    If patient discharges prior to next session this note will serve as a discharge summary.      Signature:   Melissa Preston M.S., CCC-SLP #[id number] 1/27/2022 10:39 AM  Speech-Language Pathologist

## 2022-01-31 NOTE — ED PROVIDER NOTES
Primary Care Physician: Bruno Glass MD   Attending Physician: Luly Siddiqui MD     History   Chief Complaint   Patient presents with    Code     Unresponsive at home, given 4 rounds epi in route, CPR in progress at triage. KATEY Santiago  is a 77 y.o. female with extensive cardiac history including aortic aneurysm, Buerger's disease, COPD, hypertension, breast cancer status post radiation who presents brought by EMS undergoing CPR. Per reports daughter called 911 because patient was found unresponsive. Unknown amount of time how long she was down. CPR was initiated by EMS crew. Was given 3 doses of epinephrine with return to spontaneous circulation. However 5 minutes before they arrived emergency room patient went back into asystole. Of note patient was a hospice patient and per daughter did not want CPR performed on her.   Nonetheless patient was evaluated in the emergency room upon arrival.    Past Medical History:   Diagnosis Date    Aortic aneurysm [facility])     monitored by Dr. Gopi Malhotra disease [facility])     Cancer of vulva ([facility] 2008    microscopic invasive squamous carcinoma vulva    Cataract     COPD (chronic obstructive pulmonary disease) ([facility]     Emphysema (subcutaneous) (surgical) resulting from a procedure     History of radiation therapy      Completed external beam radiation therapy 04/23/14 total 5000 cGy to the right breast.     Hypertension     Lung bullae ([facility]     monitored by Dr. Jorge Stevens vascular dis         Past Surgical History:   Procedure Laterality Date    BREAST LUMPECTOMY Right 2/6/14    BREAST SURGERY      tumors removed    BRONCHOSCOPY  01/25/2018    BAL    ERCP N/A 1/24/2022    ERCP STENT INSERTION performed by Roseann Diego MD at [address] [place] [address] Bilateral     right and left KNEE    KNEE ARTHROSCOPY      KNEE SURGERY  9/1/10    REMOVAL OF RETAINED ANTIBIOTIC SPACERS,LEFT KNEE DEBRIDEMENT ANSD SYNOVECTOMY WITH FROZEN SECTION. LEFT KNEE PLACEMENT OF ANTIBIOTIC SPACER LEFT KNEE    OTHER SURGICAL HISTORY  3-2010    subclavian stent left    OTHER SURGICAL HISTORY      stents bilateral femoral arteries    OTHER SURGICAL HISTORY Left 2/16/15    excision of left vulva lesion    OK THORSC DX LUNGS/PERICAR/MED/PLEURAL SPACE W/O BX Left 11/2/2018    LEFT VIDEO ASSISTED THORACOSCOPY, WEDGE RESECTION, MEDIASTINAL LYMPH NODE DISSECTION performed by Mindy Silva MD at [address] N/A 1/24/2022    EGD W/EUS FNA performed by Roseann Diego MD at [address]      stent each groin    VULVECTOMY      History of radical vulvectomy with a left inguinal lymph node dissection November 2008. Family History   Problem Relation Age of Onset    Cancer Mother         cervical    Stroke Sister     Heart Disease Brother     Breast Cancer Maternal Aunt         x2        Social History     Socioeconomic History    Marital status:       Spouse name: Not on file    Number of children: Not on file    Years of education: Not on file    Highest education level: Not on file   Occupational History    Not on file   Tobacco Use    Smoking status: Current Every Day Smoker     Packs/day: 2.00     Years: 57.00     Pack years: 114.00     Types: Cigarettes    Smokeless tobacco: Never Used    Tobacco comment: started to smoke at 16 / smoked up to 2 ppd / now smoking 0.50 ppd cigarettes   Vaping Use    Vaping Use: Never used   Substance and Sexual Activity    Alcohol use: No    Drug use: Yes     Comment: marijuana    Sexual activity: Not on file   Other Topics Concern    Not on file   Social History Narrative    Not on file     Social Determinants of Health     Financial Resource Strain:     Difficulty of Paying Living Expenses: Not on file   Food Insecurity:     Worried About [address] in the Last Year: Not on file    Ran Out of Food in the Last Year: Not on file   Transportation Needs:     Lack of Transportation (Medical): Not on file    Lack of Transportation (Non-Medical): Not on file   Physical Activity:     Days of Exercise per Week: Not on file    Minutes of Exercise per Session: Not on file   Stress:     Feeling of Stress : Not on file   Social Connections:     Frequency of Communication with Friends and Family: Not on file    Frequency of Social Gatherings with Friends and Family: Not on file    Attends [employer] Services: Not on file    Active Member of [address] [place] [address] [employer] or Organizations: Not on file    Attends Club or Organization Meetings: Not on file    Marital Status: Not on file   Intimate Partner Violence:     Fear of Current or Ex-Partner: Not on file    Emotionally Abused: Not on file    Physically Abused: Not on file    Sexually Abused: Not on file   Housing Stability:     Unable to Pay for Housing in the Last Year: Not on file    Number of [place] in the Last Year: Not on file    Unstable Housing in the Last Year: Not on file        Review of Systems   10 total systems reviewed and found to be negative unless otherwise noted in HPI     Physical Exam   LMP 03/11/1991 (Approximate) Comment: menarche 7years old     CONSTITUTIONAL: Unresponsive  HEAD: atraumatic, normocephalic   EYES: Pupils were fixed and dilated  ENT: Emesis in the mouth. NECK: Normal ROM, NO LAD   CARDIOVASCULAR: No cardiac motion on ultrasound and asystole on cardiac monitoring  PULMONARY/CHEST: Abdomen was distended  ABDOMEN: Soft, Non-distended   SKIN: Acyanotic, warm, dry   MUSCULOSKELETAL: No swelling, tenderness or deformity   NEUROLOGICAL: Unresponsive  Nursing note and vitals reviewed.      ED Course & Medical Decision Making   Medications   EPINEPHrine PF 1 MG/ML injection (1 mg IntraVENous Given 1/30/22 9158)      Labs Reviewed - No data to display   No orders to display      CT HEAD WO CONTRAST    Result Date: 1/23/2022  EXAMINATION: CT OF THE HEAD WITHOUT CONTRAST  1/23/2022 7:40 pm TECHNIQUE: CT of the head was performed without the administration of intravenous contrast. Dose modulation, iterative reconstruction, and/or weight based adjustment of the mA/kV was utilized to reduce the radiation dose to as low as reasonably achievable. COMPARISON: None. HISTORY: ORDERING SYSTEM PROVIDED HISTORY: fall TECHNOLOGIST PROVIDED HISTORY: Reason for exam:->fall Has a \"code stroke\" or \"stroke alert\" been called? ->No Decision Support Exception - unselect if not a suspected or confirmed emergency medical condition->Emergency Medical Condition (MA) Reason for Exam: Abnormal Lab (Patient in by squad from home with elevated liver enzymes and abd pain. ) FINDINGS: BRAIN/VENTRICLES: The ventricles are borderline enlarged and there is diffuse mild prominence of the cortical sulci. There is mild periventricular low density bilaterally. No intracranial hemorrhage or edema is seen. There is no extra-axial fluid collection or mass. ORBITS: The visualized portion of the orbits demonstrate no acute abnormality. SINUSES: There is moderate mucosal thickening throughout the ethmoid and maxillary sinuses. The mastoid air cells demonstrate no acute abnormality. SOFT TISSUES/SKULL:  No acute abnormality of the visualized skull or soft tissues. Mild atrophy and mild chronic microischemic disease scattered in the deep white matter which is unchanged with no acute abnormality seen. Chronic sinusitis which is more prominent. CT CERVICAL SPINE WO CONTRAST    Result Date: 1/23/2022  EXAMINATION: CT OF THE CERVICAL SPINE WITHOUT CONTRAST 1/23/2022 7:40 pm TECHNIQUE: CT of the cervical spine was performed without the administration of intravenous contrast. Multiplanar reformatted images are provided for review.  Dose modulation, iterative reconstruction, and/or weight based adjustment of the mA/kV was utilized to reduce the radiation dose to as low as reasonably achievable. COMPARISON: 04/05/2021 HISTORY: ORDERING SYSTEM PROVIDED HISTORY: fall TECHNOLOGIST PROVIDED HISTORY: Reason for exam:->fall Decision Support Exception - unselect if not a suspected or confirmed emergency medical condition->Emergency Medical Condition (MA) Reason for Exam: Abnormal Lab (Patient in by squad from home with elevated liver enzymes and abd pain. ) FINDINGS: BONES/ALIGNMENT: There is moderately severe disc space narrowing throughout the lower cervical spine with prominent osteophytes throughout which is unchanged. There is minimal subluxation of C4 on C5 which is unchanged. There is mild subluxation of C7 on T1 which is unchanged. There are pedicle screws extending through the C7 and T1 vertebra which are held in place with surgical rods and is unchanged. No fracture is seen. The atlantoaxial joint is intact. There are subchondral cystic or erosive changes along the base of the odontoid process posteriorly which is unchanged. DEGENERATIVE CHANGES: There is mild lucency around the pedicle screw of C7 which is unchanged. There are moderate disc osteophyte complexes at C5-6 and C6-7 causing moderate dural sac effacement. There are laminectomy defects of C7 and T1 which is unchanged. There is moderate facet hypertrophy and sclerosis throughout. SOFT TISSUES: There is no prevertebral soft tissue swelling. There are some hazy reticulonodular opacities along both upper lobes. Moderate degenerative disc changes throughout the lower cervical spine with which are unchanged with no acute abnormality seen. Status post laminectomy and interbody fusion and C7-T1 which is unchanged. There is some lucency along the pedicle screws of C7 which is could indicate loosening of the screws which is unchanged. Recommend surgical follow-up Moderate disc osteophyte complexes at C5-6 and C6-7 which is unchanged.      CT ABDOMEN PELVIS W IV CONTRAST Additional Contrast? None    Result Date: 1/23/2022  EXAMINATION: CT OF THE ABDOMEN AND PELVIS WITH CONTRAST 1/23/2022 7:41 pm TECHNIQUE: CT of the abdomen and pelvis was performed with the administration of intravenous contrast. Multiplanar reformatted images are provided for review. Dose modulation, iterative reconstruction, and/or weight based adjustment of the mA/kV was utilized to reduce the radiation dose to as low as reasonably achievable. COMPARISON: CT chest angiogram 09/07/2021. CT chest, abdomen, and pelvis 04/05/2021. HISTORY: ORDERING SYSTEM PROVIDED HISTORY: juancice r/o choledocolithiasis TECHNOLOGIST PROVIDED HISTORY: Reason for exam:->juancice r/o choledocolithiasis Additional Contrast?->None Decision Support Exception - unselect if not a suspected or confirmed emergency medical condition->Emergency Medical Condition (MA) Reason for Exam: Abnormal Lab (Patient in by squad from home with elevated liver enzymes and abd pain. ) FINDINGS: Lower Chest: Chronic ground-glass opacities in the lung bases not significantly changed from 09/07/2021. COPD. Severe atherosclerosis of the distal descending thoracic aorta with a chronic 5.5 cm fusiform aneurysm as measured on coronal image 74 not significantly changed. Organs: Severe diffuse intrahepatic biliary ductal dilatation. The common duct is distended measuring up to approximately 2.5 cm on coronal image 65. There is abrupt narrowing in the head of the pancreas where there appears to be a low-density mass measuring approximately 4.1 x 3.2 cm on axial image 91 new from 04/05/2021.  8 mm ovoid nonspecific low-density lesion in the left hepatic lobe on axial image 46 new from 04/05/2021. Similar appearing 11 mm low-density lesion in the right hepatic lobe on axial image 73 also new. 8 mm low-density lesion in the right hepatic lobe on axial image 52. Sludge versus small gallstones. Mild gallbladder wall thickening and pericholecystic stranding. No pancreatic ductal dilatation.   The body and tail are normal in appearance. The spleen and left adrenal gland are unremarkable. 14 mm indeterminate right adrenal nodule new or mildly increased in size from 04/05/2021. Chronic cortical scarring in the midpole of the right kidney. Small bilateral nonobstructing renal calculi. No obstructive uropathy. GI/Bowel: Colonic diverticulosis without evidence of acute diverticulitis. Normal appendix. No obstruction or wall thickening identified. Pelvis: The urinary bladder is normal in appearance. The uterus and bilateral adnexae are unremarkable. No free fluid. No pelvic or inguinal lymphadenopathy. Peritoneum/Retroperitoneum: The abdominal aorta is normal in caliber with moderate to severe atherosclerosis. Bilateral iliac stents are in place. No retroperitoneal or mesenteric lymphadenopathy is identified. No free air or fluid is seen in the abdomen. The splenic and portal veins are patent. The hepatic veins are grossly patent. The inferior vena cava and bilateral renal veins are patent. Bones/Soft Tissues: Ovoid lucent lesion in the right L4 vertebral body unchanged from 04/05/2021. No acute osseous or soft tissue abnormality. 1. 4.1 cm low-density mass in the pancreatic head suspicious for a primary pancreatic neoplasm. Severe intra and extrahepatic biliary ductal dilatation secondary to the mass. Mild gallbladder wall thickening and pericholecystic stranding possibly reactive secondary to the common duct obstruction with acute cholecystitis not excluded. 2. Several small low-density lesions in the liver new from 04/05/2021 with metastases not excluded. Consider further evaluation with a liver protocol MRI. 3. 14 mm indeterminate right adrenal nodule. This could also be further evaluated with MRI. 4. Small ovoid lucent lesion in the right L4 vertebral body unchanged from 04/05/2021. Given the long-term stability this is likely benign. Consider further evaluation with MRI.      FL ERCP BILIARY S&I    Result Date: 1/24/2022  EXAMINATION: SPOT FLUOROSCOPIC IMAGES 1/24/2022 5:27 pm TECHNIQUE: Fluoroscopy was provided by the radiology department for procedure. Radiologist was not present during examination. FLUOROSCOPY DOSE AND TYPE OR TIME AND EXPOSURES: 6 minute 12 seconds, 11 images COMPARISON: 01/23/2022 CT HISTORY: Intraprocedural imaging. Mass in the pancreatic head observed on prior CT. FINDINGS: 11 spot images of the abdomen were obtained. Fluoroscopic assistance provided for ERCP. Imaging demonstrates severe biliary dilatation. A biliary stent is placed. There is evidence of extrinsic mass effect upon the stent. Intraprocedural fluoroscopic spot images as above. See separate procedure report for more information. XR CHEST PORTABLE    Result Date: 1/23/2022  EXAMINATION: ONE XRAY VIEW OF THE CHEST 1/23/2022 5:46 pm COMPARISON: None. HISTORY: ORDERING SYSTEM PROVIDED HISTORY: abd pian TECHNOLOGIST PROVIDED HISTORY: Reason for exam:->simón lao Reason for Exam: abd pain FINDINGS: The heart is normal.  The pulmonary vessels are engorged centrally. There are some hazy interstitial opacities throughout both lungs which is more prominent on the left. No effusion is seen. There is a questionable small hiatal hernia along the lower mediastinum. The aorta is ectatic. Hazy interstitial opacities throughout both lungs which is more prominent on the left and is increased. This could represent early multisegmental interstitial pneumonitis. Recommend follow-up Questionable small hiatal hernia. ERCP    Result Date: 1/24/2022  No dictation     EUS    Result Date: 1/24/2022  No dictation   PROCEDURES:   Procedures    ASSESSMENT AND PLAN:  [id number], Christian Santiago is a 77 y.o. female with extensive cardiac history including aortic aneurysm, Buerger's disease, COPD, hypertension, breast cancer status post radiation who presents brought by EMS undergoing CPR.   Per reports daughter called 911 because patient was found unresponsive. Unknown amount of time how long she was down. CPR was initiated by EMS crew. Was given 3 doses of epinephrine with return to spontaneous circulation. However 5 minutes before they arrived emergency room patient went back into asystole. Of note patient was a hospice patient and per daughter did not want CPR performed on her. Nonetheless patient was evaluated in the emergency room upon arrival.  On exam patient was unresponsive pupils fixed and dilated on bedside ultrasound with no signs of life. She was asystole on cardiac monitor. We did continue CPR with few minutes. After evaluation it was clear that patient had no signs of life. She was actually down for unknown amount of time and CPR being performed for more than 30 minutes. I did not think that indication to continue CPR. She was pronounced dead shortly after she presented 18:41. Daughter indicated that moderate never wanted CPR and she was marked that CPR had been performed. I did offer condolences, and they were were able to see the body. 's office was consulted and patient was not a candidate for autopsy. ClINICAL IMPRESSION:  1.  Cardiac arrest [facility])        DISPOSITION  2022 07:05:56 PM    ___________________________________________________________________________________  Amount and/or Complexity of Data Reviewed:  Clinical lab tests: ordered and reviewed   Tests in the radiology section of CPT®: ordered and reviewed   Tests in the medicine section of CPT®: ordered and reviewed   Decide to obtain previous medical records or to obtain history from someone other than the patient  Obtain history from someone other than the patient yes  Review and summarize past medical records:yes  I looked up the patient in our electronic medical record:yes  Discuss the patient with other providers:yes  Independent visualization of images, tracings, or specimens:yes  Risk of Complications, Morbidity, and/or Mortality:Moderate  Presenting problems: moderate Diagnostic procedures: moderate yes Management options: moderate     Critical Care Attestation   Total critical care time: 35 minutes minimum   Critical care time does not include separately billable procedures and treating other patients. Critical care was necessary to treat or prevent imminent or life-threatening deterioration of the following conditions: Cardiac arrest CPR performed with no return to spontaneous circulation. Case discussed with consultants.       _________________________________________________________________________________________  This record is transcribed utilizing voice recognition technology. There are inherent limitations in this technology. In addition, there may be limitations in editing of this report. If there are any discrepancies, please contact me directly.         Helane Schilder, MD  01/30/22 9746

## 2022-01-31 NOTE — ED NOTES
Per , not a case. Post-mortem care performed and family is now at bedside.       Loi Peng RN  01/30/22 2004

## 2022-01-31 NOTE — DISCHARGE SUMMARY
[facility] DISCHARGE SUMMARY    Patient Demographics    Patient. Stephen Duenas  Date of Birth. 1945  MRN. [id number]     Primary care provider. Ricardo Baldwin MD  (Tel: [phone])    Admit date: 1/23/2022    Discharge date (blank if same as Note Date): 1/27/2022  Note Date: 1/31/2022     Reason for Hospitalization. Chief Complaint   Patient presents with    Abnormal Lab     Patient in by squad from home with elevated liver enzymes and abd pain. [facility] Course. Pancreatic lesion with elevated (obstructive jaundice  -With pancreatic adenocarcinoma. Patient was eval by hospice care patient was canceled since then to DNR CC not a surgical candidate patient was discharged to inpatient hospice    Consults. IP CONSULT TO GI  IP CONSULT TO DIETITIAN  IP CONSULT TO ONCOLOGY  IP CONSULT TO PALLIATIVE CARE  IP CONSULT TO HOSPICE    Physical examination on discharge day. BP (!) 160/82   Pulse 86   Temp 98.3 °F (36.8 °C) (Oral)   Resp 18   Ht 5' 5\" (1.651 m)   Wt 175 lb 12.8 oz (79.7 kg) Comment: One pillow, one sheet  LMP 03/11/1991 (Approximate) Comment: menarche 7years old  SpO2 92%   BMI 29.25 kg/m²   General appearance. Alert. Looks comfortable. HEENT. Sclera clear. Moist mucus membranes. Cardiovascular. Regular rate and rhythm, normal S1, S2. No murmur. Respiratory. Not using accessory muscles. Clear to auscultation bilaterally, no wheeze. Gastrointestinal. Abdomen soft, non-tender, not distended, normal bowel sounds  Neurology. Facial symmetry. No speech deficits. Moving all extremities equally. Extremities. No edema in lower extremities. Skin. Warm, dry, normal turgor    Condition at time of discharge stable     Medication instructions provided to patient at discharge.      Medication List      START taking these medications    ciprofloxacin 500 MG tablet  Commonly known as: CIPRO  Take 1 tablet by mouth 2 times daily for 10 days        CHANGE how you take these medications    * LORazepam 1 MG tablet  Commonly known as: ATIVAN  What changed: Another medication with the same name was added. Make sure you understand how and when to take each. * LORazepam 2 MG/ML concentrated solution  Commonly known as: ATIVAN  Take 0.5 mLs by mouth every 8 hours as needed (anxiety) for up to 14 days. What changed: You were already taking a medication with the same name, and this prescription was added. Make sure you understand how and when to take each. * LORazepam 2 MG/ML concentrated solution  Commonly known as: ATIVAN  Take 0.5 mLs by mouth every 8 hours as needed (anxiety) for up to 14 days. What changed: You were already taking a medication with the same name, and this prescription was added. Make sure you understand how and when to take each. * This list has 3 medication(s) that are the same as other medications prescribed for you. Read the directions carefully, and ask your doctor or other care provider to review them with you. CONTINUE taking these medications    albuterol sulfate  (90 Base) MCG/ACT inhaler  Inhale 2 puffs into the lungs every 6 hours as needed for Wheezing     amLODIPine 5 MG tablet  Commonly known as: NORVASC     bisacodyl 5 MG EC tablet  Commonly known as: DULCOLAX     DULoxetine 30 MG extended release capsule  Commonly known as: CYMBALTA     exemestane 25 MG tablet  Commonly known as: AROMASIN  TAKE 1 TABLET BY MOUTH ONCE A DAY FOR BREAST CANCER     fluticasone 50 MCG/ACT nasal spray  Commonly known as: FLONASE     fluticasone-salmeterol 230-21 MCG/ACT inhaler  Commonly known as: Advair HFA  INHALE 2 PUFFS BY MOUTH TWICE DAILY     * Incruse Ellipta 62.5 MCG/INH Aepb  Generic drug: Umeclidinium Bromide  INHALE 1 PUFF BY MOUTH DAILY AS DIRECTED     * Umeclidinium Bromide 62.5 MCG/INH Aepb  Commonly known as:  Incruse Ellipta  Inhale 1 puff into the lungs daily     levalbuterol 0.63 MG/3ML nebulization  Commonly known as: XOPENEX  TAKE 3 MLS BY MOUTH VIA NEBULIZER EVERY 4 HOURS AS NEEDED FOR WHEEZING     levothyroxine 50 MCG tablet  Commonly known as: SYNTHROID     lisinopril 10 MG tablet  Commonly known as: PRINIVIL;ZESTRIL     Lyrica 150 MG capsule  Generic drug: pregabalin     Mucinex 600 MG extended release tablet  Generic drug: guaiFENesin     OXYGEN     potassium chloride 20 MEQ extended release tablet  Commonly known as: KLOR-CON M     rosuvastatin 10 MG tablet  Commonly known as: CRESTOR         * This list has 2 medication(s) that are the same as other medications prescribed for you. Read the directions carefully, and ask your doctor or other care provider to review them with you. STOP taking these medications    HYDROcodone-acetaminophen  MG per tablet  Commonly known as: NORCO     ibuprofen 400 MG tablet  Commonly known as: ADVIL;MOTRIN     Xarelto 20 MG Tabs tablet  Generic drug: rivaroxaban        ASK your doctor about these medications    morphine sulfate 20 MG/ML concentrated oral solution  Take 0.5 mLs by mouth every 2 hours as needed for Pain for up to 3 days. Ask about: Should I take this medication? Where to Get Your Medications      These medications were sent to [facility] [address], [place] [address]    Phone: [phone]   · ciprofloxacin 500 MG tablet     You can get these medications from any pharmacy    Bring a paper prescription for each of these medications  · LORazepam 2 MG/ML concentrated solution  · LORazepam 2 MG/ML concentrated solution  · morphine sulfate 20 MG/ML concentrated oral solution         Discharge recommendations given to patient. Follow Up. pcp in 1 week   Disposition inpatient hospice  Activity.  activity as tolerated  Diet: No diet orders on file Spent 45  minutes in discharge process.     Signed:  Henry Mcdonald MD     1/31/2022 10:32 AM

## 2022-02-13 NOTE — TELEPHONE ENCOUNTER
Ok to fill Xopenex for neb solution per Dr Jenny Shepard - pt has an appt on 9/28/18
Pt c/o the albuterol neb solution causing her to have the shakes / palpitations - can the pt switch to Xopenex neb solution Dr Adin Victor please advise - pt aware this will be addressed on Wednesday
ok
3

## (undated) DEVICE — SUTURE PERMAHAND SZ 3-0 L30IN NONABSORBABLE BLK SILK BRAID A304H

## (undated) DEVICE — DRAPE,LAP,CHOLE,W/TROUGHS,STERILE: Brand: MEDLINE

## (undated) DEVICE — TISSUE RETRIEVAL SYSTEM: Brand: INZII RETRIEVAL SYSTEM

## (undated) DEVICE — CANNULATING SPHINCTEROTOME: Brand: TRUETOME JAG 44

## (undated) DEVICE — BW-412T DISP COMBO CLEANING BRUSH: Brand: SINGLE USE COMBINATION CLEANING BRUSH

## (undated) DEVICE — RELOAD STPL H4.1X2MM DIA60MM THCK TISS GRN 6 ROW PWR GST B

## (undated) DEVICE — SKIN AFFIX SURG ADHESIVE 72/CS 0.55ML: Brand: MEDLINE

## (undated) DEVICE — SYRINGE, LUER LOCK, 10ML: Brand: MEDLINE

## (undated) DEVICE — PROCEDURE KIT ENDOSCP CUST

## (undated) DEVICE — LAPAROSCOPIC SCISSORS: Brand: EPIX LAPAROSCOPIC SCISSORS

## (undated) DEVICE — TROCAR ENDOSCP L100MM DIA5MM BLDELSS STBL SL OBT RADLUC

## (undated) DEVICE — TRUETOME 44 30MM PRELOAD JAG 035IN 450

## (undated) DEVICE — ENDOSCOPIC ULTRASOUND FINE NEEDLE BIOPSY (FNB) DEVICE: Brand: ACQUIRE

## (undated) DEVICE — SUTURE VCRL SZ 3-0 L27IN ABSRB UD L26MM SH 1/2 CIR J416H

## (undated) DEVICE — ENDOPATH ECHELON VASCULAR  RELOADS, WHITE, 35MM: Brand: ECHELON ENDOPATH

## (undated) DEVICE — 3M™ STERI-DRAPE™ INSTRUMENT POUCH 1018: Brand: STERI-DRAPE™

## (undated) DEVICE — SUTURE PERMAHAND SZ 0 L30IN NONABSORBABLE BLK SILK UNIDIR SA86G

## (undated) DEVICE — SOLUTION IV IRRIG WATER 500ML POUR BRL ST 2F7113

## (undated) DEVICE — Z INACTIVE USE 2535480 CLIP LIG M BLU TI HRT SHP WIRE HORZ 180 PER BX

## (undated) DEVICE — DRAPE: MAGNETIC 12X16 30/CS: Brand: MEDICAL ACTION INDUSTRIES

## (undated) DEVICE — GOWN AURORA NONREINF LG: Brand: MEDLINE INDUSTRIES, INC.

## (undated) DEVICE — TROCAR: Brand: KII FIOS FIRST ENTRY

## (undated) DEVICE — BAG U-BAG PLASTIC 10OZ MICROPORE ADHES

## (undated) DEVICE — 3M™ WARMING BLANKET, LOWER BODY, 10 PER CASE, 42568: Brand: BAIR HUGGER™

## (undated) DEVICE — Z CONVERTED USE 2271043 CONTAINER SPEC COLL 4OZ SCR ON LID PEEL PCH

## (undated) DEVICE — GLOVE ORTHO 8   MSG9480

## (undated) DEVICE — STAPLER SKIN L320MM 35MM VASC TISS 12 FIRING B FRM PWR

## (undated) DEVICE — CORD ES L10FT MPLR LAP

## (undated) DEVICE — ELECTRODE PT RET AD L9FT HI MOIST COND ADH HYDRGEL CORDED

## (undated) DEVICE — SOLUTION IV IRRIG POUR BRL 0.9% SODIUM CHL 2F7124

## (undated) DEVICE — EXTENSION SET, 2 INJECTION SITES, MALE LUER LOCK ADAPTER WITH RETRACTABLE COLLAR: Brand: INTERLINK

## (undated) DEVICE — SOLUTION: ACTIFOG W/FOAM PAD 48/CS: Brand: ACTIFOG™

## (undated) DEVICE — DRAIN SURG SGL COLL PT TB FOR ATS BG OASIS

## (undated) DEVICE — GLOVE SURG SZ 7.5 L11.73IN FNGR THK9.8MIL STRW LTX POLYMER

## (undated) DEVICE — GOWN SIRUS NONREIN XL W/TWL: Brand: MEDLINE INDUSTRIES, INC.

## (undated) DEVICE — SUTURE PERMAHAND SZ 2-0 L30IN NONABSORBABLE BLK SILK W/O A305H

## (undated) DEVICE — SUTURE COAT VCRL SZ 4-0 L18IN ABSRB UD L19MM PS-2 1/2 CIR J496G

## (undated) DEVICE — KIT OR ROOM TURNOVER W/STRAP

## (undated) DEVICE — BLOCK BITE 48FR DENT RIM CAREGUARD

## (undated) DEVICE — CATHETER IV 18GA L1.25N GREEN FEP SFTY STRGHT HUB RDPQUE DSP

## (undated) DEVICE — STAPLER INT L60MM DIA12MM STD TISS TI LNAR CUT LN 6 ROW

## (undated) DEVICE — SUTURE PERMAHAND SZ 2-0 L30IN NONABSORBABLE BLK SH L26MM C016D

## (undated) DEVICE — SYRINGE MED 50ML LUERLOCK TIP

## (undated) DEVICE — SHEET,DRAPE,40X58,STERILE: Brand: MEDLINE

## (undated) DEVICE — SET LBLING PEN POLYPR LBL PAL

## (undated) DEVICE — TOWEL,OR,DSP,ST,BLUE,DLX,10/PK,8PK/CS: Brand: MEDLINE

## (undated) DEVICE — 3M™ IOBAN™ 2 ANTIMICROBIAL INCISE DRAPE 6651EZ: Brand: IOBAN™ 2

## (undated) DEVICE — YANKAUER,BULB TIP,W/O VENT,RIGID,STERILE: Brand: MEDLINE

## (undated) DEVICE — DISCONTINUED USE 394516 DRESSING MEPILEX SACRUM 7.2X7.2

## (undated) DEVICE — Device: Brand: BALLOON3

## (undated) DEVICE — DRESSING WND SM W2.5XL4IN BRTH W/ CATH SECUREMENT AND

## (undated) DEVICE — SET CATH 20GA L1.75IN RAD ART POLYUR RADPQ W/ INTEGR

## (undated) DEVICE — SET VLV 3 PC AWS DISPOSABLE GRDIAN SCOPEVALET

## (undated) DEVICE — PRESSURE MONITORING SET: Brand: TRUWAVE

## (undated) DEVICE — MAJOR SET UP PK

## (undated) DEVICE — CATHETER PH SUCT 14FR

## (undated) DEVICE — Z DISCONTINUED USE 2516375 APPLICATOR MEDICATED 3 CC CLR STRL CHLORAPREP

## (undated) DEVICE — Device: Brand: SINGLE USE SOFT BRUSH

## (undated) DEVICE — GLOVE SURG SZ 75 L12IN FNGR THK94MIL STD WHT LTX FREE

## (undated) DEVICE — POSITIONER HD W8XH4XL8.5IN RASPBERRY FOAM SLT

## (undated) DEVICE — MOUTHPIECE ENDOSCP L CTRL OPN AND SIDE PORTS DISP

## (undated) DEVICE — TTL1LYR 16FR10ML 100%SIL TMPST TR: Brand: MEDLINE

## (undated) DEVICE — SUTURE VCRL SZ 2-0 L36IN ABSRB UD L36MM CT-1 1/2 CIR J945H

## (undated) DEVICE — NEEDLE HYPO 18GA L1.5IN THN WALL PIVOTING SHLD BVL ORIENTED

## (undated) DEVICE — TRAP SPEC 80ML MUCUS

## (undated) DEVICE — LOTION PREP REMV 5OZ IODO CLR TINC OF BENZ DURAPREP

## (undated) DEVICE — AMBU ASCOPE 3 SLIM - VIDEO BRONCHIOSCOPE SINGLE-USE, FLEXIBLE AND STERILE. INSERTION CORD DIA 3.8 MM, CHANNEL WIDTH OF 1.2 MM. BENDING ANGLE OF 130°/130° MIN. ORDERING 5 PCS. = 1 BOX.: Brand: ASCOPE™ 3 SLIM  3.8/1.2FLEXIBLE VIDEOSCOPE - SINGLE USE

## (undated) DEVICE — SUPPORT WRST AD W3.5XL9IN DIA14.5IN ART SFT ADJ HK AND LOOP

## (undated) DEVICE — DRAPE THER FLUID WARMING 66X44 IN FLAT SLUSH DBL DISC ORS

## (undated) DEVICE — CATHETER THORACENTESIS STR 28 FRX23 IN 6 EYELET TAPR TIP LF

## (undated) DEVICE — SUTURE VCRL SZ 0 L27IN ABSRB UD L36MM CT-1 1/2 CIR J260H

## (undated) DEVICE — APPLICATOR PREP 26ML 0.7% IOD POVACRYLEX 74% ISO ALC ST

## (undated) DEVICE — Z CONVERTED USE 2271182 CUP DENT W4XL3IN D2IN GRN LEAK RESIST DBL SEAL CLSR ETCHED